# Patient Record
Sex: MALE | Employment: UNEMPLOYED | URBAN - METROPOLITAN AREA
[De-identification: names, ages, dates, MRNs, and addresses within clinical notes are randomized per-mention and may not be internally consistent; named-entity substitution may affect disease eponyms.]

---

## 2020-09-07 ENCOUNTER — TRANSFERRED RECORDS (OUTPATIENT)
Dept: HEALTH INFORMATION MANAGEMENT | Facility: CLINIC | Age: 25
End: 2020-09-07

## 2020-10-26 ENCOUNTER — TRANSFERRED RECORDS (OUTPATIENT)
Dept: HEALTH INFORMATION MANAGEMENT | Facility: CLINIC | Age: 25
End: 2020-10-26

## 2020-12-03 ENCOUNTER — TRANSFERRED RECORDS (OUTPATIENT)
Dept: HEALTH INFORMATION MANAGEMENT | Facility: CLINIC | Age: 25
End: 2020-12-03

## 2020-12-07 ENCOUNTER — TRANSFERRED RECORDS (OUTPATIENT)
Dept: HEALTH INFORMATION MANAGEMENT | Facility: CLINIC | Age: 25
End: 2020-12-07

## 2020-12-08 ENCOUNTER — TRANSFERRED RECORDS (OUTPATIENT)
Dept: HEALTH INFORMATION MANAGEMENT | Facility: CLINIC | Age: 25
End: 2020-12-08

## 2020-12-08 ENCOUNTER — TELEPHONE (OUTPATIENT)
Dept: ONCOLOGY | Facility: CLINIC | Age: 25
End: 2020-12-08

## 2020-12-08 ENCOUNTER — MEDICAL CORRESPONDENCE (OUTPATIENT)
Dept: HEALTH INFORMATION MANAGEMENT | Facility: CLINIC | Age: 25
End: 2020-12-08

## 2020-12-08 NOTE — TELEPHONE ENCOUNTER
[facility] Call Center    Phone Message    May a detailed message be left on voicemail: yes     Reason for Call: Other: Dr. Vj Corey called to request that Dr. Major call him back to discuss a complex surgery for the patient. Please call Dr. Corey to discuss.      Action Taken: Other:  Urology    Travel Screening: Not Applicable

## 2020-12-09 ENCOUNTER — TRANSCRIBE ORDERS (OUTPATIENT)
Dept: OTHER | Age: 25
End: 2020-12-09

## 2020-12-09 DIAGNOSIS — C77.2 MALIGNANT NEOPLASM OF TESTIS METASTATIC TO INTRA-ABDOMINAL LYMPH NODE (H): Primary | ICD-10-CM

## 2020-12-09 DIAGNOSIS — C79.00: ICD-10-CM

## 2020-12-09 DIAGNOSIS — C62.90 MALIGNANT NEOPLASM OF TESTIS METASTATIC TO INTRA-ABDOMINAL LYMPH NODE (H): Primary | ICD-10-CM

## 2020-12-11 ENCOUNTER — TELEPHONE (OUTPATIENT)
Dept: OTHER | Age: 25
End: 2020-12-11

## 2020-12-11 ENCOUNTER — DOCUMENTATION ONLY (OUTPATIENT)
Dept: UROLOGY | Facility: CLINIC | Age: 25
End: 2020-12-11

## 2020-12-11 NOTE — PROGRESS NOTES
MEDICAL RECORDS REQUEST   [facility]  Urology Clinic  [address] [place] [address]  PHONE: [phone]  Fax: [phone]        FUTURE VISIT INFORMATION                                                   Cyrus Huertas : 1995 scheduled for future visit at [facility]    APPOINTMENT INFORMATION:    Date:     Provider:  Frank Major MD    Reason for Visit/Diagnosis:Llarge retroperitoneal mass     REFERRAL INFORMATION:    Referring provider: N/A    Specialty: N/A    Referring providers clinic:  [facility]    Clinic contact number:  N/A    RECORDS REQUESTED FOR VISIT                                                     NOTES  STATUS/DETAILS   OFFICE NOTE from referring provider  yes   OFFICE NOTE from other specialist  no   DISCHARGE SUMMARY from hospital  yes   DISCHARGE REPORT from the ER  yes   OPERATIVE REPORT  yes   MEDICATION LIST  yes     PRE-VISIT CHECKLIST      Record collection complete Yes- [facility] recs scanned in epic  Images in  PACS   PET 20  CT 10/26/20    Appointment appropriately scheduled           (right time/right provider) Yes   MyChart activation If no, please explain: In process   Questionnaire complete If no, please explain: In process      Completed by: Halie Waldron

## 2020-12-11 NOTE — TELEPHONE ENCOUNTER
Oncology/Surgical Oncology Referral Request:     Specialty Requested: Medical Oncology     Referring Provider: Vj Corey MD     Referring Clinic/Organization: [facility] location: Faxed - Media tab/Scanned     Requested Provider (if specified): Frank Jeff MD      Called pt x 2. LVM and sending letter

## 2020-12-11 NOTE — TELEPHONE ENCOUNTER
Called pt x 2 to schedule appt from the referral in system. Per Kianna Lindsay:  He can be scheduled at 4:20 on 12/17/20.  Thank you  Pt did not answered. Sending letter.

## 2020-12-11 NOTE — LETTER
"    December 11, 2020       TO: Cyrus Pop Kiya  [address]  [facility] [address]         Dear Mr. Huertas,    Our records indicate that you have not scheduled an appointment for a consultation, as recommended by Dr. Vj Corey. If you wish to schedule within [facility]alth, we have several options to help you schedule your appointment:      Call [phone]    Visit our website at [email or web address] and click \"I Want To\" (in upper right) and select Request an Appointment    Request an appointment via [employer] at [email or web address]     If you have chosen to schedule elsewhere or if you have already made an appointment, please disregard this letter.    If you have any questions or concerns regarding the information above, please contact the [employer] DEPARTMENT at [phone].      Sincerely,      [facility] DEPARTMENT                  "

## 2020-12-15 ENCOUNTER — TELEPHONE (OUTPATIENT)
Dept: ONCOLOGY | Facility: CLINIC | Age: 25
End: 2020-12-15

## 2020-12-15 NOTE — TELEPHONE ENCOUNTER
Left message for pt to call and schedule a virtual visit consult with Dr. Major on 12/17/20 at 4:15PM to discuss Testis mass, okay to overbook per Kianna.

## 2020-12-15 NOTE — TELEPHONE ENCOUNTER
----- Message from Kianna Lindsay RN sent at 12/15/2020 11:55 AM CST -----  Can you please call this patient and schedule him to have a VV with Dr. Major on Thursday, 12/17, at the end of day to discuss Testis mass? Thank you  ----- Message -----  From: Frank Major MD  Sent: 12/14/2020   5:04 PM CST  To: Kianna Lindsay RN    This is him. I'd like to see him Thursday. Fine to add at the end of the day.    [place]    ----- Message -----  From: Kianna Lindsay RN  Sent: 12/14/2020   2:41 PM CST  To: Frank Major MD    I cannot find that Testis mass anywhere. Was it an email? Mcpike?

## 2020-12-17 ENCOUNTER — TELEPHONE (OUTPATIENT)
Dept: UROLOGY | Facility: CLINIC | Age: 25
End: 2020-12-17

## 2020-12-17 ENCOUNTER — VIRTUAL VISIT (OUTPATIENT)
Dept: UROLOGY | Facility: CLINIC | Age: 25
End: 2020-12-17
Payer: COMMERCIAL

## 2020-12-17 DIAGNOSIS — C62.90 MALIGNANT NEOPLASM OF TESTIS METASTATIC TO INTRA-ABDOMINAL LYMPH NODE (H): Primary | ICD-10-CM

## 2020-12-17 DIAGNOSIS — C77.2 MALIGNANT NEOPLASM OF TESTIS METASTATIC TO INTRA-ABDOMINAL LYMPH NODE (H): Primary | ICD-10-CM

## 2020-12-17 PROBLEM — C79.02: Status: ACTIVE | Noted: 2020-12-17

## 2020-12-17 PROBLEM — C80.1: Status: ACTIVE | Noted: 2020-12-17

## 2020-12-17 PROBLEM — I15.9 SECONDARY HYPERTENSION: Status: ACTIVE | Noted: 2020-12-17

## 2020-12-17 PROCEDURE — 99205 OFFICE O/P NEW HI 60 MIN: CPT | Mod: 95 | Performed by: UROLOGY

## 2020-12-17 ASSESSMENT — PAIN SCALES - GENERAL: PAINLEVEL: NO PAIN (0)

## 2020-12-17 NOTE — LETTER
"12/17/2020      RE: Cyrus Huertas  [address]  [facility] [address]       Video Visit Technology for this patient: [employer] Video Visit- Please send an invite to patient's Text to cell phone: [phone]   Cyrus Huertas is a 25 year old male who is being evaluated via a billable video visit.       The patient has been notified of following:      \"This video visit will be conducted via a call between you and your physician/provider. We have found that certain health care needs can be provided without the need for an in-person physical exam.  This service lets us provide the care you need with a video conversation.  If a prescription is necessary we can send it directly to your pharmacy.  If lab work is needed we can place an order for that and you can then stop by our lab to have the test done at a later time.     Video visits are billed at different rates depending on your insurance coverage.  Please reach out to your insurance provider with any questions.     If during the course of the call the physician/provider feels a video visit is not appropriate, you will not be charged for this service.\"     Patient has given verbal consent for Video visit? Yes  How would you like to obtain your AVS? Mail a copy  If you are dropped from the video visit, the video invite should be resent to: Text to cell phone: [phone]    Will anyone else be joining your video visit? No          Chief Complaint:   Retroperitoneal mass; Testis Cancer         Consult or Referral:     Mr. Cyrus Huertas is a 25 year old male evaluated at the request of Dr. Corey.         History of Present Illness:   Cyrus Huertas is a very pleasant 25 year old male who presents with a history of testis cancer. He initially presented in Sept 2020 with large left testis mass. Of note, also had COVID 19 at that time. He subsequently was found to have large left testis mass with large retroperitoneal nodes involving the left kidney, " consistent with metastasis. Tumor markers were elevated, most notably AFP which was 123,000 at diagnosis.    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 He subsequently underwent chemotherapy at [facility] with total of 5 cycles of Etoposide, Cisplatin, and Ifosfamide. Overall he tolerated chemotherapy very well. His tumor markers have declined, but remain elevated. On post-treatment PET scan, he was noted to have had interval improvement in side of retroperitoneal mass, but this remains rather large and clearly invades the left kidney. He is referred for discussion of RPLND.    AFP  12/3/2020 - 1,031  11/18/2020 - 2,471  10/28/2020 - 13,003  10/22/2020 - 21,635  10/7/2020 - 79,636  9/17/2020 - 123,828    Left Orchiectomy 9/7/2020  Final Diagnosis:  A.  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 Tumor invades the scrotum. Margins free of tumor. Pathologic   staging (AJCC 8th ed.): pT4. See Comment for complete tumor synopsis.    B.  Testis, left testicle medial margin with scrotal involvement, orchiectomy - Yolk sac tumor   involving the subcutaneous scrotal soft tissue. Margins free of tumor.         Past Medical History:   Testis cancer; metastatic  Secondary Hypertension         Past Surgical History:   Left orchiectomy         Medications     Current Outpatient Medications   Medication     NO ACTIVE MEDICATIONS     PERMETHRIN 1 % EX LOTN     No current facility-administered medications for this visit.           Family History:   No known family history of  malignancy.         Social History:     Social History     Socioeconomic History     Marital status: Single     Spouse name: Not on file     Number of children: Not on file     Years of education: Not on file     Highest education level: Not on file   Occupational History     Not on file   Social Needs     Financial resource strain:  Not on file     Food insecurity     Worry: Not on file     Inability: Not on file     Transportation needs     Medical: Not on file     Non-medical: Not on file   Tobacco Use     Smoking status: Never Smoker   Substance and Sexual Activity     Alcohol use: Not on file     Drug use: Not on file     Sexual activity: Not on file   Lifestyle     Physical activity     Days per week: Not on file     Minutes per session: Not on file     Stress: Not on file   Relationships     Social connections     Talks on phone: Not on file     Gets together: Not on file     Attends Worship service: Not on file     Active member of club or organization: Not on file     Attends meetings of clubs or organizations: Not on file     Relationship status: Not on file     Intimate partner violence     Fear of current or ex partner: Not on file     Emotionally abused: Not on file     Physically abused: Not on file     Forced sexual activity: Not on file   Other Topics Concern     Not on file   Social History Narrative     Not on file           Allergies:   Penicillins, Lactose, and Uncaria tomentosa (cats claw)         Review of Systems:  From intake questionnaire     Skin: negative  Eyes: negative  Ears/Nose/Throat: negative  Respiratory: No shortness of breath, dyspnea on exertion, cough, or hemoptysis  Cardiovascular: No chest pain or palpitations  Gastrointestinal: negative; no nausea/vomiting, constipation or diarrhea  Genitourinary: as per HPI  Musculoskeletal: negative  Neurologic: negative  Psychiatric: negative  Hematologic/Lymphatic/Immunologic: negative  Endocrine: negative         Physical Exam:     Patient is a 25 year old  male   Vitals: There were no vitals taken for this visit.  Constitutional: There is no height or weight on file to calculate BMI.  Alert, no acute distress, oriented, conversant  Eyes: no scleral icterus; extraocular muscles intact  Respiratory: no respiratory distress, or pursed lip breathing  Musculoskeletal:  normal range of motion  Skin: no notable lesions visible  Neuro: Alert, oriented, speech and mentation normal  Psych: affect and mood normal, alert and oriented to person, place and time      Labs and Pathology:    I reviewed all applicable laboratory and pathology data and went over findings with patient  Pathology from testis mass reviewed in detail.       Imaging:    I directly visualized and reviewed all applicable imaging a with patient.    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 Significant decrease in size of the dominant left retroperitoneal soft tissue metastatic lesion, with mild residual peripheral nodular uptake likely representing a degree of residual viable tumor. Lobular extension of the mass medially anterior to the mesenteric root with similar imaging characteristics. Previously seen peritoneal nodularity is significantly improved.  2. Postsurgical changes of left orchiectomy.  3. Intermediate FDG uptake within a right inguinal lymph node is nonspecific, could be reactive, metastasis difficult to entirely exclude but considered less likely.  4. Improvement in mass effect on adjacent structures, including decreased mass effect on the left kidney, which continues to demonstrate moderate hydronephrosis and renal cortical thinning compatible with impaired function.  5. Diffuse bladder wall thickening probably indicates a degree of chronic outlet obstruction.  6. Massive rectal stool burden.      Outside and Past Medical records:    I spent 10 minutes reviewing outside and past medical records.         Assessment and Plan:     Assessment: 25 year old male with stage [id number], stage IIIB testis cancer. Tolerated 5 cycles of VIP with response of his tumor on PET scan. He has persistent elevation of is AFP (although down-trending), and a large residual retroperitoneal mass about 13 cm in size (down from 22cm  on initial imaging). There remains some uptake on PET within the mass. The mass also involves the left kidney.    We had a long discussion about these findings and plans going forward. Given the residual mass after chemotherapy, we discussed the role for RPLND and resection of this mass. Given the appearance on imaging, left nephrectomy will also be required. We discussed the potential risks of this procedure, including risk of bleeding, infection, injury to surrounding structures, chylous ascites, retrograde ejaculation, and wound complications. More specifically, we discussed the potential for considerable scar tissue around the mass, which may complicate the dissection of surrounding organs including pancreas, colon, and spleen. In addition, there is a risk of vascular involvement and scar tissue in the eduard-aortic tissues. As such, will plan to have vascular surgery on stand-by for procedure, given there is a risk that aortic repair or grafting may be required to completely resect the mass. This was all discussed in great detail with Mr. Huertas and he elects to proceed. He expresses understanding of expected post-op recovery and risk for complications.    Plan:  - Schedule open retroperitoneal lymphadenectomy with resection of retroperitoneal mass and left radical nephrectomy  - PAC visit  - Plan for repeat tumor markers and CT chest/abd/pelvis prior to surgery    Orders  Orders Placed This Encounter   Procedures     CT Chest/Abdomen/Pelvis w Contrast     AFP tumor marker     HCG tumor marker     Lactate Dehydrogenase (LDH)     Comprehensive metabolic panel     CBC with platelets differential     PAC Visit Referral (For [facility] Only)     Video-Visit Details    Type of service:  Video Visit    Video Start Time: 3:48 PM  Video End Time: 4:05 PM    Originating Location (pt. Location): Home    Distant Location (provider location):  [facility] UROLOGY AND [facility] FOR PROSTATE AND UROLOGIC CANCERS    Platform used for Video  Visit: Torito Major MD  Urology   [facility]

## 2020-12-17 NOTE — PROGRESS NOTES
"Video Visit Technology for this patient: [employer] Video Visit- Please send an invite to patient's Text to cell phone: [phone]   Cyrus Huertas is a 25 year old male who is being evaluated via a billable video visit.       The patient has been notified of following:      \"This video visit will be conducted via a call between you and your physician/provider. We have found that certain health care needs can be provided without the need for an in-person physical exam.  This service lets us provide the care you need with a video conversation.  If a prescription is necessary we can send it directly to your pharmacy.  If lab work is needed we can place an order for that and you can then stop by our lab to have the test done at a later time.     Video visits are billed at different rates depending on your insurance coverage.  Please reach out to your insurance provider with any questions.     If during the course of the call the physician/provider feels a video visit is not appropriate, you will not be charged for this service.\"     Patient has given verbal consent for Video visit? Yes  How would you like to obtain your AVS? Mail a copy  If you are dropped from the video visit, the video invite should be resent to: Text to cell phone: [phone]    Will anyone else be joining your video visit? No          Chief Complaint:   Retroperitoneal mass; Testis Cancer         Consult or Referral:     Mr. Cyrus Huertas is a 25 year old male evaluated at the request of Dr. Corey.         History of Present Illness:   Cyrus Huertas is a very pleasant 25 year old male who presents with a history of testis cancer. He initially presented in Sept 2020 with large left testis mass. Of note, also had COVID 19 at that time. He subsequently was found to have large left testis mass with large retroperitoneal nodes involving the left kidney, consistent with metastasis. Tumor markers were elevated, most notably AFP which was " 123,000 at diagnosis.    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 He subsequently underwent chemotherapy at [facility] with total of 5 cycles of Etoposide, Cisplatin, and Ifosfamide. Overall he tolerated chemotherapy very well. His tumor markers have declined, but remain elevated. On post-treatment PET scan, he was noted to have had interval improvement in side of retroperitoneal mass, but this remains rather large and clearly invades the left kidney. He is referred for discussion of RPLND.    AFP  12/3/2020 - 1,031  11/18/2020 - 2,471  10/28/2020 - 13,003  10/22/2020 - 21,635  10/7/2020 - 79,636  9/17/2020 - 123,828    Left Orchiectomy 9/7/2020  Final Diagnosis:  A.  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 Tumor invades the scrotum. Margins free of tumor. Pathologic   staging (AJCC 8th ed.): pT4. See Comment for complete tumor synopsis.    B.  Testis, left testicle medial margin with scrotal involvement, orchiectomy - Yolk sac tumor   involving the subcutaneous scrotal soft tissue. Margins free of tumor.         Past Medical History:   Testis cancer; metastatic  Secondary Hypertension         Past Surgical History:   Left orchiectomy         Medications     Current Outpatient Medications   Medication     NO ACTIVE MEDICATIONS     PERMETHRIN 1 % EX LOTN     No current facility-administered medications for this visit.           Family History:   No known family history of  malignancy.         Social History:     Social History     Socioeconomic History     Marital status: Single     Spouse name: Not on file     Number of children: Not on file     Years of education: Not on file     Highest education level: Not on file   Occupational History     Not on file   Social Needs     Financial resource strain: Not on file     Food insecurity     Worry: Not on file     Inability: Not on file      Transportation needs     Medical: Not on file     Non-medical: Not on file   Tobacco Use     Smoking status: Never Smoker   Substance and Sexual Activity     Alcohol use: Not on file     Drug use: Not on file     Sexual activity: Not on file   Lifestyle     Physical activity     Days per week: Not on file     Minutes per session: Not on file     Stress: Not on file   Relationships     Social connections     Talks on phone: Not on file     Gets together: Not on file     Attends Protestant service: Not on file     Active member of club or organization: Not on file     Attends meetings of clubs or organizations: Not on file     Relationship status: Not on file     Intimate partner violence     Fear of current or ex partner: Not on file     Emotionally abused: Not on file     Physically abused: Not on file     Forced sexual activity: Not on file   Other Topics Concern     Not on file   Social History Narrative     Not on file           Allergies:   Penicillins, Lactose, and Uncaria tomentosa (cats claw)         Review of Systems:  From intake questionnaire     Skin: negative  Eyes: negative  Ears/Nose/Throat: negative  Respiratory: No shortness of breath, dyspnea on exertion, cough, or hemoptysis  Cardiovascular: No chest pain or palpitations  Gastrointestinal: negative; no nausea/vomiting, constipation or diarrhea  Genitourinary: as per HPI  Musculoskeletal: negative  Neurologic: negative  Psychiatric: negative  Hematologic/Lymphatic/Immunologic: negative  Endocrine: negative         Physical Exam:     Patient is a 25 year old  male   Vitals: There were no vitals taken for this visit.  Constitutional: There is no height or weight on file to calculate BMI.  Alert, no acute distress, oriented, conversant  Eyes: no scleral icterus; extraocular muscles intact  Respiratory: no respiratory distress, or pursed lip breathing  Musculoskeletal: normal range of motion  Skin: no notable lesions visible  Neuro: Alert, oriented,  speech and mentation normal  Psych: affect and mood normal, alert and oriented to person, place and time      Labs and Pathology:    I reviewed all applicable laboratory and pathology data and went over findings with patient  Pathology from testis mass reviewed in detail.       Imaging:    I directly visualized and reviewed all applicable imaging a with patient.    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 Significant decrease in size of the dominant left retroperitoneal soft tissue metastatic lesion, with mild residual peripheral nodular uptake likely representing a degree of residual viable tumor. Lobular extension of the mass medially anterior to the mesenteric root with similar imaging characteristics. Previously seen peritoneal nodularity is significantly improved.  2. Postsurgical changes of left orchiectomy.  3. Intermediate FDG uptake within a right inguinal lymph node is nonspecific, could be reactive, metastasis difficult to entirely exclude but considered less likely.  4. Improvement in mass effect on adjacent structures, including decreased mass effect on the left kidney, which continues to demonstrate moderate hydronephrosis and renal cortical thinning compatible with impaired function.  5. Diffuse bladder wall thickening probably indicates a degree of chronic outlet obstruction.  6. Massive rectal stool burden.      Outside and Past Medical records:    I spent 10 minutes reviewing outside and past medical records.         Assessment and Plan:     Assessment: 25 year old male with stage [id number], stage IIIB testis cancer. Tolerated 5 cycles of VIP with response of his tumor on PET scan. He has persistent elevation of is AFP (although down-trending), and a large residual retroperitoneal mass about 13 cm in size (down from 22cm on initial imaging). There remains some uptake on PET within the mass. The mass  also involves the left kidney.    We had a long discussion about these findings and plans going forward. Given the residual mass after chemotherapy, we discussed the role for RPLND and resection of this mass. Given the appearance on imaging, left nephrectomy will also be required. We discussed the potential risks of this procedure, including risk of bleeding, infection, injury to surrounding structures, chylous ascites, retrograde ejaculation, and wound complications. More specifically, we discussed the potential for considerable scar tissue around the mass, which may complicate the dissection of surrounding organs including pancreas, colon, and spleen. In addition, there is a risk of vascular involvement and scar tissue in the eduard-aortic tissues. As such, will plan to have vascular surgery on stand-by for procedure, given there is a risk that aortic repair or grafting may be required to completely resect the mass. This was all discussed in great detail with Mr. Huertas and he elects to proceed. He expresses understanding of expected post-op recovery and risk for complications.    Plan:  - Schedule open retroperitoneal lymphadenectomy with resection of retroperitoneal mass and left radical nephrectomy  - PAC visit  - Plan for repeat tumor markers and CT chest/abd/pelvis prior to surgery    Orders  Orders Placed This Encounter   Procedures     CT Chest/Abdomen/Pelvis w Contrast     AFP tumor marker     HCG tumor marker     Lactate Dehydrogenase (LDH)     Comprehensive metabolic panel     CBC with platelets differential     PAC Visit Referral (For [facility] Only)     Video-Visit Details    Type of service:  Video Visit    Video Start Time: 3:48 PM  Video End Time: 4:05 PM    Originating Location (pt. Location): Home    Distant Location (provider location):  [facility] UROLOGY AND [facility] FOR PROSTATE AND UROLOGIC CANCERS    Platform used for Video Visit: Torito Major MD  Urology   [facility]  Physicians

## 2020-12-21 RX ORDER — CLINDAMYCIN PHOSPHATE 900 MG/50ML
900 INJECTION, SOLUTION INTRAVENOUS
Status: CANCELLED | OUTPATIENT
Start: 2020-12-21

## 2020-12-21 RX ORDER — HEPARIN SODIUM 5000 [USP'U]/.5ML
5000 INJECTION, SOLUTION INTRAVENOUS; SUBCUTANEOUS
Status: CANCELLED | OUTPATIENT
Start: 2020-12-21

## 2020-12-21 RX ORDER — CLINDAMYCIN PHOSPHATE 900 MG/50ML
900 INJECTION, SOLUTION INTRAVENOUS SEE ADMIN INSTRUCTIONS
Status: CANCELLED | OUTPATIENT
Start: 2020-12-21

## 2020-12-22 ENCOUNTER — TELEPHONE (OUTPATIENT)
Dept: UROLOGY | Facility: CLINIC | Age: 25
End: 2020-12-22

## 2020-12-22 NOTE — TELEPHONE ENCOUNTER
Patient is scheduled for surgery with Dr. Major      Spoke or left message with: spoke with Cyrus    Date of Surgery: 1/18/2021    Location: [place]    Informed patient they will need an adult  yes    Pre-op with surgeon (if applicable): n/a    H&P: Scheduled with PAC on 1/5/2021    Additional imaging/appointments: CT on 1/5/2021    Surgery packet: Mailed on 12/22/2020     Additional comments: COVID test scheduled at [facility] on 1/14/2021 @ 10:45a

## 2020-12-23 NOTE — TELEPHONE ENCOUNTER
FUTURE VISIT INFORMATION      SURGERY INFORMATION:    Date: 1/18/21    Location: [facility] OR    Surgeon:  Frank Major MD    Anesthesia Type:  General    Procedure: LYMPHADENECTOMY, RETROPERITONEUM; RESECTION OF RETROPERITONEAL MASS LEFT RADICAL NEPHRECTOMY    Consult: virtual visit 12/17/20    RECORDS REQUESTED FROM:       Primary Care Provider: Morales Chavira MD    Pertinent Medical History: secondary hypertension    Most recent EKG+ Tracing: 10/31/20- [facility]

## 2021-01-01 DIAGNOSIS — Z11.59 ENCOUNTER FOR SCREENING FOR OTHER VIRAL DISEASES: Primary | ICD-10-CM

## 2021-01-05 ENCOUNTER — PRE VISIT (OUTPATIENT)
Dept: SURGERY | Facility: CLINIC | Age: 26
End: 2021-01-05

## 2021-01-05 NOTE — TELEPHONE ENCOUNTER
FUTURE VISIT INFORMATION        SURGERY INFORMATION:    Date: 1/18/21    Location: [facility] OR    Surgeon:  Frank Major MD    Anesthesia Type:  General    Procedure: LYMPHADENECTOMY, RETROPERITONEUM; RESECTION OF RETROPERITONEAL MASS LEFT RADICAL NEPHRECTOMY    Consult: virtual visit 12/17/20     RECORDS REQUESTED FROM:         Primary Care Provider: Morales Chavira MD     Pertinent Medical History: secondary hypertension     Most recent EKG+ Tracing: 10/31/20- [facility]

## 2021-01-06 ENCOUNTER — OFFICE VISIT (OUTPATIENT)
Dept: SURGERY | Facility: CLINIC | Age: 26
End: 2021-01-06
Payer: COMMERCIAL

## 2021-01-06 ENCOUNTER — PRE VISIT (OUTPATIENT)
Dept: SURGERY | Facility: CLINIC | Age: 26
End: 2021-01-06

## 2021-01-06 ENCOUNTER — ANESTHESIA EVENT (OUTPATIENT)
Dept: SURGERY | Facility: CLINIC | Age: 26
End: 2021-01-06
Payer: COMMERCIAL

## 2021-01-06 VITALS
DIASTOLIC BLOOD PRESSURE: 87 MMHG | BODY MASS INDEX: 23.23 KG/M2 | RESPIRATION RATE: 16 BRPM | HEART RATE: 115 BPM | SYSTOLIC BLOOD PRESSURE: 129 MMHG | WEIGHT: 148 LBS | OXYGEN SATURATION: 100 % | HEIGHT: 67 IN

## 2021-01-06 DIAGNOSIS — Z01.818 PREOP EXAMINATION: Primary | ICD-10-CM

## 2021-01-06 PROCEDURE — 99203 OFFICE O/P NEW LOW 30 MIN: CPT | Performed by: PHYSICIAN ASSISTANT

## 2021-01-06 SDOH — HEALTH STABILITY: MENTAL HEALTH: HOW MANY STANDARD DRINKS CONTAINING ALCOHOL DO YOU HAVE ON A TYPICAL DAY?: NOT ASKED

## 2021-01-06 SDOH — HEALTH STABILITY: MENTAL HEALTH: HOW OFTEN DO YOU HAVE A DRINK CONTAINING ALCOHOL?: NOT ASKED

## 2021-01-06 SDOH — HEALTH STABILITY: MENTAL HEALTH: HOW OFTEN DO YOU HAVE 6 OR MORE DRINKS ON ONE OCCASION?: NOT ASKED

## 2021-01-06 ASSESSMENT — MIFFLIN-ST. JEOR: SCORE: 1604.95

## 2021-01-06 ASSESSMENT — LIFESTYLE VARIABLES: TOBACCO_USE: 0

## 2021-01-06 ASSESSMENT — ENCOUNTER SYMPTOMS: SEIZURES: 0

## 2021-01-06 ASSESSMENT — PAIN SCALES - GENERAL: PAINLEVEL: NO PAIN (0)

## 2021-01-06 NOTE — H&P
Pre-Operative H & P     CC:  Preoperative exam to assess for increased cardiopulmonary risk while undergoing surgery and anesthesia.    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     Mr. Huertas has a history of testis cancer. He initially presented in Sept 2020 with large left testis mass. Of note, also had COVID 19 at that time. He subsequently was found to have large left testis mass with large retroperitoneal nodes involving the left kidney, consistent with metastasis. Tumor markers were elevated, most notably AFP which was 123,000 at diagnosis. 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 He subsequently underwent chemotherapy at [facility] with total of 5 cycles of Etoposide, Cisplatin, and Ifosfamide. Overall he tolerated chemotherapy very well. His tumor markers have declined, but remain elevated. On post-treatment PET scan, he was noted to have had interval improvement in side of retroperitoneal mass, but this remains rather large and clearly invades the left kidney. He now presents for the above procedure.     PMH is also significant for anemia and malnutrition during chemotherapy, improving. He has received several blood transfusions, most recently on 12/24/20.     History was obtained from patient & chart review.    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 Denies reactions.     Hx of abnormal bleeding or anti-platelet use: denies    Menstrual history: No LMP for male patient.:      Steroid use in the last year: Received dexamethasone w/ chemo    Personal or FH with difficulty with Anesthesia:  denies    Prior to Admission Medications  Current Outpatient Medications   Medication Sig Dispense Refill     NO ACTIVE MEDICATIONS .         Allergies  Allergies   Allergen Reactions     Penicillins Anaphylaxis and Rash     rash     Lactose Unknown     Uncaria Tomentosa (Cats Claw) Rash       Social History  Social History     Socioeconomic History     Marital status: Single     Spouse name: Not on file     Number of children: Not on file     Years of education: Not on file     Highest education level: Not on file   Occupational History     Not on file   Social Needs     Financial resource strain: Not on file     Food insecurity     Worry: Not on file     Inability: Not on file     Transportation needs     Medical: Not on file     Non-medical: Not on file   Tobacco Use     Smoking status: Never Smoker     Smokeless tobacco: Never Used   Substance and Sexual Activity     Alcohol use: Not Currently     Drug use: Not on file     Sexual activity: Not on file   Lifestyle     Physical activity     Days per week: Not on file     Minutes per session: Not on file     Stress: Not on file   Relationships     Social connections     Talks on phone: Not on file     Gets together: Not on file     Attends Tenriism service: Not on file     Active member of club or organization: Not on file     Attends meetings of clubs or organizations: Not on file     Relationship status: Not on file     Intimate partner violence     Fear  "of current or ex partner: Not on file     Emotionally abused: Not on file     Physically abused: Not on file     Forced sexual activity: Not on file   Other Topics Concern     Not on file   Social History Narrative     Not on file       Family History  Family History   Problem Relation Age of Onset     Anesthesia Reaction No family hx of      Cardiovascular No family hx of      Deep Vein Thrombosis (DVT) No family hx of        Preop Vitals    BP Readings from Last 3 Encounters:   01/06/21 129/87    Pulse Readings from Last 3 Encounters:   01/06/21 115   03/19/09 100   01/03/08 80      Resp Readings from Last 3 Encounters:   01/06/21 16   10/30/05 20    SpO2 Readings from Last 3 Encounters:   01/06/21 100%      Temp Readings from Last 1 Encounters:   03/19/09 97  F (36.1  C)    Ht Readings from Last 1 Encounters:   01/06/21 1.702 m (5' 7\")      Wt Readings from Last 1 Encounters:   01/06/21 67.1 kg (148 lb)    Estimated body mass index is 23.18 kg/m  as calculated from the following:    Height as of this encounter: 1.702 m (5' 7\").    Weight as of this encounter: 67.1 kg (148 lb).       ROS/MED HX  The complete review of systems is negative other than noted in the HPI or here.  Patient denies recent illness, fever and respiratory infection during past month.    ENT/Pulmonary: Comment: Tested positive for Covid 9/2020, asymptomatic     (-) tobacco use and asthma   Neurologic:  - neg neurologic ROS    (-) seizures, CVA and Neuropathy   Cardiovascular:  - neg cardiovascular ROS   (+) ----. : . . . :. . Previous cardiac testing date:results:date: results:ECG reviewed date:12/31/20 results: date: results:          METS/Exercise Tolerance:  4 - Raking leaves, gardening   Hematologic: Comments: Transfused several times, most recently on 12/24/20    (+) Anemia, History of Transfusion no previous transfusion reaction -     (-) history of blood clots   Musculoskeletal:  - neg musculoskeletal ROS       GI/Hepatic:  - neg " "GI/hepatic ROS      (-) GERD and liver disease   Renal/Genitourinary:  - ROS Renal section negative    (-) renal disease   Endo: Comment: Had steroids w/ chemo     (-) Type II DM   Psychiatric:  - neg psychiatric ROS       Infectious Disease:  - neg infectious disease ROS       Malignancy:   (+) Malignancy History of Other  Other CA Testis w/ mets to abdomen Active status post Surgery and Chemo         Other:    (+) no H/O Chronic Pain,               PHYSICAL EXAM:   Mental Status/Neuro: A/A/O; Age Appropriate   Airway: Facies: Feasible  Mallampati: I  Mouth/Opening: Full  TM distance: > 6 cm  Neck ROM: Full   Respiratory: Auscultation: CTAB     Resp. Rate: Normal     Resp. Effort: Normal      CV: Rhythm: Regular  Rate: Age appropriate  Heart: Normal Sounds  Edema: None   Comments:      Dental: Normal Dentition                  BP: 129/87 Pulse: 115   Resp: 16 SpO2: 100 %         148 lbs 0 oz  5' 7\"   Body mass index is 23.18 kg/m .       Physical Exam  Constitutional: Awake, alert, cooperative, no apparent distress, and appears stated age.  Eyes: Pupils equal, round and reactive to light, extra ocular muscles intact, sclera clear, conjunctiva normal.  HENT: Normocephalic, oral pharynx with moist mucus membranes, good dentition. No goiter appreciated. No removable dental hardware. Enlarged tonsils w/o erythema/edema/exudate.  Respiratory: Clear to auscultation bilaterally, no crackles or wheezing. No SOB when supine.  Cardiovascular: Regular rate and rhythm, normal S1 and S2, and no murmur noted.  Carotids +2, no bruits. No edema. Palpable pulses to radial, DP and PT arteries.   GI: Normal bowel sounds, soft, non-distended, non-tender, no masses palpated.    Lymph/Hematologic: No cervical lymphadenopathy and no supraclavicular lymphadenopathy.  Genitourinary:  deferred  Skin: Warm and dry.  No rashes.   Musculoskeletal: full ROM of neck. There is no redness, warmth, or swelling of the joints. Gross motor strength is " normal.    Neurologic: Awake, alert, oriented to name, place and time. Cranial nerves II-XII are grossly intact. Gait is normal. Ambulates from chair to exam table, seats self, lies supine and sits back up w/o assistance.  Neuropsychiatric: Calm, cooperative. Normal affect. Pleasant. Answers questions appropriately, follows commands w/o difficulty.        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 Significant decrease in size of the dominant left retroperitoneal soft tissue metastatic lesion, with mild residual peripheral nodular uptake likely representing a degree of residual viable tumor. Lobular extension of the mass medially anterior to the mesenteric root with similar imaging characteristics. Previously seen peritoneal nodularity is significantly improved.  2. Postsurgical changes of left orchiectomy.  3. Intermediate FDG uptake within a right inguinal lymph node is nonspecific, could be reactive, metastasis difficult to entirely exclude but considered less likely.  4. Improvement in mass effect on adjacent structures, including decreased mass effect on the left kidney, which continues to demonstrate moderate hydronephrosis and renal cortical thinning compatible with impaired function.  5. Diffuse bladder wall thickening probably indicates a degree of chronic outlet obstruction.  6. Massive rectal stool burden.      CT CHEST/ABD/PELVIS 10/26/20  Impression: In this patient with a history of metastatic testicular cancer s/p left orchiectomy and adjuvant chemotherapy there is overall mixed response:     1. Slightly decreased size of large left retroperitoneal mass as described above.  2. More pronounced exophytic component/satellite lesion that is difficult to ascertain whether this is due to decreased mass effect from larger component or  "interval growth.  3. Continued obstructive mass effect on the left kidney with encasement of the left renal vein and artery.  4. Mixed attenuating subcentimeter nodule in the left lower lobe is of indeterminate etiology. Recommend close attention on follow-up.  5. Severe constipation.    EKG 12/31/20  SINUS RHYTHM   SIGNIFICANT BASELINE ARTIFACT  BORDERLINE REPOLARIZATION CHANGES  BORDERLINE ECG      Labs today: None (T&S, CBC, CMP, lactate dehydr, RBC have available orders signed/held by Dr. 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      Pt has had prior anesthetic.     No history of anesthetic complications    He has the following specific operative considerations:   # JANIA 1/8 = low risk  # VTE risk: 1.8%  # Risk of PONV score = 2.  If > 2, anti-emetic intervention recommended.  # Anesthesia considerations:  Refer to PAC assessment in anesthesia records      CARDIAC: METS 4, Pt reports he is able to walk \"as far as I want.\" Ascends stairs w/o difficulty.       # RCRI : High risk surgery.  0.9% risk of major adverse cardiac event.      PULMONARY:     # Never smoked    # No asthma or inhaler use    # Tested + for covid 9/2020 during chemo treatment, asymptomatic    GI: denies GERD      /RENAL:     # Creatinine 1.35, GFR 83 today    # Testicular cancer w/ mets to to intra-abdominal lymph node    # Was malnourished during chemo, significantly improved    ENDO: BMI 22    # No DM    HEME:     # Anemia, has had 5 transfusions since Sept through [facility] [place], most recently 12/24/20.  Monitored " weekly. Hgb today 8.9 (up from 7.3 in December).    # RBCs ordered for DOS    ORTHO: full ROM of neck, no TMJ      Patient is optimized and is acceptable candidate for the proposed procedure. No further diagnostic evaluation is needed.    Final plan per anesthesiologist on day of surgery.     Arrival time, NPO, shower and medication instructions provided by nursing staff today.  Preparing For Your Surgery handout given.      Debbi Guajardo PA-C  Preoperative Assessment Center  [facility]  Clinic and Surgery Center  Phone: [phone]  Fax: [phone]

## 2021-01-06 NOTE — ANESTHESIA PREPROCEDURE EVALUATION
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) [C62.90, C77.2]   Procedure(s):  LYMPHADENECTOMY, RETROPERITONEUM; RESECTION OF RETROPERITONEAL MASS  LEFT RADICAL NEPHRECTOMY     LABS:  CBC: No results found for: WBC, HGB, HCT, PLT  BMP:   Lab Results   Component Value Date     2021    POTASSIUM 3.9 2021    CHLORIDE 109 2021    CO2 27 2021    BUN 17 2021    CR 1.42 (H) 2021     (H) 2021     COAGS: No results found for: PTT, INR, FIBR  POC: No results found for: BGM, HCG, HCGS  OTHER:   Lab Results   Component Value Date    GAYE 9.3 2021    ALBUMIN 3.9 2021    PROTTOTAL 7.7 2021    ALT 35 2021    AST 29 2021    ALKPHOS 105 2021    BILITOTAL 0.2 2021        Preop Vitals    BP Readings from Last 3 Encounters:   21 129/87    Pulse Readings from Last 3 Encounters:   21 115   09 100   08 80      Resp Readings from Last 3 Encounters:   21 16   10/30/05 20    SpO2 Readings from Last 3 Encounters:   21 100%      Temp Readings from Last 1 Encounters:   09 36.1  C (97  F)    Ht Readings from Last 1 Encounters:   21 1.702 m (5' 7\")      Wt Readings from Last 1 Encounters:   21 67.1 kg (148 lb)    Estimated body mass index is 23.18 kg/m  as calculated from the following:    Height as of 21: 1.702 m (5' 7\").    Weight as of 21: 67.1 kg (148 lb).     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 Pt has had prior anesthetic. Type: General.    No history of anesthetic complications          ROS/MED HX    ENT/Pulmonary: Comment: Tested positive for Covid 9/2020, asymptomatic   (-) tobacco use, asthma and COPD   Neurologic:  - neg neurologic ROS  (-) no seizures and no CVA   Cardiovascular:  - neg cardiovascular ROS   (+) -----Previous cardiac testing   Echo: Date: Results:    Stress Test: Date: Results:    ECG Reviewed: Date: 12/31/20 Results:    Cath: Date: Results:     (-) hypertension and dyslipidemia   METS/Exercise Tolerance:  4 - Raking leaves, gardening   Hematologic: Comments: Transfused several times, most recently on 12/24/20    (+) no previous transfusion reaction,    (-) history of blood clots   Musculoskeletal:  - neg musculoskeletal ROS       GI/Hepatic:  - neg GI/hepatic ROS    (-) GERD and liver disease   Renal/Genitourinary:  - neg Renal ROS    (-) renal disease   Endo: Comment: Had steroids w/ chemo     (-) Type II DM   Psychiatric:  - neg psychiatric ROS    (-) chronic opioid use history   Infectious Disease: Comment: COVID NEGATIVE 1/14/21 - neg infectious disease ROS       Malignancy: (+) Malignancy, History of Other.Other CA Testis w/ mets to abdomen Active status post Surgery and Chemo.      Other:    (+) , no H/O Chronic Pain,                       PHYSICAL EXAM:   Mental Status/Neuro: A/A/O; Age Appropriate   Airway: Facies: Feasible  Mallampati: I  Mouth/Opening: Full  TM distance: > 6 cm  Neck ROM: Full   Respiratory: Auscultation: CTAB     Resp. Rate: Normal     Resp. Effort: Normal      CV: Rhythm: Regular  Rate: Age appropriate  Heart: Normal Sounds  Edema: None   Comments:      Dental: Normal Dentition                Assessment:   ASA SCORE: 3    H&P: History and physical reviewed and following examination; no interval change.   Smoking Status:   Non-Smoker/Unknown   NPO Status: NPO Appropriate     Plan:   Anes. Type:  General   Pre-Medication: None   Induction:  IV (Standard)   Airway: ETT; Oral   Access/Monitoring: PIV; 2nd PIV; A-Line; FloTrac   Maintenance: Balanced     Advanced Monitoring: BIS     Postop Plan:   Postop Pain: Opioids; Regional  Postop Sedation/Airway: Not planned  Disposition: Inpatient/Admit     PONV Management:   Adult Risk Factors:, Non-Smoker, Postop Opioids   Prevention: Ondansetron, Dexamethasone     CONSENT: Direct conversation   Plan and risks discussed with: Patient   Blood Products: Consented (ALL Blood Products)       Comments for Plan/Consent:  25yoM w/ PMH of testicular cancer s/p orchiectomy presenting with retroperitoneal metastasis for radical nephrectomy, lymphadenectomy and resection of retroperitoneal mass. Plan for GETA with arterial line.              PAC Discussion and Assessment    ASA Classification: 3  Case is suitable for: [place]  Anesthetic techniques and relevant risks discussed: GA  Invasive monitoring and risk discussed: No  Types:   Possibility and Risk of blood transfusion discussed: No  NPO instructions given:   Additional anesthetic preparation and risks discussed:   Needs early admission to pre-op area:   Other:     PAC Resident/NP Anesthesia Assessment:  Cyrus Huertas is a 25 year old male scheduled to undergo LYMPHADENECTOMY, RETROPERITONEUM; RESECTION OF RETROPERITONEAL MASS; LEFT RADICAL NEPHRECTOMY with Frank Major MD on 1/18/21 at [facility] for treatment of 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      Pt has had prior anesthetic.     No history of anesthetic complications    He has the following specific operative considerations:   # JANIA 1/8 = low risk  # VTE risk: 1.8%  # Risk of PONV score = 2.  If > 2, anti-emetic intervention recommended.  # Anesthesia considerations:  Refer to PAC assessment in anesthesia  "records      CARDIAC: METS 4, Pt reports he is able to walk \"as far as I want.\" Ascends stairs w/o difficulty.       # RCRI : High risk surgery.  0.9% risk of major adverse cardiac event.      PULMONARY:     # Never smoked    # No asthma or inhaler use    # Tested + for covid 9/2020 during chemo treatment, asymptomatic    GI: denies GERD      /RENAL:     # Creatinine 1.35, GFR 83 today    # Testicular cancer w/ mets to to intra-abdominal lymph node    # Was malnourished during chemo, significantly improved    ENDO: BMI 22    # No DM    HEME:     # Anemia, has had 5 transfusions since Sept [place] [facility], most recently 12/24/20.  Monitored weekly. Hgb today 8.9 (up from 7.3 in December).    # RBCs ordered for DOS    ORTHO: full ROM of neck, no TMJ      Patient is optimized and is acceptable candidate for the proposed procedure. No further diagnostic evaluation is needed.    Final plan per anesthesiologist on day of surgery.       Reviewed and Signed by PAC Mid-Level Provider/Resident  Mid-Level Provider/Resident: Debbi Guajardo PA-C  Date: 1/6/21  Time: 1523    Attending Anesthesiologist Anesthesia Assessment:        Anesthesiologist:   Date:   Time:   Pass/Fail:   Disposition:     PAC Pharmacist Assessment:        Pharmacist:   Date:   Time:    Edilberto Niño MD  "

## 2021-01-06 NOTE — PATIENT INSTRUCTIONS
Preparing for Your Surgery      Name:  Cyrus Huertas   MRN:  [id number]   :  1995   Today's Date:  2021       Arriving for surgery:  Surgery date:  21  Arrival time:  5:30 am    Restrictions due to COVID 19:  No visitors are allowed at this time.    "[employer] parking is available for anyone with mobility limitations or disabilities.  ([facility]  24 hours/ 7 days a week; [facility]  7 am- 3:30 pm, Mon- Fri)    Please come to:       [facility] [place] [address]  [address] [id number]     -    Please proceed to the Surgery Lounge on the 3rd floor. [phone]?     - ?If you are in need of directions, wheelchair or escort please stop at the Information Desk in the lobby.  Inform the information person that you are here for surgery; a wheelchair and escort will be provided to the Surgery Lounge .?     What can I eat or drink?  -  You may eat and drink normally for up to 8 hours before your surgery. (Until 11:30 pm)  -  You may have clear liquids until 2 hours before surgery. (Until 5:30 am)    Examples of clear liquids:  Water  Clear broth  Juices (apple, white grape, white cranberry  and cider) without pulp  Noncarbonated, powder based beverages  (lemonade and Pro-Aid)  Sodas (Sprite, 7-Up, ginger ale and seltzer)  Coffee or tea (without milk or cream)  Gatorade    -  No Alcohol for at least 24 hours before surgery     Which medicines can I take?    Hold Aspirin for 7 days before surgery.   Hold Multivitamins for 7 days before surgery.  Hold Supplements for 7 days before surgery.  Hold Ibuprofen (Advil, Motrin) for 1 day before surgery--unless otherwise directed by surgeon.  Hold Naproxen (Aleve) for 4 days before surgery.    How do I prepare myself?  - Please take 2 showers before surgery using Scrubcare or Hibiclens soap.    Use this soap only from the neck to your toes.     Leave the soap on your skin for one minute--then rinse thoroughly.      You may  use your own shampoo and conditioner; no other hair products.   - Please remove all jewelry and body piercings.  - No lotions, deodorants or fragrance.  - Bring your ID and insurance card.    - All patients are required to have a Covid-19 test within 4 days of surgery/procedure.      -Patients will be contacted by the [facility] scheduling team within 1 week of surgery to make an appointment.      - Patients may call the Scheduling team at [phone] if they have not been scheduled within 4 days of  surgery.      ALL PATIENTS GOING HOME THE SAME DAY OF SURGERY ARE REQUIRED TO HAVE A RESPONSIBLE ADULT TO DRIVE AND BE IN ATTENDANCE WITH THEM FOR 24 HOURS FOLLOWING SURGERY     Questions or Concerns:    - For any questions regarding the day of surgery or your hospital stay, please contact the Pre Admission Nursing Office at [phone].       - If you have health changes between today and your surgery please call your surgeon.       For questions after surgery please call your surgeons office.

## 2021-01-12 ENCOUNTER — PATIENT OUTREACH (OUTPATIENT)
Dept: UROLOGY | Facility: CLINIC | Age: 26
End: 2021-01-12

## 2021-01-12 NOTE — TELEPHONE ENCOUNTER
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   Surgical procedure:  LYMPHADENECTOMY, RETROPERITONEUM; RESECTION OF RETROPERITONEAL MASS; LEFT RADICAL NEPHRECTOMY   Teaching Topic:  Pre and post op teaching  Person Involved in teaching: Cyrus Huertas    Motivation Level:  Asks Questions: Yes  Eager to Learn:  Yes  Cooperative: Yes  Receptive (willing/able to accept information):  Yes    Patient demonstrates understanding of the following:  Date of surgery:  1/18/21  Location of surgery:  [address] [place] [address]  History and Physical and any other testing necessary prior to surgery: Yes  Required time line for completion of History and Physical and any pre-op testing: Yes    Patient demonstrates understanding of the following:  Pre-op bowel prep:  None  Pre-op showering/scrub information with PCMX Soap: Yes  Blood thinner medications discussed and when to stop (if applicable):  Yes      Infection Prevention:   Patient demonstrates understanding of the following:  Surgical procedure site care taught: at time of discharge  Signs and symptoms of infection taught:  Yes      Post-op follow-up:  Discussed how to contact the hospital, nurse, and clinic scheduling staff if necessary.    Instructional materials used/given/mailed:  [facility] Booklet, post op teaching sheet, Map, Soap, and arrival/location information.    Surgical instructions packet mailed to patient.    Patient has a  and someone to stay with him for the first 24 hours.    Patient is aware of the need for COVID and is scheduled to be tested. He is also aware of the need for imaging and Tumor Markers and is scheduled to do so.

## 2021-01-14 ENCOUNTER — ANCILLARY PROCEDURE (OUTPATIENT)
Dept: CT IMAGING | Facility: CLINIC | Age: 26
End: 2021-01-14
Attending: UROLOGY
Payer: COMMERCIAL

## 2021-01-14 DIAGNOSIS — Z11.59 ENCOUNTER FOR SCREENING FOR OTHER VIRAL DISEASES: ICD-10-CM

## 2021-01-14 DIAGNOSIS — C77.2 MALIGNANT NEOPLASM OF TESTIS METASTATIC TO INTRA-ABDOMINAL LYMPH NODE (H): ICD-10-CM

## 2021-01-14 DIAGNOSIS — C62.90 MALIGNANT NEOPLASM OF TESTIS METASTATIC TO INTRA-ABDOMINAL LYMPH NODE (H): ICD-10-CM

## 2021-01-14 LAB
AFP SERPL-MCNC: 435.1 UG/L (ref 0–8)
ALBUMIN SERPL-MCNC: 3.9 G/DL (ref 3.4–5)
ALP SERPL-CCNC: 105 U/L (ref 40–150)
ALT SERPL W P-5'-P-CCNC: 35 U/L (ref 0–70)
ANION GAP SERPL CALCULATED.3IONS-SCNC: 3 MMOL/L (ref 3–14)
AST SERPL W P-5'-P-CCNC: 29 U/L (ref 0–45)
BILIRUB SERPL-MCNC: 0.2 MG/DL (ref 0.2–1.3)
BUN SERPL-MCNC: 17 MG/DL (ref 7–30)
CALCIUM SERPL-MCNC: 9.3 MG/DL (ref 8.5–10.1)
CHLORIDE SERPL-SCNC: 109 MMOL/L (ref 94–109)
CO2 SERPL-SCNC: 27 MMOL/L (ref 20–32)
CREAT SERPL-MCNC: 1.42 MG/DL (ref 0.66–1.25)
GFR SERPL CREATININE-BSD FRML MDRD: 67 ML/MIN/{1.73_M2}
GLUCOSE SERPL-MCNC: 110 MG/DL (ref 70–99)
HCG-TM SERPL-ACNC: 22 IU/L
LABORATORY COMMENT REPORT: NORMAL
LDH SERPL L TO P-CCNC: 252 U/L (ref 85–227)
POTASSIUM SERPL-SCNC: 3.9 MMOL/L (ref 3.4–5.3)
PROT SERPL-MCNC: 7.7 G/DL (ref 6.8–8.8)
SARS-COV-2 RNA RESP QL NAA+PROBE: NEGATIVE
SARS-COV-2 RNA RESP QL NAA+PROBE: NORMAL
SODIUM SERPL-SCNC: 139 MMOL/L (ref 133–144)
SPECIMEN SOURCE: NORMAL
SPECIMEN SOURCE: NORMAL

## 2021-01-14 PROCEDURE — 71260 CT THORAX DX C+: CPT | Mod: GC | Performed by: RADIOLOGY

## 2021-01-14 PROCEDURE — U0003 INFECTIOUS AGENT DETECTION BY NUCLEIC ACID (DNA OR RNA); SEVERE ACUTE RESPIRATORY SYNDROME CORONAVIRUS 2 (SARS-COV-2) (CORONAVIRUS DISEASE [COVID-19]), AMPLIFIED PROBE TECHNIQUE, MAKING USE OF HIGH THROUGHPUT TECHNOLOGIES AS DESCRIBED BY CMS-2020-01-R: HCPCS | Mod: 90 | Performed by: PATHOLOGY

## 2021-01-14 PROCEDURE — 36415 COLL VENOUS BLD VENIPUNCTURE: CPT | Performed by: PATHOLOGY

## 2021-01-14 PROCEDURE — 84702 CHORIONIC GONADOTROPIN TEST: CPT | Mod: 90 | Performed by: PATHOLOGY

## 2021-01-14 PROCEDURE — U0005 INFEC AGEN DETEC AMPLI PROBE: HCPCS | Mod: 90 | Performed by: PATHOLOGY

## 2021-01-14 PROCEDURE — 74177 CT ABD & PELVIS W/CONTRAST: CPT | Mod: GC | Performed by: RADIOLOGY

## 2021-01-14 PROCEDURE — 82105 ALPHA-FETOPROTEIN SERUM: CPT | Mod: 90 | Performed by: PATHOLOGY

## 2021-01-14 PROCEDURE — 80053 COMPREHEN METABOLIC PANEL: CPT | Performed by: PATHOLOGY

## 2021-01-14 PROCEDURE — 83615 LACTATE (LD) (LDH) ENZYME: CPT | Performed by: PATHOLOGY

## 2021-01-14 RX ORDER — IOPAMIDOL 755 MG/ML
91 INJECTION, SOLUTION INTRAVASCULAR ONCE
Status: COMPLETED | OUTPATIENT
Start: 2021-01-14 | End: 2021-01-14

## 2021-01-14 RX ADMIN — IOPAMIDOL 91 ML: 755 INJECTION, SOLUTION INTRAVASCULAR at 08:43

## 2021-01-18 ENCOUNTER — APPOINTMENT (OUTPATIENT)
Dept: GENERAL RADIOLOGY | Facility: CLINIC | Age: 26
End: 2021-01-18
Attending: STUDENT IN AN ORGANIZED HEALTH CARE EDUCATION/TRAINING PROGRAM
Payer: COMMERCIAL

## 2021-01-18 ENCOUNTER — NURSE TRIAGE (OUTPATIENT)
Dept: NURSING | Facility: CLINIC | Age: 26
End: 2021-01-18

## 2021-01-18 ENCOUNTER — ANCILLARY PROCEDURE (OUTPATIENT)
Dept: ULTRASOUND IMAGING | Facility: CLINIC | Age: 26
End: 2021-01-18
Payer: COMMERCIAL

## 2021-01-18 ENCOUNTER — ANESTHESIA (OUTPATIENT)
Dept: SURGERY | Facility: CLINIC | Age: 26
End: 2021-01-18
Payer: COMMERCIAL

## 2021-01-18 ENCOUNTER — HOSPITAL ENCOUNTER (INPATIENT)
Facility: CLINIC | Age: 26
LOS: 10 days | Discharge: HOME | End: 2021-01-28
Attending: UROLOGY | Admitting: UROLOGY
Payer: COMMERCIAL

## 2021-01-18 DIAGNOSIS — C79.02 MALIGNANT NEOPLASM METASTATIC TO LEFT KIDNEY (H): Primary | ICD-10-CM

## 2021-01-18 DIAGNOSIS — C77.2 MALIGNANT NEOPLASM OF TESTIS METASTATIC TO INTRA-ABDOMINAL LYMPH NODE (H): ICD-10-CM

## 2021-01-18 DIAGNOSIS — C62.90 MALIGNANT NEOPLASM OF TESTIS METASTATIC TO INTRA-ABDOMINAL LYMPH NODE (H): ICD-10-CM

## 2021-01-18 LAB
ABO + RH BLD: NORMAL
ABO + RH BLD: NORMAL
ANION GAP SERPL CALCULATED.3IONS-SCNC: 3 MMOL/L (ref 3–14)
ANION GAP SERPL CALCULATED.3IONS-SCNC: 6 MMOL/L (ref 3–14)
BASE DEFICIT BLDA-SCNC: 3.7 MMOL/L
BASE DEFICIT BLDA-SCNC: 3.8 MMOL/L
BASE DEFICIT BLDA-SCNC: 4.7 MMOL/L
BASE DEFICIT BLDA-SCNC: 6.1 MMOL/L
BASE DEFICIT BLDA-SCNC: 7.3 MMOL/L
BASE DEFICIT BLDV-SCNC: 3.9 MMOL/L
BLD GP AB SCN SERPL QL: NORMAL
BLD PROD TYP BPU: NORMAL
BLD UNIT ID BPU: 0
BLOOD BANK CMNT PATIENT-IMP: NORMAL
BLOOD PRODUCT CODE: NORMAL
BPU ID: NORMAL
BUN SERPL-MCNC: 17 MG/DL (ref 7–30)
BUN SERPL-MCNC: 18 MG/DL (ref 7–30)
CA-I BLD-MCNC: 4.4 MG/DL (ref 4.4–5.2)
CA-I BLD-MCNC: 4.5 MG/DL (ref 4.4–5.2)
CA-I BLD-MCNC: 4.7 MG/DL (ref 4.4–5.2)
CA-I BLD-MCNC: 4.7 MG/DL (ref 4.4–5.2)
CA-I BLD-MCNC: 5.1 MG/DL (ref 4.4–5.2)
CA-I BLD-MCNC: 5.5 MG/DL (ref 4.4–5.2)
CALCIUM SERPL-MCNC: 7.9 MG/DL (ref 8.5–10.1)
CALCIUM SERPL-MCNC: 8.9 MG/DL (ref 8.5–10.1)
CHLORIDE BLD-SCNC: 111 MMOL/L (ref 94–109)
CHLORIDE BLD-SCNC: 111 MMOL/L (ref 94–109)
CHLORIDE BLD-SCNC: 112 MMOL/L (ref 94–109)
CHLORIDE SERPL-SCNC: 115 MMOL/L (ref 94–109)
CHLORIDE SERPL-SCNC: 115 MMOL/L (ref 94–109)
CO2 SERPL-SCNC: 22 MMOL/L (ref 20–32)
CO2 SERPL-SCNC: 23 MMOL/L (ref 20–32)
CREAT SERPL-MCNC: 1.32 MG/DL (ref 0.66–1.25)
CREAT SERPL-MCNC: 1.38 MG/DL (ref 0.66–1.25)
ERYTHROCYTE [DISTWIDTH] IN BLOOD BY AUTOMATED COUNT: 18.4 % (ref 10–15)
ERYTHROCYTE [DISTWIDTH] IN BLOOD BY AUTOMATED COUNT: 18.9 % (ref 10–15)
GFR SERPL CREATININE-BSD FRML MDRD: 69 ML/MIN/{1.73_M2}
GFR SERPL CREATININE-BSD FRML MDRD: 73 ML/MIN/{1.73_M2}
GLUCOSE BLD-MCNC: 136 MG/DL (ref 70–99)
GLUCOSE BLD-MCNC: 154 MG/DL (ref 70–99)
GLUCOSE BLD-MCNC: 160 MG/DL (ref 70–99)
GLUCOSE BLD-MCNC: 168 MG/DL (ref 70–99)
GLUCOSE BLD-MCNC: 177 MG/DL (ref 70–99)
GLUCOSE BLD-MCNC: 191 MG/DL (ref 70–99)
GLUCOSE BLDC GLUCOMTR-MCNC: 132 MG/DL (ref 70–99)
GLUCOSE BLDC GLUCOMTR-MCNC: 97 MG/DL (ref 70–99)
GLUCOSE SERPL-MCNC: 133 MG/DL (ref 70–99)
GLUCOSE SERPL-MCNC: 153 MG/DL (ref 70–99)
HCO3 BLD-SCNC: 18 MMOL/L (ref 21–28)
HCO3 BLD-SCNC: 20 MMOL/L (ref 21–28)
HCO3 BLD-SCNC: 21 MMOL/L (ref 21–28)
HCO3 BLD-SCNC: 21 MMOL/L (ref 21–28)
HCO3 BLD-SCNC: 22 MMOL/L (ref 21–28)
HCO3 BLDV-SCNC: 22 MMOL/L (ref 21–28)
HCT VFR BLD AUTO: 32.8 % (ref 40–53)
HCT VFR BLD AUTO: 33.4 % (ref 40–53)
HCT VFR BLD AUTO: 33.5 % (ref 40–53)
HGB BLD-MCNC: 10.2 G/DL (ref 13.3–17.7)
HGB BLD-MCNC: 10.3 G/DL (ref 13.3–17.7)
HGB BLD-MCNC: 10.5 G/DL (ref 13.3–17.7)
HGB BLD-MCNC: 10.7 G/DL (ref 13.3–17.7)
HGB BLD-MCNC: 10.8 G/DL (ref 13.3–17.7)
HGB BLD-MCNC: 11 G/DL (ref 13.3–17.7)
HGB BLD-MCNC: 11.1 G/DL (ref 13.3–17.7)
HGB BLD-MCNC: 11.2 G/DL (ref 13.3–17.7)
HGB BLD-MCNC: 12.2 G/DL (ref 13.3–17.7)
LACTATE BLD-SCNC: 0.9 MMOL/L (ref 0.7–2)
LACTATE BLD-SCNC: 2.2 MMOL/L (ref 0.7–2)
LACTATE BLD-SCNC: 2.3 MMOL/L (ref 0.7–2)
LACTATE BLD-SCNC: 2.4 MMOL/L (ref 0.7–2)
LACTATE BLD-SCNC: 2.7 MMOL/L (ref 0.7–2)
LACTATE BLD-SCNC: 2.8 MMOL/L (ref 0.7–2)
LACTATE BLD-SCNC: 3.3 MMOL/L (ref 0.7–2)
MAGNESIUM SERPL-MCNC: 1.5 MG/DL (ref 1.6–2.3)
MCH RBC QN AUTO: 30.6 PG (ref 26.5–33)
MCH RBC QN AUTO: 31.1 PG (ref 26.5–33)
MCHC RBC AUTO-ENTMCNC: 32.3 G/DL (ref 31.5–36.5)
MCHC RBC AUTO-ENTMCNC: 32.8 G/DL (ref 31.5–36.5)
MCV RBC AUTO: 95 FL (ref 78–100)
MCV RBC AUTO: 95 FL (ref 78–100)
NUM BPU REQUESTED: 4
O2/TOTAL GAS SETTING VFR VENT: 32 %
O2/TOTAL GAS SETTING VFR VENT: 35 %
O2/TOTAL GAS SETTING VFR VENT: 37 %
OXYHGB MFR BLD: 97 % (ref 92–100)
OXYHGB MFR BLDV: 67 %
PCO2 BLD: 36 MM HG (ref 35–45)
PCO2 BLD: 37 MM HG (ref 35–45)
PCO2 BLD: 40 MM HG (ref 35–45)
PCO2 BLD: 41 MM HG (ref 35–45)
PCO2 BLD: 43 MM HG (ref 35–45)
PCO2 BLDV: 45 MM HG (ref 40–50)
PH BLD: 7.31 PH (ref 7.35–7.45)
PH BLD: 7.31 PH (ref 7.35–7.45)
PH BLD: 7.32 PH (ref 7.35–7.45)
PH BLD: 7.32 PH (ref 7.35–7.45)
PH BLD: 7.37 PH (ref 7.35–7.45)
PH BLDV: 7.31 PH (ref 7.32–7.43)
PHOSPHATE SERPL-MCNC: 4.3 MG/DL (ref 2.5–4.5)
PLATELET # BLD AUTO: 198 10E9/L (ref 150–450)
PLATELET # BLD AUTO: 243 10E9/L (ref 150–450)
PO2 BLD: 120 MM HG (ref 80–105)
PO2 BLD: 152 MM HG (ref 80–105)
PO2 BLD: 164 MM HG (ref 80–105)
PO2 BLD: 168 MM HG (ref 80–105)
PO2 BLD: 168 MM HG (ref 80–105)
PO2 BLDV: 37 MM HG (ref 25–47)
POTASSIUM BLD-SCNC: 4.5 MMOL/L (ref 3.4–5.3)
POTASSIUM BLD-SCNC: 4.5 MMOL/L (ref 3.4–5.3)
POTASSIUM BLD-SCNC: 4.6 MMOL/L (ref 3.4–5.3)
POTASSIUM BLD-SCNC: 5 MMOL/L (ref 3.4–5.3)
POTASSIUM BLD-SCNC: 5.4 MMOL/L (ref 3.4–5.3)
POTASSIUM BLD-SCNC: 5.5 MMOL/L (ref 3.4–5.3)
POTASSIUM SERPL-SCNC: 5 MMOL/L (ref 3.4–5.3)
POTASSIUM SERPL-SCNC: 5.7 MMOL/L (ref 3.4–5.3)
RBC # BLD AUTO: 3.53 10E12/L (ref 4.4–5.9)
RBC # BLD AUTO: 3.54 10E12/L (ref 4.4–5.9)
SODIUM BLD-SCNC: 137 MMOL/L (ref 133–144)
SODIUM BLD-SCNC: 138 MMOL/L (ref 133–144)
SODIUM BLD-SCNC: 139 MMOL/L (ref 133–144)
SODIUM BLD-SCNC: 139 MMOL/L (ref 133–144)
SODIUM BLD-SCNC: 140 MMOL/L (ref 133–144)
SODIUM BLD-SCNC: 140 MMOL/L (ref 133–144)
SODIUM SERPL-SCNC: 141 MMOL/L (ref 133–144)
SODIUM SERPL-SCNC: 142 MMOL/L (ref 133–144)
SPECIMEN EXP DATE BLD: NORMAL
TRANSFUSION STATUS PATIENT QL: NORMAL
WBC # BLD AUTO: 12 10E9/L (ref 4–11)
WBC # BLD AUTO: 12.5 10E9/L (ref 4–11)

## 2021-01-18 PROCEDURE — 88331 PATH CONSLTJ SURG 1 BLK 1SPC: CPT | Mod: TC | Performed by: UROLOGY

## 2021-01-18 PROCEDURE — 999N000015 HC STATISTIC ARTERIAL MONITORING DAILY

## 2021-01-18 PROCEDURE — 44120 REMOVAL OF SMALL INTESTINE: CPT | Mod: 52 | Performed by: COLON & RECTAL SURGERY

## 2021-01-18 PROCEDURE — 250N000011 HC RX IP 250 OP 636: Performed by: ANESTHESIOLOGY

## 2021-01-18 PROCEDURE — 88305 TISSUE EXAM BY PATHOLOGIST: CPT | Mod: TC | Performed by: UROLOGY

## 2021-01-18 PROCEDURE — 85018 HEMOGLOBIN: CPT | Performed by: STUDENT IN AN ORGANIZED HEALTH CARE EDUCATION/TRAINING PROGRAM

## 2021-01-18 PROCEDURE — 85027 COMPLETE CBC AUTOMATED: CPT | Performed by: STUDENT IN AN ORGANIZED HEALTH CARE EDUCATION/TRAINING PROGRAM

## 2021-01-18 PROCEDURE — 999N000141 HC STATISTIC PRE-PROCEDURE NURSING ASSESSMENT: Performed by: UROLOGY

## 2021-01-18 PROCEDURE — 82330 ASSAY OF CALCIUM: CPT | Performed by: UROLOGY

## 2021-01-18 PROCEDURE — 360N000077 HC SURGERY LEVEL 4, PER MIN: Performed by: UROLOGY

## 2021-01-18 PROCEDURE — 83605 ASSAY OF LACTIC ACID: CPT | Performed by: OBSTETRICS & GYNECOLOGY

## 2021-01-18 PROCEDURE — 250N000011 HC RX IP 250 OP 636: Performed by: STUDENT IN AN ORGANIZED HEALTH CARE EDUCATION/TRAINING PROGRAM

## 2021-01-18 PROCEDURE — 71045 X-RAY EXAM CHEST 1 VIEW: CPT | Mod: 26 | Performed by: RADIOLOGY

## 2021-01-18 PROCEDURE — 82947 ASSAY GLUCOSE BLOOD QUANT: CPT

## 2021-01-18 PROCEDURE — 200N000002 HC R&B ICU UMMC

## 2021-01-18 PROCEDURE — 80048 BASIC METABOLIC PNL TOTAL CA: CPT | Performed by: OBSTETRICS & GYNECOLOGY

## 2021-01-18 PROCEDURE — 80048 BASIC METABOLIC PNL TOTAL CA: CPT | Performed by: STUDENT IN AN ORGANIZED HEALTH CARE EDUCATION/TRAINING PROGRAM

## 2021-01-18 PROCEDURE — 272N000001 HC OR GENERAL SUPPLY STERILE: Performed by: UROLOGY

## 2021-01-18 PROCEDURE — 250N000013 HC RX MED GY IP 250 OP 250 PS 637: Performed by: STUDENT IN AN ORGANIZED HEALTH CARE EDUCATION/TRAINING PROGRAM

## 2021-01-18 PROCEDURE — 86923 COMPATIBILITY TEST ELECTRIC: CPT | Performed by: UROLOGY

## 2021-01-18 PROCEDURE — 0DTL0ZZ RESECTION OF TRANSVERSE COLON, OPEN APPROACH: ICD-10-PCS | Performed by: COLON & RECTAL SURGERY

## 2021-01-18 PROCEDURE — 84100 ASSAY OF PHOSPHORUS: CPT | Performed by: OBSTETRICS & GYNECOLOGY

## 2021-01-18 PROCEDURE — 250N000009 HC RX 250: Performed by: STUDENT IN AN ORGANIZED HEALTH CARE EDUCATION/TRAINING PROGRAM

## 2021-01-18 PROCEDURE — 0TT10ZZ RESECTION OF LEFT KIDNEY, OPEN APPROACH: ICD-10-PCS | Performed by: COLON & RECTAL SURGERY

## 2021-01-18 PROCEDURE — 0DCP7ZZ EXTIRPATION OF MATTER FROM RECTUM, VIA NATURAL OR ARTIFICIAL OPENING: ICD-10-PCS | Performed by: COLON & RECTAL SURGERY

## 2021-01-18 PROCEDURE — P9041 ALBUMIN (HUMAN),5%, 50ML: HCPCS | Performed by: ANESTHESIOLOGY

## 2021-01-18 PROCEDURE — 88305 TISSUE EXAM BY PATHOLOGIST: CPT | Mod: 26 | Performed by: PATHOLOGY

## 2021-01-18 PROCEDURE — 0DJD8ZZ INSPECTION OF LOWER INTESTINAL TRACT, VIA NATURAL OR ARTIFICIAL OPENING ENDOSCOPIC: ICD-10-PCS | Performed by: COLON & RECTAL SURGERY

## 2021-01-18 PROCEDURE — 82810 BLOOD GASES O2 SAT ONLY: CPT

## 2021-01-18 PROCEDURE — P9016 RBC LEUKOCYTES REDUCED: HCPCS | Performed by: UROLOGY

## 2021-01-18 PROCEDURE — 370N000017 HC ANESTHESIA TECHNICAL FEE, PER MIN: Performed by: UROLOGY

## 2021-01-18 PROCEDURE — 86901 BLOOD TYPING SEROLOGIC RH(D): CPT | Performed by: UROLOGY

## 2021-01-18 PROCEDURE — 82803 BLOOD GASES ANY COMBINATION: CPT

## 2021-01-18 PROCEDURE — 88307 TISSUE EXAM BY PATHOLOGIST: CPT | Mod: TC | Performed by: UROLOGY

## 2021-01-18 PROCEDURE — 84132 ASSAY OF SERUM POTASSIUM: CPT

## 2021-01-18 PROCEDURE — 82330 ASSAY OF CALCIUM: CPT

## 2021-01-18 PROCEDURE — 82435 ASSAY OF BLOOD CHLORIDE: CPT

## 2021-01-18 PROCEDURE — 250N000011 HC RX IP 250 OP 636: Performed by: UROLOGY

## 2021-01-18 PROCEDURE — 85027 COMPLETE CBC AUTOMATED: CPT | Performed by: OBSTETRICS & GYNECOLOGY

## 2021-01-18 PROCEDURE — 258N000003 HC RX IP 258 OP 636: Performed by: STUDENT IN AN ORGANIZED HEALTH CARE EDUCATION/TRAINING PROGRAM

## 2021-01-18 PROCEDURE — 710N000010 HC RECOVERY PHASE 1, LEVEL 2, PER MIN: Performed by: UROLOGY

## 2021-01-18 PROCEDURE — 250N000009 HC RX 250: Performed by: UROLOGY

## 2021-01-18 PROCEDURE — 0DTA0ZZ RESECTION OF JEJUNUM, OPEN APPROACH: ICD-10-PCS | Performed by: COLON & RECTAL SURGERY

## 2021-01-18 PROCEDURE — 93010 ELECTROCARDIOGRAM REPORT: CPT | Performed by: INTERNAL MEDICINE

## 2021-01-18 PROCEDURE — 0DB80ZZ EXCISION OF SMALL INTESTINE, OPEN APPROACH: ICD-10-PCS | Performed by: COLON & RECTAL SURGERY

## 2021-01-18 PROCEDURE — 0DTM0ZZ RESECTION OF DESCENDING COLON, OPEN APPROACH: ICD-10-PCS | Performed by: COLON & RECTAL SURGERY

## 2021-01-18 PROCEDURE — 999N000065 XR CHEST PORT 1 VW

## 2021-01-18 PROCEDURE — 07TD0ZZ RESECTION OF AORTIC LYMPHATIC, OPEN APPROACH: ICD-10-PCS | Performed by: COLON & RECTAL SURGERY

## 2021-01-18 PROCEDURE — 86900 BLOOD TYPING SEROLOGIC ABO: CPT | Performed by: UROLOGY

## 2021-01-18 PROCEDURE — 999N001017 HC STATISTIC GLUCOSE BY METER IP

## 2021-01-18 PROCEDURE — 271N000002 HC RX 271: Performed by: STUDENT IN AN ORGANIZED HEALTH CARE EDUCATION/TRAINING PROGRAM

## 2021-01-18 PROCEDURE — 88307 TISSUE EXAM BY PATHOLOGIST: CPT | Mod: 26 | Performed by: PATHOLOGY

## 2021-01-18 PROCEDURE — 83735 ASSAY OF MAGNESIUM: CPT | Performed by: OBSTETRICS & GYNECOLOGY

## 2021-01-18 PROCEDURE — 88331 PATH CONSLTJ SURG 1 BLK 1SPC: CPT | Mod: 26 | Performed by: PATHOLOGY

## 2021-01-18 PROCEDURE — 83605 ASSAY OF LACTIC ACID: CPT

## 2021-01-18 PROCEDURE — 0DTG0ZZ RESECTION OF LEFT LARGE INTESTINE, OPEN APPROACH: ICD-10-PCS | Performed by: COLON & RECTAL SURGERY

## 2021-01-18 PROCEDURE — 84295 ASSAY OF SERUM SODIUM: CPT | Performed by: UROLOGY

## 2021-01-18 PROCEDURE — 85014 HEMATOCRIT: CPT | Performed by: STUDENT IN AN ORGANIZED HEALTH CARE EDUCATION/TRAINING PROGRAM

## 2021-01-18 PROCEDURE — 44140 PARTIAL REMOVAL OF COLON: CPT | Mod: 52 | Performed by: COLON & RECTAL SURGERY

## 2021-01-18 PROCEDURE — 84132 ASSAY OF SERUM POTASSIUM: CPT | Performed by: UROLOGY

## 2021-01-18 PROCEDURE — 250N000025 HC SEVOFLURANE, PER MIN: Performed by: UROLOGY

## 2021-01-18 PROCEDURE — 86850 RBC ANTIBODY SCREEN: CPT | Performed by: UROLOGY

## 2021-01-18 PROCEDURE — 36415 COLL VENOUS BLD VENIPUNCTURE: CPT | Performed by: UROLOGY

## 2021-01-18 PROCEDURE — 999N000157 HC STATISTIC RCP TIME EA 10 MIN

## 2021-01-18 PROCEDURE — 83605 ASSAY OF LACTIC ACID: CPT | Performed by: UROLOGY

## 2021-01-18 PROCEDURE — 84295 ASSAY OF SERUM SODIUM: CPT

## 2021-01-18 PROCEDURE — 82947 ASSAY GLUCOSE BLOOD QUANT: CPT | Performed by: UROLOGY

## 2021-01-18 PROCEDURE — 82803 BLOOD GASES ANY COMBINATION: CPT | Performed by: UROLOGY

## 2021-01-18 RX ORDER — LIDOCAINE 40 MG/G
CREAM TOPICAL
Status: DISCONTINUED | OUTPATIENT
Start: 2021-01-18 | End: 2021-01-18 | Stop reason: HOSPADM

## 2021-01-18 RX ORDER — SODIUM CHLORIDE 9 MG/ML
INJECTION, SOLUTION INTRAVENOUS CONTINUOUS
Status: DISCONTINUED | OUTPATIENT
Start: 2021-01-18 | End: 2021-01-19

## 2021-01-18 RX ORDER — PHENYLEPHRINE HCL IN 0.9% NACL 50MG/250ML
0.5-6 PLASTIC BAG, INJECTION (ML) INTRAVENOUS CONTINUOUS
Status: DISCONTINUED | OUTPATIENT
Start: 2021-01-18 | End: 2021-01-18 | Stop reason: HOSPADM

## 2021-01-18 RX ORDER — SODIUM CHLORIDE, SODIUM GLUCONATE, SODIUM ACETATE, POTASSIUM CHLORIDE AND MAGNESIUM CHLORIDE 526; 502; 368; 37; 30 MG/100ML; MG/100ML; MG/100ML; MG/100ML; MG/100ML
INJECTION, SOLUTION INTRAVENOUS CONTINUOUS PRN
Status: DISCONTINUED | OUTPATIENT
Start: 2021-01-18 | End: 2021-01-18

## 2021-01-18 RX ORDER — ONDANSETRON 8 MG/1
TABLET, FILM COATED ORAL
Status: ON HOLD | COMMUNITY
Start: 2020-12-16 | End: 2021-01-27

## 2021-01-18 RX ORDER — CALCIUM CHLORIDE 100 MG/ML
INJECTION INTRAVENOUS; INTRAVENTRICULAR PRN
Status: DISCONTINUED | OUTPATIENT
Start: 2021-01-18 | End: 2021-01-18

## 2021-01-18 RX ORDER — SODIUM CHLORIDE, SODIUM LACTATE, POTASSIUM CHLORIDE, CALCIUM CHLORIDE 600; 310; 30; 20 MG/100ML; MG/100ML; MG/100ML; MG/100ML
INJECTION, SOLUTION INTRAVENOUS CONTINUOUS
Status: DISCONTINUED | OUTPATIENT
Start: 2021-01-18 | End: 2021-01-19

## 2021-01-18 RX ORDER — ALBUMIN HUMAN 5 %
INTRAVENOUS SOLUTION INTRAVENOUS PRN
Status: DISCONTINUED | OUTPATIENT
Start: 2021-01-18 | End: 2021-01-18

## 2021-01-18 RX ORDER — ONDANSETRON 2 MG/ML
4 INJECTION INTRAMUSCULAR; INTRAVENOUS EVERY 6 HOURS PRN
Status: DISCONTINUED | OUTPATIENT
Start: 2021-01-18 | End: 2021-01-18

## 2021-01-18 RX ORDER — AMOXICILLIN 250 MG
2 CAPSULE ORAL 2 TIMES DAILY
Status: DISCONTINUED | OUTPATIENT
Start: 2021-01-18 | End: 2021-01-19

## 2021-01-18 RX ORDER — ALBUMIN, HUMAN INJ 5% 5 %
12.5 SOLUTION INTRAVENOUS ONCE
Status: COMPLETED | OUTPATIENT
Start: 2021-01-18 | End: 2021-01-18

## 2021-01-18 RX ORDER — POLYETHYLENE GLYCOL 3350 17 G/17G
POWDER, FOR SOLUTION ORAL
Status: ON HOLD | COMMUNITY
Start: 2020-09-10 | End: 2021-01-27

## 2021-01-18 RX ORDER — ONDANSETRON 2 MG/ML
INJECTION INTRAMUSCULAR; INTRAVENOUS PRN
Status: DISCONTINUED | OUTPATIENT
Start: 2021-01-18 | End: 2021-01-18

## 2021-01-18 RX ORDER — CLINDAMYCIN PHOSPHATE 900 MG/50ML
900 INJECTION, SOLUTION INTRAVENOUS
Status: COMPLETED | OUTPATIENT
Start: 2021-01-18 | End: 2021-01-18

## 2021-01-18 RX ORDER — POLYETHYLENE GLYCOL 3350 17 G/17G
17 POWDER, FOR SOLUTION ORAL DAILY
Status: DISCONTINUED | OUTPATIENT
Start: 2021-01-19 | End: 2021-01-19

## 2021-01-18 RX ORDER — FENTANYL CITRATE 50 UG/ML
25-50 INJECTION, SOLUTION INTRAMUSCULAR; INTRAVENOUS
Status: DISCONTINUED | OUTPATIENT
Start: 2021-01-18 | End: 2021-01-18 | Stop reason: HOSPADM

## 2021-01-18 RX ORDER — PROPOFOL 10 MG/ML
INJECTION, EMULSION INTRAVENOUS PRN
Status: DISCONTINUED | OUTPATIENT
Start: 2021-01-18 | End: 2021-01-18

## 2021-01-18 RX ORDER — ONDANSETRON 2 MG/ML
4 INJECTION INTRAMUSCULAR; INTRAVENOUS EVERY 30 MIN PRN
Status: DISCONTINUED | OUTPATIENT
Start: 2021-01-18 | End: 2021-01-18 | Stop reason: HOSPADM

## 2021-01-18 RX ORDER — ONDANSETRON 4 MG/1
TABLET, FILM COATED ORAL
Status: ON HOLD | COMMUNITY
Start: 2020-11-04 | End: 2021-01-27

## 2021-01-18 RX ORDER — DEXTROSE MONOHYDRATE 25 G/50ML
25-50 INJECTION, SOLUTION INTRAVENOUS
Status: DISCONTINUED | OUTPATIENT
Start: 2021-01-18 | End: 2021-01-28 | Stop reason: HOSPADM

## 2021-01-18 RX ORDER — OXYCODONE HYDROCHLORIDE 5 MG/1
TABLET ORAL
Status: ON HOLD | COMMUNITY
Start: 2020-09-10 | End: 2021-01-27

## 2021-01-18 RX ORDER — LIDOCAINE HYDROCHLORIDE 20 MG/ML
INJECTION, SOLUTION INFILTRATION; PERINEURAL PRN
Status: DISCONTINUED | OUTPATIENT
Start: 2021-01-18 | End: 2021-01-18

## 2021-01-18 RX ORDER — KETAMINE HYDROCHLORIDE 10 MG/ML
INJECTION INTRAMUSCULAR; INTRAVENOUS PRN
Status: DISCONTINUED | OUTPATIENT
Start: 2021-01-18 | End: 2021-01-18

## 2021-01-18 RX ORDER — PROCHLORPERAZINE 25 MG
25 SUPPOSITORY, RECTAL RECTAL EVERY 12 HOURS PRN
Status: DISCONTINUED | OUTPATIENT
Start: 2021-01-18 | End: 2021-01-28 | Stop reason: HOSPADM

## 2021-01-18 RX ORDER — ONDANSETRON 4 MG/1
4 TABLET, ORALLY DISINTEGRATING ORAL EVERY 30 MIN PRN
Status: DISCONTINUED | OUTPATIENT
Start: 2021-01-18 | End: 2021-01-18 | Stop reason: HOSPADM

## 2021-01-18 RX ORDER — NALOXONE HYDROCHLORIDE 0.4 MG/ML
0.2 INJECTION, SOLUTION INTRAMUSCULAR; INTRAVENOUS; SUBCUTANEOUS
Status: DISCONTINUED | OUTPATIENT
Start: 2021-01-18 | End: 2021-01-18 | Stop reason: HOSPADM

## 2021-01-18 RX ORDER — FENTANYL CITRATE 50 UG/ML
INJECTION, SOLUTION INTRAMUSCULAR; INTRAVENOUS PRN
Status: DISCONTINUED | OUTPATIENT
Start: 2021-01-18 | End: 2021-01-18

## 2021-01-18 RX ORDER — SODIUM CHLORIDE, SODIUM LACTATE, POTASSIUM CHLORIDE, CALCIUM CHLORIDE 600; 310; 30; 20 MG/100ML; MG/100ML; MG/100ML; MG/100ML
INJECTION, SOLUTION INTRAVENOUS CONTINUOUS PRN
Status: DISCONTINUED | OUTPATIENT
Start: 2021-01-18 | End: 2021-01-18

## 2021-01-18 RX ORDER — NALOXONE HYDROCHLORIDE 0.4 MG/ML
0.4 INJECTION, SOLUTION INTRAMUSCULAR; INTRAVENOUS; SUBCUTANEOUS
Status: DISCONTINUED | OUTPATIENT
Start: 2021-01-18 | End: 2021-01-18 | Stop reason: HOSPADM

## 2021-01-18 RX ORDER — PROCHLORPERAZINE MALEATE 5 MG
10 TABLET ORAL EVERY 6 HOURS PRN
Status: DISCONTINUED | OUTPATIENT
Start: 2021-01-18 | End: 2021-01-28 | Stop reason: HOSPADM

## 2021-01-18 RX ORDER — CLINDAMYCIN PHOSPHATE 900 MG/50ML
900 INJECTION, SOLUTION INTRAVENOUS SEE ADMIN INSTRUCTIONS
Status: DISCONTINUED | OUTPATIENT
Start: 2021-01-18 | End: 2021-01-18 | Stop reason: HOSPADM

## 2021-01-18 RX ORDER — FLUMAZENIL 0.1 MG/ML
0.2 INJECTION, SOLUTION INTRAVENOUS
Status: DISCONTINUED | OUTPATIENT
Start: 2021-01-18 | End: 2021-01-18 | Stop reason: HOSPADM

## 2021-01-18 RX ORDER — HYDROMORPHONE HYDROCHLORIDE 1 MG/ML
.3-.5 INJECTION, SOLUTION INTRAMUSCULAR; INTRAVENOUS; SUBCUTANEOUS EVERY 5 MIN PRN
Status: DISCONTINUED | OUTPATIENT
Start: 2021-01-18 | End: 2021-01-18 | Stop reason: HOSPADM

## 2021-01-18 RX ORDER — AMLODIPINE BESYLATE 5 MG/1
TABLET ORAL
Status: ON HOLD | COMMUNITY
Start: 2020-09-22 | End: 2021-01-27

## 2021-01-18 RX ORDER — DEXAMETHASONE 4 MG/1
TABLET ORAL
Status: ON HOLD | COMMUNITY
Start: 2020-12-16 | End: 2021-01-27

## 2021-01-18 RX ORDER — AMOXICILLIN 250 MG
1 CAPSULE ORAL 2 TIMES DAILY
Status: DISCONTINUED | OUTPATIENT
Start: 2021-01-18 | End: 2021-01-19

## 2021-01-18 RX ORDER — ONDANSETRON 4 MG/1
4 TABLET, ORALLY DISINTEGRATING ORAL EVERY 6 HOURS PRN
Status: DISCONTINUED | OUTPATIENT
Start: 2021-01-18 | End: 2021-01-18

## 2021-01-18 RX ORDER — PROCHLORPERAZINE MALEATE 10 MG
TABLET ORAL
COMMUNITY
Start: 2020-10-14 | End: 2021-08-27

## 2021-01-18 RX ORDER — SODIUM CHLORIDE, SODIUM LACTATE, POTASSIUM CHLORIDE, CALCIUM CHLORIDE 600; 310; 30; 20 MG/100ML; MG/100ML; MG/100ML; MG/100ML
INJECTION, SOLUTION INTRAVENOUS CONTINUOUS
Status: DISCONTINUED | OUTPATIENT
Start: 2021-01-18 | End: 2021-01-18 | Stop reason: HOSPADM

## 2021-01-18 RX ORDER — NICOTINE POLACRILEX 4 MG
15-30 LOZENGE BUCCAL
Status: DISCONTINUED | OUTPATIENT
Start: 2021-01-18 | End: 2021-01-28 | Stop reason: HOSPADM

## 2021-01-18 RX ORDER — DEXTROSE MONOHYDRATE 25 G/50ML
INJECTION, SOLUTION INTRAVENOUS PRN
Status: DISCONTINUED | OUTPATIENT
Start: 2021-01-18 | End: 2021-01-18

## 2021-01-18 RX ORDER — ONDANSETRON 4 MG/1
4 TABLET, ORALLY DISINTEGRATING ORAL EVERY 6 HOURS PRN
Status: DISCONTINUED | OUTPATIENT
Start: 2021-01-18 | End: 2021-01-28 | Stop reason: HOSPADM

## 2021-01-18 RX ORDER — ACETAMINOPHEN 325 MG/1
650 TABLET ORAL EVERY 4 HOURS
Status: DISCONTINUED | OUTPATIENT
Start: 2021-01-18 | End: 2021-01-21

## 2021-01-18 RX ORDER — ONDANSETRON 2 MG/ML
4 INJECTION INTRAMUSCULAR; INTRAVENOUS EVERY 6 HOURS PRN
Status: DISCONTINUED | OUTPATIENT
Start: 2021-01-18 | End: 2021-01-28 | Stop reason: HOSPADM

## 2021-01-18 RX ORDER — HEPARIN SODIUM 5000 [USP'U]/.5ML
5000 INJECTION, SOLUTION INTRAVENOUS; SUBCUTANEOUS
Status: COMPLETED | OUTPATIENT
Start: 2021-01-18 | End: 2021-01-18

## 2021-01-18 RX ADMIN — CALCIUM CHLORIDE 500 MG: 100 INJECTION INTRAVENOUS; INTRAVENTRICULAR at 15:03

## 2021-01-18 RX ADMIN — HEPARIN SODIUM 5000 UNITS: 5000 INJECTION, SOLUTION INTRAVENOUS; SUBCUTANEOUS at 11:30

## 2021-01-18 RX ADMIN — CLINDAMYCIN PHOSPHATE 900 MG: 900 INJECTION, SOLUTION INTRAVENOUS at 09:53

## 2021-01-18 RX ADMIN — Medication 100 MCG: at 12:54

## 2021-01-18 RX ADMIN — Medication 200 MCG: at 12:57

## 2021-01-18 RX ADMIN — FENTANYL CITRATE 100 MCG: 50 INJECTION, SOLUTION INTRAMUSCULAR; INTRAVENOUS at 09:53

## 2021-01-18 RX ADMIN — FENTANYL CITRATE 50 MCG: 50 INJECTION, SOLUTION INTRAMUSCULAR; INTRAVENOUS at 08:44

## 2021-01-18 RX ADMIN — ACETAMINOPHEN 650 MG: 325 TABLET, FILM COATED ORAL at 23:50

## 2021-01-18 RX ADMIN — CLINDAMYCIN PHOSPHATE 900 MG: 900 INJECTION, SOLUTION INTRAVENOUS at 15:30

## 2021-01-18 RX ADMIN — Medication 100 MCG: at 13:24

## 2021-01-18 RX ADMIN — Medication 100 MCG: at 13:21

## 2021-01-18 RX ADMIN — Medication 100 MCG: at 10:24

## 2021-01-18 RX ADMIN — Medication 30 MG: at 14:15

## 2021-01-18 RX ADMIN — PROPOFOL 30 MG: 10 INJECTION, EMULSION INTRAVENOUS at 11:54

## 2021-01-18 RX ADMIN — KETAMINE HYDROCHLORIDE 30 MG: 10 INJECTION INTRAMUSCULAR; INTRAVENOUS at 08:44

## 2021-01-18 RX ADMIN — DEXTROSE MONOHYDRATE 25 ML: 25 INJECTION, SOLUTION INTRAVENOUS at 16:32

## 2021-01-18 RX ADMIN — Medication 250 ML: at 14:09

## 2021-01-18 RX ADMIN — HYDROMORPHONE HYDROCHLORIDE 0.5 MG: 1 INJECTION, SOLUTION INTRAMUSCULAR; INTRAVENOUS; SUBCUTANEOUS at 21:04

## 2021-01-18 RX ADMIN — Medication 200 MCG: at 10:46

## 2021-01-18 RX ADMIN — Medication 30 MG: at 09:53

## 2021-01-18 RX ADMIN — FENTANYL CITRATE 50 MCG: 50 INJECTION, SOLUTION INTRAMUSCULAR; INTRAVENOUS at 14:43

## 2021-01-18 RX ADMIN — Medication 50 MG: at 08:44

## 2021-01-18 RX ADMIN — Medication 20 MG: at 10:28

## 2021-01-18 RX ADMIN — Medication 100 MCG: at 10:17

## 2021-01-18 RX ADMIN — PROPOFOL 120 MG: 10 INJECTION, EMULSION INTRAVENOUS at 08:44

## 2021-01-18 RX ADMIN — ONDANSETRON 4 MG: 2 INJECTION INTRAMUSCULAR; INTRAVENOUS at 19:14

## 2021-01-18 RX ADMIN — SODIUM CHLORIDE: 9 INJECTION, SOLUTION INTRAVENOUS at 19:50

## 2021-01-18 RX ADMIN — Medication 10 MG: at 17:18

## 2021-01-18 RX ADMIN — CALCIUM CHLORIDE 500 MG: 100 INJECTION INTRAVENOUS; INTRAVENTRICULAR at 15:56

## 2021-01-18 RX ADMIN — FENTANYL CITRATE 50 MCG: 50 INJECTION, SOLUTION INTRAMUSCULAR; INTRAVENOUS at 18:55

## 2021-01-18 RX ADMIN — FENTANYL CITRATE 50 MCG: 50 INJECTION, SOLUTION INTRAMUSCULAR; INTRAVENOUS at 20:42

## 2021-01-18 RX ADMIN — Medication 100 MCG: at 13:52

## 2021-01-18 RX ADMIN — Medication 20 MG: at 10:56

## 2021-01-18 RX ADMIN — SODIUM CHLORIDE, SODIUM LACTATE, POTASSIUM CHLORIDE, CALCIUM CHLORIDE: 600; 310; 30; 20 INJECTION, SOLUTION INTRAVENOUS at 08:47

## 2021-01-18 RX ADMIN — Medication 250 ML: at 17:48

## 2021-01-18 RX ADMIN — ONDANSETRON 4 MG: 2 INJECTION INTRAMUSCULAR; INTRAVENOUS at 18:15

## 2021-01-18 RX ADMIN — SODIUM CHLORIDE, POTASSIUM CHLORIDE, SODIUM LACTATE AND CALCIUM CHLORIDE 1000 ML: 600; 310; 30; 20 INJECTION, SOLUTION INTRAVENOUS at 23:32

## 2021-01-18 RX ADMIN — Medication 100 MCG: at 13:13

## 2021-01-18 RX ADMIN — HYDROMORPHONE HYDROCHLORIDE 0.5 MG: 1 INJECTION, SOLUTION INTRAMUSCULAR; INTRAVENOUS; SUBCUTANEOUS at 19:14

## 2021-01-18 RX ADMIN — MIDAZOLAM 1 MG: 1 INJECTION INTRAMUSCULAR; INTRAVENOUS at 07:47

## 2021-01-18 RX ADMIN — FENTANYL CITRATE 50 MCG: 50 INJECTION, SOLUTION INTRAMUSCULAR; INTRAVENOUS at 14:52

## 2021-01-18 RX ADMIN — SODIUM CHLORIDE, SODIUM LACTATE, POTASSIUM CHLORIDE, CALCIUM CHLORIDE: 600; 310; 30; 20 INJECTION, SOLUTION INTRAVENOUS at 17:18

## 2021-01-18 RX ADMIN — Medication 30 MG: at 11:20

## 2021-01-18 RX ADMIN — FENTANYL CITRATE 50 MCG: 50 INJECTION, SOLUTION INTRAMUSCULAR; INTRAVENOUS at 20:16

## 2021-01-18 RX ADMIN — FENTANYL CITRATE 50 MCG: 50 INJECTION, SOLUTION INTRAMUSCULAR; INTRAVENOUS at 18:36

## 2021-01-18 RX ADMIN — KETAMINE HYDROCHLORIDE 10 MG: 10 INJECTION INTRAMUSCULAR; INTRAVENOUS at 15:45

## 2021-01-18 RX ADMIN — Medication 14 ML/HR: at 16:27

## 2021-01-18 RX ADMIN — Medication 0.5 MCG/KG/MIN: at 11:00

## 2021-01-18 RX ADMIN — FENTANYL CITRATE 50 MCG: 50 INJECTION, SOLUTION INTRAMUSCULAR; INTRAVENOUS at 19:46

## 2021-01-18 RX ADMIN — Medication 100 MCG: at 10:43

## 2021-01-18 RX ADMIN — Medication 10 MG: at 17:44

## 2021-01-18 RX ADMIN — Medication: at 19:55

## 2021-01-18 RX ADMIN — FENTANYL CITRATE 50 MCG: 50 INJECTION, SOLUTION INTRAMUSCULAR; INTRAVENOUS at 10:27

## 2021-01-18 RX ADMIN — Medication 20 MG: at 12:51

## 2021-01-18 RX ADMIN — FENTANYL CITRATE 50 MCG: 50 INJECTION, SOLUTION INTRAMUSCULAR; INTRAVENOUS at 19:28

## 2021-01-18 RX ADMIN — SODIUM CHLORIDE, SODIUM GLUCONATE, SODIUM ACETATE, POTASSIUM CHLORIDE AND MAGNESIUM CHLORIDE: 526; 502; 368; 37; 30 INJECTION, SOLUTION INTRAVENOUS at 14:32

## 2021-01-18 RX ADMIN — ALBUMIN HUMAN 12.5 G: 0.05 INJECTION, SOLUTION INTRAVENOUS at 19:43

## 2021-01-18 RX ADMIN — KETAMINE HYDROCHLORIDE 10 MG: 10 INJECTION INTRAMUSCULAR; INTRAVENOUS at 17:29

## 2021-01-18 RX ADMIN — Medication 50 MEQ: at 16:32

## 2021-01-18 RX ADMIN — Medication 20 MG: at 15:00

## 2021-01-18 RX ADMIN — LIDOCAINE HYDROCHLORIDE 100 MG: 20 INJECTION, SOLUTION INFILTRATION; PERINEURAL at 08:44

## 2021-01-18 RX ADMIN — FENTANYL CITRATE 50 MCG: 50 INJECTION, SOLUTION INTRAMUSCULAR; INTRAVENOUS at 07:48

## 2021-01-18 RX ADMIN — Medication 100 MCG: at 10:13

## 2021-01-18 RX ADMIN — Medication 100 MCG: at 13:43

## 2021-01-18 RX ADMIN — SODIUM CHLORIDE, POTASSIUM CHLORIDE, SODIUM LACTATE AND CALCIUM CHLORIDE: 600; 310; 30; 20 INJECTION, SOLUTION INTRAVENOUS at 23:26

## 2021-01-18 RX ADMIN — CALCIUM CHLORIDE 500 MG: 100 INJECTION INTRAVENOUS; INTRAVENTRICULAR at 17:26

## 2021-01-18 RX ADMIN — FENTANYL CITRATE 50 MCG: 50 INJECTION, SOLUTION INTRAMUSCULAR; INTRAVENOUS at 11:52

## 2021-01-18 RX ADMIN — MIDAZOLAM 1 MG: 1 INJECTION INTRAMUSCULAR; INTRAVENOUS at 08:05

## 2021-01-18 ASSESSMENT — COPD QUESTIONNAIRES: COPD: 0

## 2021-01-18 ASSESSMENT — MIFFLIN-ST. JEOR
SCORE: 1627.63
SCORE: 1619.75

## 2021-01-18 NOTE — OP NOTE
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 Same; tumor involves the mesentery of the left colon, possibly invading into the colon itself  2. Very redundant rectum and sigmoid  3. Injury to and possibly involvement of proximal jejunum     PROCEDURES:      1. Exploratory laparotomy  2. Resection of L retroperitoneal mass with en block L nephrectomy and L colectomy  3. Manual disimpaction, complex  4. Flexible sigmoidoscopy, washout of rectal stump  5. Stapled side-to-side, functional end-to-end transverse to sigmoid colon anastomosis  6. Resection of segment of proximal jejunum, with hand-sewn side-to-side, functional end-to-end anastomosis     INDICATION: Mr. Huertas is a pleasant 25-year-old man who presented in September 2020 with a left testicular mass. He underwent a left transinguinal orchiectomy which was notable for a mixed germ cell tumor, predominantly yolk sac type. AFP was 123,828. He was staged as IIIB ([id number]), and underwent 5 cycles of VIP. He had a persistent L retroperitoneal mass, which shrank from 22 to 13 cm. Dr. Major has consented the patient for RPLND and resection of mass, along with left nephrectomy. We were called intra-op for assistance with resection of left colon, and proximal jejunal resection.       SURGEON:  Ruma Angela MD      ASSISTANT:  Juan Perez MD - Colorectal Fellow    DESCRIPTION OF PROCEDURE:      The patient was prepped and draped in lithotomy position. Dr. Major's team had already performed the laparotomy, and complete mobilization of the left retroperitoneal mass + kidney off of the inferior vena cava and aorta, psoas, pancreas, stomach, and left retroperitoneum.     The inferior mesenteric artery was involved with the mass, and ligated with two burns of the impact Ligasure. Afterwards, the descending  and sigmoid colon remained well-perfused, indicating good collateral flow through marginals. The patient was not bowel-prepped and had palpable stool in his colon. We manually milked the stool anterograde and retrograde into the specimen portion, prior to any stapling. On the transverse colon, we divided the distal transverse colon with a 100 mm blue ESTEVAN stapler to the distal to the mid colic artery. The mesentery was taken with a Ligasure to the window created by Dr. Major's resection. We divided the sigmoid colon in the middle portion, leaving a well-perfused conduit that easily reached the transverse colonic stump without any tension. Thus, the left colon was isolated, and his team completed the en-bloc resection of the mass. We performed a flexible sigmoidoscopy, and manually irrigated and disimpacted a generous amount of impacted stool from his rectum and sigmoid stump. This ensured that the bowel distal to the anastomosis would be obstruction-free. After the mass was removed en bloc as documented in Dr. Major's operative note, we performed a side-to-side functional end-to-end anastomosis of the transverse colon to the descending colon. We made corner colotomies and inserted a ESTEVAN 100 mm blue load to create the common channel. This channel was closed with a TA-90 stapler. The staple line was rinsed with betadine. A corner stitch was placed with a 3-0 vicryl. Using the sigmoidoscope, we submerged the anastomosis and inspected the anastomosis from the luminal side and it appeared air tight and hemostatic with a leak test. A segment of the proximal jejunum was next resected dividing the intestine with ESTEVAN blue load. We oversewed the ends and then created a hand-sewn side-to-side functional end-to-end anastomosis. We placed a back row of interrupted 3-0 silk, and created two parallel longitudinal enterotomies. We closed this common channel with two 3-0 vicryl sutures in continuous running manner for the back wall  and in a Kilo manner for the front wall. We reinforced this anastomosis with a front row of interrupted 3-0 silk in a Lembert fashion. Dr. Major's team then performed the abdominal closure. The patient tolerated the procedure well without evident complications.  Sponge, needle and instrument counts were reported as correct at the conclusion of case x 2. Dr. Angela was present for and scrubbed for the entirety of the colorectal portion of the case.        Juan Perez MD  Colorectal Fellow  1/18/2021      I was present for the entire case, as documented. I edited the operative note for clarity, completeness, and accuracy.    Ruma Angela MD  Colon and Rectal Surgery Attending  Department of Surgery  [facility]

## 2021-01-18 NOTE — ANESTHESIA PROCEDURE NOTES
Airway   Date/Time: 1/18/2021 8:46 AM   Patient location during procedure: OR  Staff -   Anesthesiologist:  Janna Butler MD  Resident/Fellow: Fabricio Tran MD  Performed By: resident    Consent for Airway   Urgency: elective    Indications and Patient Condition  Indications for airway management: eduard-procedural  Induction type:intravenousMask difficulty assessment: 1 - vent by mask    Final Airway Details  Final airway type: endotracheal airway  Successful airway:ETT - single  Endotracheal Airway Details   ETT size (mm): 7.5  Cuffed: yes  Successful intubation technique: direct laryngoscopy  Grade View of Cords: 2  Adjucts: stylet  Measured from: lips  Secured at (cm): 23  Secured with: pink tape and plastic tape  Bite block used: None    Post intubation assessment   Placement verified by: capnometry and chest rise   Number of attempts at approach: 1  Secured with:pink tape and plastic tape  Ease of procedure: easy  Dentition: Intact and Unchanged

## 2021-01-18 NOTE — ANESTHESIA PROCEDURE NOTES
Central Line Procedure Note    Staff -   Anesthesiologist:  Edilberto Niño MD  Resident/Fellow: Fabricio Tran MD  Performed By: resident and anesthesiologist  Procedure performed by resident/CRNA in presence of a teaching physician.    Location: In OR after induction  Procedure Start/Stop Times:     patient identified, IV checked, site marked, risks and benefits discussed, informed consent, monitors and equipment checked, pre-op evaluation and at physician/surgeon's request      Correct Patient: Yes      Correct Position: Yes      Correct Site: Yes      Correct Procedure: Yes      Correct Laterality:  N/A    Site Marked:  N/A  Line Placement:     Procedure:  Central Line    Insertion laterality:  Right    Insertion site:  Internal Jugular    Position:  Trendelenburg    Sterility preparation included the following: hand hygiene performed prior to central venous catheter insertion, maximum sterile barriers were used: cap, mask, sterile gown, sterile gloves, and large sterile sheet, antiseptic used during central venous catheter insertion and skin prep agent completely dried prior to procedure  Medical reason for not performing maximal sterile barrier technique:  No         Injection Technique:  Ultrasound guided    Sterile Ultrasound Technique:  Sterile probe cover and Sterile gel    Vein evaluated via U/S for patency/adequacy of catheter insertion and is adequate.  Using realtime U/S imaging the vein was punctured, and needle was observed entering vein on U/S      Permanent Image entered into patient's record      Local skin infiltration:  None    Catheter size:  9 Fr, 2 lumen 11.5 cm (MAC)    Catheter length at skin (cm):  12    Cath secured with: suture      Dressing:  Tegaderm and Biopatch    Complications:  None obvious    Blood aspirated all lumens: Yes      All Lumens Flushed: Yes      Verification method:  Placement to be verified post-op and Ultrasound    Person verifying:  Renay{

## 2021-01-18 NOTE — PROGRESS NOTES
Temp:  [98  F (36.7  C)-98.3  F (36.8  C)] 98  F (36.7  C)  Pulse:  [] 115  Resp:  [14-18] 14  BP: (130-144)/(78-94) 136/89  SpO2:  [98 %-100 %] 100 %    Bilateral erector spinae block performed without complications, 2 mg versed, 50 mcg fentanyl given.  NSR, VSS, 2L O2 via NC.  Pt tolerated well.  Pre-op heparin NOT given. Needs to be given 1 hour after block- notified OR nurse and CRNA. Block completed at 0816.

## 2021-01-18 NOTE — TELEPHONE ENCOUNTER
Patient is scheduled for surgery this morning and has already checked in     Mother is concerned as they were told he would receive a call after CT and prior to surgery and he never did.     Explained that we do not have her listed as someone we can speak to and that she should have the patient make it so she is.     Also the surgeon will talk with patient prior to surgery so questions may be asked at that time.     Mother is agreeable.       Additional Information    [1] Caller is not with the adult (patient) AND [2] probable NON-URGENT symptoms    Protocols used: INFORMATION ONLY CALL-A-    Kajal Wade RN on 1/18/2021 at 6:02 AM

## 2021-01-18 NOTE — PROGRESS NOTES
Discussed care with Dr. Niño. Patient pre-op -120. /94.     Heparin ordered per surgical team. Patient will be getting block- hold heparin until after block.

## 2021-01-18 NOTE — ANESTHESIA PROCEDURE NOTES
Arterial Line Procedure Note    Staff -   Anesthesiologist:  Edilberto Niño MD  Resident/Fellow: Fabricio Tran MD  Performed By: resident  Procedure performed by resident/CRNA in presence of a teaching physician.    Location: In OR After Induction  Procedure Start/Stop Times:     patient identified, IV checked, site marked, risks and benefits discussed, informed consent, monitors and equipment checked, pre-op evaluation and at physician/surgeon's request      Correct Patient: Yes      Correct Position: Yes      Correct Site: Yes      Correct Procedure: Yes      Correct Laterality:  N/A    Site Marked:  N/A  Line Placement:     Procedure:  Arterial Line    Insertion Site:  Radial    Insertion laterality:  Left    Skin Prep: Chloraprep      Patient Prep: patient draped, mask, sterile gloves, hat and hand hygiene      Local skin infiltration:  None    Ultrasound Guided?: Yes      Artery evaluated via ultrasound confirming patency.   Using realtime imaging, the artery was punctured and the needle was observed entering the artery.      A permanent image is entered into patient's chart.      Catheter size:  20 gauge, 12 cm    Cath secured with: anchor securement device      Dressing:  Tegaderm    Complications:  None obvious    Arterial waveform: Yes      IBP within 10% of NIBP: Yes

## 2021-01-18 NOTE — ANESTHESIA PROCEDURE NOTES
Pre-Procedure   Staff -   Anesthesiologist:  Eden Mosqueda MD  Resident/Fellow: Tiff Parker MD  Performed By: resident  Location: pre-op  Pre-Anesthestic Checklist: patient identified, IV checked, site marked, risks and benefits discussed, informed consent, monitors and equipment checked, pre-op evaluation, at physician/surgeon's request and post-op pain management  Timeout:  Correct Patient: Yes   Correct Procedure: Yes   Correct Site: Yes   Correct Position: Yes   Correct Laterality: Yes   Site Marked: Yes    Procedure Documentation  Procedure: Erector spinae  Laterality: bilateral  Patient Position:prone  Patient Prep/Sterile Barriers: sterile gloves, mask, Chloraprep, patient draped  Local skin infiltrated with 5 mL of 1% lidocaine.   Insertion Site: T5-6.  Needle type: Touhy needle  Needle Gauge: 17.   Catheter: 19 G.    Catheter threaded easily.    Ultrasound guided, Ultrasound used to identify targeted nerve, plexus, vascular marker, or fascial plane and place a needle adjacent to it in real-time, Ultrasound was used to visualize the spread of anesthetic in close proximity to the above referenced structure  A permanent image is entered into the patient's record.  The nerve(s) appeared anatomically normal, There were no apparent abnormal pathologic findings    Assessment/Narrative      The placement was negative for: blood aspirated, painful injection and site bleeding  Paresthesias: No.  Bolus given via. No blood aspirated via catheter.   Secured via Tegaderm and Dermabond.   Insertion/Infusion Method: Continuous Infusion  Complications: none

## 2021-01-19 ENCOUNTER — APPOINTMENT (OUTPATIENT)
Dept: OCCUPATIONAL THERAPY | Facility: CLINIC | Age: 26
End: 2021-01-19
Attending: UROLOGY
Payer: COMMERCIAL

## 2021-01-19 LAB
ALBUMIN SERPL-MCNC: 2.1 G/DL (ref 3.4–5)
ANION GAP SERPL CALCULATED.3IONS-SCNC: 5 MMOL/L (ref 3–14)
ANION GAP SERPL CALCULATED.3IONS-SCNC: 6 MMOL/L (ref 3–14)
BUN SERPL-MCNC: 20 MG/DL (ref 7–30)
BUN SERPL-MCNC: 23 MG/DL (ref 7–30)
CA-I BLD-MCNC: 4.7 MG/DL (ref 4.4–5.2)
CA-I SERPL ISE-MCNC: 4.7 MG/DL (ref 4.4–5.2)
CALCIUM SERPL-MCNC: 7.7 MG/DL (ref 8.5–10.1)
CALCIUM SERPL-MCNC: 8.1 MG/DL (ref 8.5–10.1)
CHLORIDE SERPL-SCNC: 112 MMOL/L (ref 94–109)
CHLORIDE SERPL-SCNC: 113 MMOL/L (ref 94–109)
CO2 SERPL-SCNC: 24 MMOL/L (ref 20–32)
CO2 SERPL-SCNC: 24 MMOL/L (ref 20–32)
CREAT SERPL-MCNC: 1.68 MG/DL (ref 0.66–1.25)
CREAT SERPL-MCNC: 1.88 MG/DL (ref 0.66–1.25)
ERYTHROCYTE [DISTWIDTH] IN BLOOD BY AUTOMATED COUNT: 18.6 % (ref 10–15)
GFR SERPL CREATININE-BSD FRML MDRD: 48 ML/MIN/{1.73_M2}
GFR SERPL CREATININE-BSD FRML MDRD: 55 ML/MIN/{1.73_M2}
GLUCOSE BLDC GLUCOMTR-MCNC: 114 MG/DL (ref 70–99)
GLUCOSE BLDC GLUCOMTR-MCNC: 136 MG/DL (ref 70–99)
GLUCOSE SERPL-MCNC: 121 MG/DL (ref 70–99)
GLUCOSE SERPL-MCNC: 136 MG/DL (ref 70–99)
HCT VFR BLD AUTO: 31.2 % (ref 40–53)
HGB BLD-MCNC: 10.1 G/DL (ref 13.3–17.7)
INTERPRETATION ECG - MUSE: NORMAL
LACTATE BLD-SCNC: 2.2 MMOL/L (ref 0.7–2)
MAGNESIUM SERPL-MCNC: 2.8 MG/DL (ref 1.6–2.3)
MCH RBC QN AUTO: 31.1 PG (ref 26.5–33)
MCHC RBC AUTO-ENTMCNC: 32.4 G/DL (ref 31.5–36.5)
MCV RBC AUTO: 96 FL (ref 78–100)
PHOSPHATE SERPL-MCNC: 5.1 MG/DL (ref 2.5–4.5)
PLATELET # BLD AUTO: 186 10E9/L (ref 150–450)
POTASSIUM SERPL-SCNC: 5.1 MMOL/L (ref 3.4–5.3)
POTASSIUM SERPL-SCNC: 5.6 MMOL/L (ref 3.4–5.3)
RBC # BLD AUTO: 3.25 10E12/L (ref 4.4–5.9)
SODIUM SERPL-SCNC: 141 MMOL/L (ref 133–144)
SODIUM SERPL-SCNC: 142 MMOL/L (ref 133–144)
WBC # BLD AUTO: 11.7 10E9/L (ref 4–11)

## 2021-01-19 PROCEDURE — 80048 BASIC METABOLIC PNL TOTAL CA: CPT | Performed by: PHYSICIAN ASSISTANT

## 2021-01-19 PROCEDURE — 97530 THERAPEUTIC ACTIVITIES: CPT | Mod: GO | Performed by: OCCUPATIONAL THERAPIST

## 2021-01-19 PROCEDURE — 99223 1ST HOSP IP/OBS HIGH 75: CPT | Mod: AI | Performed by: SURGERY

## 2021-01-19 PROCEDURE — 80048 BASIC METABOLIC PNL TOTAL CA: CPT | Performed by: STUDENT IN AN ORGANIZED HEALTH CARE EDUCATION/TRAINING PROGRAM

## 2021-01-19 PROCEDURE — 99291 CRITICAL CARE FIRST HOUR: CPT | Performed by: SURGERY

## 2021-01-19 PROCEDURE — 250N000011 HC RX IP 250 OP 636: Performed by: STUDENT IN AN ORGANIZED HEALTH CARE EDUCATION/TRAINING PROGRAM

## 2021-01-19 PROCEDURE — 97535 SELF CARE MNGMENT TRAINING: CPT | Mod: GO | Performed by: OCCUPATIONAL THERAPIST

## 2021-01-19 PROCEDURE — 83735 ASSAY OF MAGNESIUM: CPT | Performed by: STUDENT IN AN ORGANIZED HEALTH CARE EDUCATION/TRAINING PROGRAM

## 2021-01-19 PROCEDURE — 82040 ASSAY OF SERUM ALBUMIN: CPT | Performed by: PHYSICIAN ASSISTANT

## 2021-01-19 PROCEDURE — 250N000013 HC RX MED GY IP 250 OP 250 PS 637: Performed by: STUDENT IN AN ORGANIZED HEALTH CARE EDUCATION/TRAINING PROGRAM

## 2021-01-19 PROCEDURE — 93005 ELECTROCARDIOGRAM TRACING: CPT

## 2021-01-19 PROCEDURE — 82040 ASSAY OF SERUM ALBUMIN: CPT | Performed by: STUDENT IN AN ORGANIZED HEALTH CARE EDUCATION/TRAINING PROGRAM

## 2021-01-19 PROCEDURE — 258N000001 HC RX 258: Performed by: PHYSICIAN ASSISTANT

## 2021-01-19 PROCEDURE — 82330 ASSAY OF CALCIUM: CPT | Performed by: STUDENT IN AN ORGANIZED HEALTH CARE EDUCATION/TRAINING PROGRAM

## 2021-01-19 PROCEDURE — 250N000011 HC RX IP 250 OP 636: Performed by: PHYSICIAN ASSISTANT

## 2021-01-19 PROCEDURE — 82330 ASSAY OF CALCIUM: CPT | Performed by: UROLOGY

## 2021-01-19 PROCEDURE — 83605 ASSAY OF LACTIC ACID: CPT | Performed by: STUDENT IN AN ORGANIZED HEALTH CARE EDUCATION/TRAINING PROGRAM

## 2021-01-19 PROCEDURE — 85027 COMPLETE CBC AUTOMATED: CPT | Performed by: STUDENT IN AN ORGANIZED HEALTH CARE EDUCATION/TRAINING PROGRAM

## 2021-01-19 PROCEDURE — 97165 OT EVAL LOW COMPLEX 30 MIN: CPT | Mod: GO | Performed by: OCCUPATIONAL THERAPIST

## 2021-01-19 PROCEDURE — 84100 ASSAY OF PHOSPHORUS: CPT | Performed by: STUDENT IN AN ORGANIZED HEALTH CARE EDUCATION/TRAINING PROGRAM

## 2021-01-19 PROCEDURE — 258N000003 HC RX IP 258 OP 636: Performed by: STUDENT IN AN ORGANIZED HEALTH CARE EDUCATION/TRAINING PROGRAM

## 2021-01-19 PROCEDURE — 200N000002 HC R&B ICU UMMC

## 2021-01-19 PROCEDURE — 999N001017 HC STATISTIC GLUCOSE BY METER IP

## 2021-01-19 RX ORDER — METHOCARBAMOL 750 MG/1
750 TABLET, FILM COATED ORAL 4 TIMES DAILY PRN
Status: DISCONTINUED | OUTPATIENT
Start: 2021-01-19 | End: 2021-01-28 | Stop reason: HOSPADM

## 2021-01-19 RX ORDER — NALOXONE HYDROCHLORIDE 0.4 MG/ML
0.2 INJECTION, SOLUTION INTRAMUSCULAR; INTRAVENOUS; SUBCUTANEOUS
Status: DISCONTINUED | OUTPATIENT
Start: 2021-01-19 | End: 2021-01-19

## 2021-01-19 RX ORDER — OXYCODONE HYDROCHLORIDE 5 MG/1
5-10 TABLET ORAL EVERY 4 HOURS PRN
Status: DISCONTINUED | OUTPATIENT
Start: 2021-01-19 | End: 2021-01-19

## 2021-01-19 RX ORDER — HEPARIN SODIUM 5000 [USP'U]/.5ML
5000 INJECTION, SOLUTION INTRAVENOUS; SUBCUTANEOUS EVERY 8 HOURS
Status: DISCONTINUED | OUTPATIENT
Start: 2021-01-19 | End: 2021-01-21 | Stop reason: CLARIF

## 2021-01-19 RX ORDER — HYDROMORPHONE HCL IN WATER/PF 6 MG/30 ML
.2-.4 PATIENT CONTROLLED ANALGESIA SYRINGE INTRAVENOUS
Status: DISCONTINUED | OUTPATIENT
Start: 2021-01-19 | End: 2021-01-25

## 2021-01-19 RX ORDER — NALOXONE HYDROCHLORIDE 0.4 MG/ML
0.2 INJECTION, SOLUTION INTRAMUSCULAR; INTRAVENOUS; SUBCUTANEOUS
Status: ACTIVE | OUTPATIENT
Start: 2021-01-19 | End: 2021-01-20

## 2021-01-19 RX ORDER — OXYCODONE HYDROCHLORIDE 10 MG/1
10 TABLET ORAL EVERY 6 HOURS SCHEDULED
Status: DISCONTINUED | OUTPATIENT
Start: 2021-01-19 | End: 2021-01-19

## 2021-01-19 RX ORDER — OXYCODONE HYDROCHLORIDE 5 MG/1
5-10 TABLET ORAL EVERY 4 HOURS PRN
Status: DISCONTINUED | OUTPATIENT
Start: 2021-01-19 | End: 2021-01-25 | Stop reason: ALTCHOICE

## 2021-01-19 RX ORDER — NALOXONE HYDROCHLORIDE 0.4 MG/ML
0.4 INJECTION, SOLUTION INTRAMUSCULAR; INTRAVENOUS; SUBCUTANEOUS
Status: DISCONTINUED | OUTPATIENT
Start: 2021-01-19 | End: 2021-01-19

## 2021-01-19 RX ORDER — NALOXONE HYDROCHLORIDE 0.4 MG/ML
0.4 INJECTION, SOLUTION INTRAMUSCULAR; INTRAVENOUS; SUBCUTANEOUS
Status: ACTIVE | OUTPATIENT
Start: 2021-01-19 | End: 2021-01-20

## 2021-01-19 RX ORDER — MAGNESIUM SULFATE HEPTAHYDRATE 40 MG/ML
4 INJECTION, SOLUTION INTRAVENOUS ONCE
Status: COMPLETED | OUTPATIENT
Start: 2021-01-19 | End: 2021-01-19

## 2021-01-19 RX ADMIN — OXYCODONE HYDROCHLORIDE 10 MG: 10 TABLET ORAL at 13:23

## 2021-01-19 RX ADMIN — PROCHLORPERAZINE EDISYLATE 10 MG: 5 INJECTION INTRAMUSCULAR; INTRAVENOUS at 08:56

## 2021-01-19 RX ADMIN — ACETAMINOPHEN 650 MG: 325 TABLET, FILM COATED ORAL at 19:39

## 2021-01-19 RX ADMIN — HEPARIN SODIUM 5000 UNITS: 5000 INJECTION, SOLUTION INTRAVENOUS; SUBCUTANEOUS at 16:10

## 2021-01-19 RX ADMIN — OXYCODONE HYDROCHLORIDE 10 MG: 5 TABLET ORAL at 19:39

## 2021-01-19 RX ADMIN — SODIUM CHLORIDE 500 ML: 9 INJECTION, SOLUTION INTRAVENOUS at 10:35

## 2021-01-19 RX ADMIN — HEPARIN SODIUM 5000 UNITS: 5000 INJECTION, SOLUTION INTRAVENOUS; SUBCUTANEOUS at 09:41

## 2021-01-19 RX ADMIN — ACETAMINOPHEN 650 MG: 325 TABLET, FILM COATED ORAL at 04:14

## 2021-01-19 RX ADMIN — DEXTROSE MONOHYDRATE AND SODIUM CHLORIDE: 5; .45 INJECTION, SOLUTION INTRAVENOUS at 18:02

## 2021-01-19 RX ADMIN — SODIUM CHLORIDE 500 ML: 9 INJECTION, SOLUTION INTRAVENOUS at 12:29

## 2021-01-19 RX ADMIN — HYDROMORPHONE HYDROCHLORIDE 0.2 MG: 0.2 INJECTION, SOLUTION INTRAMUSCULAR; INTRAVENOUS; SUBCUTANEOUS at 22:24

## 2021-01-19 RX ADMIN — ACETAMINOPHEN 650 MG: 325 TABLET, FILM COATED ORAL at 16:09

## 2021-01-19 RX ADMIN — ACETAMINOPHEN 650 MG: 325 TABLET, FILM COATED ORAL at 07:57

## 2021-01-19 RX ADMIN — HYDROMORPHONE HYDROCHLORIDE 0.2 MG: 0.2 INJECTION, SOLUTION INTRAMUSCULAR; INTRAVENOUS; SUBCUTANEOUS at 16:32

## 2021-01-19 RX ADMIN — ACETAMINOPHEN 650 MG: 325 TABLET, FILM COATED ORAL at 11:32

## 2021-01-19 RX ADMIN — CALCIUM GLUCONATE 1 G: 98 INJECTION, SOLUTION INTRAVENOUS at 03:08

## 2021-01-19 RX ADMIN — DEXTROSE MONOHYDRATE AND SODIUM CHLORIDE: 5; .45 INJECTION, SOLUTION INTRAVENOUS at 09:37

## 2021-01-19 RX ADMIN — SODIUM CHLORIDE, POTASSIUM CHLORIDE, SODIUM LACTATE AND CALCIUM CHLORIDE 1000 ML: 600; 310; 30; 20 INJECTION, SOLUTION INTRAVENOUS at 00:55

## 2021-01-19 RX ADMIN — MAGNESIUM SULFATE IN WATER 4 G: 40 INJECTION, SOLUTION INTRAVENOUS at 00:55

## 2021-01-19 ASSESSMENT — ACTIVITIES OF DAILY LIVING (ADL)
ADLS_ACUITY_SCORE: 16
ADLS_ACUITY_SCORE: 14
ADLS_ACUITY_SCORE: 16
PREVIOUS_RESPONSIBILITIES: MEAL PREP;HOUSEKEEPING;LAUNDRY;SHOPPING;YARDWORK;MEDICATION MANAGEMENT;FINANCES;DRIVING
ADLS_ACUITY_SCORE: 16
TOILETING_ISSUES: NO
ADLS_ACUITY_SCORE: 16
ADLS_ACUITY_SCORE: 16

## 2021-01-19 NOTE — PROGRESS NOTES
SURGICAL ICU PROGRESS NOTE  January 19, 2021    Critical Care Services Progress Note:     Cyrus Huertas Jr. remains critically ill with mild tachycardia, hyperkalemia and oliguria after small bowel, large bowel and renal resections secondary to testicular cancer.  He has been extubated but remains tachycardic and is on supplemental oxygen.   I personally examined and evaluated the patient today.   The patient s prognosis today is improved with completion of his complex intra-abdominal operative procedure.  I have evaluated all laboratory values and imaging studies from the past 24 hours.  Key findings and decisions made today included identification of mild hyperkalemia which we will follow-up and assessment for oliguria.  I personally managed the fluids, analgesia and sedation, and respiratory support.  Consults ongoing and ordered are Nutrition and Pharmacology.  Therapies.  Procedures that will happen today are reassessment for hyperkalemia, weaning of supplemental oxygen, continued fluid administration and follow-up of tachycardia.  Follow-up for oliguria.  All treatments were placed at my direction.  I formulated today s plan with Dr. Andre and the house staff team or resident(s) and agree with the findings and plan in the associated note.       The above plans and care have been discussed with family members as available and all questions and concerns were addressed.     I spent a total of 30 minutes (excluding procedure time) personally providing and directing critical care services at the bedside and on the critical care unit for Cyrus Huertas Jr..        Ishmael Ivan MD      PRIMARY TEAM: Urology  PRIMARY PHYSICIAN: Dr. Major  REASON FOR CRITICAL CARE ADMISSION: Hemodynamic Monitoring   ADMITTING PHYSICIAN: Dr. Srinivasan  Date of Service (when I saw the patient): 01/18/2021     ASSESSMENT:  Cyrus Huertas Jr. 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 Post-operatively with tachycardia 130s-140s admitted to ICU for hemodynamic monitoring.     PLAN:     Neurological:  # Acute pain   - Monitor neurological status. Delirium preventions and precautions.   - Pain: Tylenol 650 q4h, Dilaudid PCA 0.2, 1.2 mg hour limit                            Pulmonary:   - Supplemental oxygen to keep saturation above 92 %.  - Incentive spirometer every 15- 30 minutes when awake.     Cardiovascular:    #Tachycardia  - likely multi-factorial from hypovolemia 2/2 insensible losses in prolonged open abdominal case, sympathetic hyper-activity, etc  - will start with volume resuscitation - 1L LR bolus now and re-evaluate  - EKG now     # Shock-distributive now resolved  - Monitor hemodynamic status. .   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 Will continue to monitor intake  and output.  - Townsend in place  - Switch mIVF to D5+1/5NS @125cc/hr     Endocrine:  # Stress Hyperglycemia  - No intervention indicated.     ID:  # Post-op leukocytosis expected   - no indications for antibiotics.   - continue to monitor for signs/sxs of infection      Positive cultures:  - N/A     Heme:     # Acute blood loss anemia   # Anemia of critical illness   -  s/p 2u prbc  - Hb 10.1  - Transfuse if hgb <7.0 or signs/symptoms of hypoperfusion. Monitor and trend.      Musculoskeletal:   # Weakness and deconditioning of critical illness   - Physical and occupational therapy consult      Skin:  # Midline incision  - management per surgical teams     General Cares/Prophylaxis:    DVT Prophylaxis: Pneumatic Compression Devices, start subcutaneous heparin 5000U  GI Prophylaxis: Not indicated  Restraints: Restraints for medical healing needed: NO     Lines/ tubes/ drains:  - R MAC line  - Arterial line  - PIV x2  - Townsend     Disposition:  -  Surgical ICU.      Patient seen, findings and plan discussed with surgical ICU staff, Dr. Ivan.    Leandro Andre  PGY1 Gen Surg/Neurosurg  [phone]  ====================================    TODAY'S SUBJECTIVE/INTERVAL HISTORY:   No major acute events overnight    OBJECTIVE:     Temp:  [95.5  F (35.3  C)-98.5  F (36.9  C)] 97.8  F (36.6  C)  Pulse:  [] 123  Resp:  [8-19] 9  BP: ()/(46-91) 90/51  MAP:  [60 mmHg-83 mmHg] 60 mmHg  Arterial Line BP: ()/(50-70) 67/54  SpO2:  [97 %-100 %] 100 %  Resp: 9      I/O last 3 completed shifts:  In: 6925 [P.O.:25; I.V.:5300]  Out: 2000 [Urine:1250; Blood:750]    General: A&Ox3, NAD, drowsy  HEENT:PERRL  Neck:R MAC line in place, hemostatic   Neuro: moves all extremities, follows commands  Pulm/Resp: Clear breath sounds bilaterally without rhonchi, crackles or wheeze,  breathing non-labored  CV: tachycardic, regular rhythm  Abdomen: Soft, non-distended, appropriately tender to palpation, no rebound tenderness or  guarding, no masses  :  ibarra catheter in place, urine yellow and clear  Incisions/Skin: midline incision with dressing with old blood, previous left groin incision well healed  MSK/Extremities: no peripheral edema, moving all extremities, peripheral pulses intact, calves nontender, extremities well perfused    LABS:   Arterial Blood Gases   Recent Labs   Lab 01/18/21  1803 01/18/21  1626 01/18/21  1551 01/18/21  1422   PH 7.37 7.31* 7.31* 7.32*   PCO2 37 40 36 41   PO2 152* 164* 168* 120*   HCO3 21 20* 18* 21     Complete Blood Count   Recent Labs   Lab 01/19/21 0443 01/18/21  2320 01/18/21  1920 01/18/21  1803   WBC 11.7* 12.5* 12.0*  --    HGB 10.1* 10.8* 11.0* 10.7*    198 243  --      Basic Metabolic Panel  Recent Labs   Lab 01/19/21 0443 01/18/21  2320 01/18/21  1920 01/18/21  1803 01/14/21  0829 01/14/21  0829    141 142 139   < > 139   POTASSIUM 5.6* 5.7* 5.0 4.5   < > 3.9   CHLORIDE 113* 115* 115* 112*   < > 109   CO2 24 23 22  --   --  27   BUN 20 18 17  --   --  17   CR 1.68* 1.38* 1.32*  --   --  1.42*   * 133* 153* 136*   < > 110*    < > = values in this interval not displayed.     Liver Function Tests  Recent Labs   Lab 01/14/21  0829   AST 29   ALT 35   ALKPHOS 105   BILITOTAL 0.2   ALBUMIN 3.9     Pancreatic Enzymes  No lab results found in last 7 days.  Coagulation Profile  No lab results found in last 7 days.      IMAGING:   Recent Results (from the past 24 hour(s))   XR Chest Port 1 View    Narrative    EXAM: XR CHEST PORT 1 VW  1/18/2021 7:20 PM     HISTORY:  please confirm CVC placement       COMPARISON:  1/14/2021    TECHNIQUE: Single frontal radiograph of the chest    FINDINGS:   Right IJ central venous catheter tip projects over the high SVC. Right  chest Port-A-Cath tip projects over the right atrium.    Midline trachea. Well-defined cardiomediastinal silhouette. Distinct  pulmonary vasculature. No consolidation, pleural effusion or  appreciable pneumothorax.       Impression    IMPRESSION:   1. Right IJ central venous catheter tip projects over the high SVC.   2. Right chest Port-A-Cath tip projects over the right atrium.   3. Lungs are clear.     I have personally reviewed the examination and initial interpretation  and I agree with the findings.    STACY MCKEE MD

## 2021-01-19 NOTE — OP NOTE
OPERATIVE REPORT  1/18/2021     PREOPERATIVE DIAGNOSIS:    1. Testicular cancer - metastatic  2. Retroperitoneal lymphadenopathy    POSTOPERATIVE DIAGNOSIS:   1. Testicular cancer - metastatic  2. Metastatic retroperitoneal lymphadenopathy  3. Retroperitoneal mass involving left kidney, descending colonic mesentery, proximal jejunum    PROCEDURES PERFORMED:   1.Retroperitoneal lymph node dissection with resection of retroperitoneal mass (12 cm in size)  2.Left radical nephrectomy  3.Proximal jejunectomy  4.Descending and transverse colectomy with primary anastomosis    STAFF SURGEON:   MD Ruma Raines MD (Jejunectomy and colectomy)    RESIDENT/FELLOW(S):  Melissa Gu MD; Frank Araujo MD; Juan Perez MD    ANESTHESIA: Combined General with Block    ESTIMATED BLOOD LOSS: 750 mL.     DRAINS: 16Fr ibarra catheter     SIGNIFICANT FINDINGS:  Large para-aortic mass with direct invasion of the left kidney, left ureter, colonic mesentery and jejunum. Mass removed en bloc with colon and kidney. Given area of suspected invasion of loop of jejunum, this was resected. Dr. Angela of Colorectal Surgery performed the jejunectomy and colectomy with primary anastomoses. Notable for severe colonic and rectal distension with large stool burden. Suspicious nodules noted within the jejunal mesentery and along peritoneum, resected with jejunectomy. Bowel resection was necessitated by invasive nature of mass and inherent to the nature of this procedure.     SPECIMEN(S):  ID Type Source Tests Collected by Time Destination   A : Peritoneal Implant Tissue Peritoneum SURGICAL PATHOLOGY EXAM Frank Major MD 1/18/2021 10:30 AM    B : Left Kidney, eduard-aortic mass, descending and transverse colon  Tissue Kidney, Left SURGICAL PATHOLOGY EXAM Frank Major MD 1/18/2021  2:58 PM    C : Intra aortic caval lymph node  Tissue Lymph Node SURGICAL PATHOLOGY EXAM Frank Major  MD Ahmet 1/18/2021  3:24 PM    D : Left spermatic cord  Tissue Other SURGICAL PATHOLOGY EXAM Frank Major MD 1/18/2021  3:30 PM    E : Jejunum  Tissue Colon SURGICAL PATHOLOGY EXAM Frank Major MD 1/18/2021  4:06 PM    F : Para Caval Lymph Node Tissue Lymph Node SURGICAL PATHOLOGY EXAM Frank Major MD 1/18/2021  5:45 PM    G : intra aortic caval Lymph Node #2 Tissue Lymph Node SURGICAL PATHOLOGY EXAM Frank Major MD 1/18/2021  5:47 PM        OPERATIVE INDICATIONS:   Cyrus Huertas is a(n) 25 year old male with a 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 He had markedly elevated tumor markers on diagnosis and large retroperitoneal mass.The patient was counseled on the alternatives, risks, and benefits and elected to proceed with the above stated procedure.    DESCRIPTION OF PROCEDURE:   After verification of informed consent was obtained, the patient was brought to the operating room, and moved to the operating table. After adequate anesthesia was induced, the patient was repositioned in the supine position and prepped and draped in the usual sterile fashion. A formal timeout was performed to confirm the correct patient, procedure and operative site.     A midline incision was then made and the rectus fascia incised. Preperitoneal fat and the falciform ligament were then divided from the umbilicus and a section of it was resected. We then extruded the omentum and the bowel to access the retroperitoneum. A vertical retroperitoneal incision was made parallel to the IVC and was taken around the cecum all the way up the right paracolic gutter. The cecum and the right colon were then completely mobilized all the way up to the inferior pole of the right kidney. Once the small bowel was completely mobilized, dissection was carried out medially with carefully dissecting off the retroperitoneal attachments of the duodenum and  identifying the left renal vein crossing over the aorta. The mass was noted in this para-aortic location. The lacteal lymphatics were individually clipped and divided to expose the left renal vein. At this point, the Grant retractor was placed.    We then turned our attention to the known masses. Within several loops of jejunum and transverse colon, the smaller mass was identified and dissected free, posteriorly, it was found that this actually attached to the larger mass, rather than existing as an individual mass. With this mobilized, dissection was carried toward the mass and the jejunum was mobilized. During this dissection, it was noted that the serosa of the jejunum and areas of the mesentery were densely adherent to the mass, suspicious for invasion. There were also several serosal nodules and a peritoneal nodule on this section of jejunum. This was ultimately mobilized free, but planned for later resection given the suspicious characteristics. We then proceeded in attempt to mobilize the descending colon. It should be noted that the sigmoid colon was markedly redundant and dilated, and extensive stool burden was noted in the rectum. It became apparent during dissection that the mesentery was densely adherent to the mass and appeared grossly invaded. Dr. Angela of Colorectal Surgery was consulted, and she agreed that this segment of descending and transverse colon would not be salvageable given the mesenteric involvement.  As such, a portion of the transverse colon and descending colon were resected, en bloc with the mass. Please see separate dictation regarding jejunal and colonic resection. With the bowel then fully mobilized, dissection was carried lateral to and posterior to the mass. The mass was grossly invading the lower pole of the kidney, and these structures were removed en bloc. The posterior dissection was notable for dense scar tissue and possible invasion of the psoas muscle. With  electrocautery and the Ligasure, this was ultimately freed. Attention was then turned medially to the aorta. Again, there was dense scar tissue in this area. The left common iliac artery was identified and dissection was carried along the anterior aspect of the vessel, superior to the bifurcation, and along the anterior wall of the aorta to the level of the left renal vein. The left renal vein was isolated with a vessel loop. A plane along the lateral edge of the aorta was carefully identified, and meticulous dissection was used to mobilize the mass and all associated para-aortic lymphatic tissue. Lumbar arteries were ligated and divided. Clips and cautery were used for hemostasis and to ligate lymphatic channels. Dissection was carried superiorly to the left renal artery. This was ligated with 0-silk suture and clips. The renal vein was then ligated with 0-silk suture and clips and divided. The artery and all remaining attachments were then divided. The left renal artery stump was oversewn with a 4-0 prolene stick tie. Excellent hemostasis was noted.    With the para-aortic mass removed. Dissection was then carried into the intra-aortocaval space. Lymphatic tissue was mobilized and removed using clips for hemostasis. We then incised longitudinally along the anterior aspect of the IVC. The right ureter was identified and isolated with a vessel loop.  The right paracaval tissue was dissected all the way from the right renal hilum down to the common iliac artery with the lateral border of the right ureter.  In addition lumbar veins were individually ligated, clipped and divided to allow all tissue posterior to the IVC to be removed.  All visible lymphatic channels were clipped and divided. The cord vessels on the left were then identified and traced all the way down to the internal inguinal ring and they were disconnected at the internal inguinal ring where they had previously been ligated and divided.       Julio César then proceeded to complete the small bowel resection and perform primary colon and jejunal anastomoses, Please see her dictation for complete details.     Once we were satisfied that adequate hemostasis had been obtained and all lumbar veins had been adequately controlled, we placed Surgicel snow in the retroperitoneum. The bowel was then run and no injuries were noted and it was replaced in the abdomen. The components of the bowel were placed in their anatomic position. The fascia was then reapproximated using #0 looped PDS. Subcutaneous tissue was reapproximated using 3-0 vicryl subcutaneous suture, and skin was closed using staples. The patient tolerated the procedure well. There were no complications.  All sponge, needle and instrument counts were correct and doubly verified prior to closure.      Frank Major MD  Urology  [facility]

## 2021-01-19 NOTE — PROGRESS NOTES
Anisa MARTINES provided handoff @ pt bedside in PACU @ 2125  Informed pt on hold for ICU  PACU discharge criteria met @ 2100   Pt arrive to PACU @ 1853 from OR  Pt off robi since 2000    @ Dr. Ishmael Villegas @ pt bedside @ 2125   Dr. Behrens called @ 2125 asked for sign out. Stated he would place it and would also like to speak to Dr. Quiñones the ICU resident who is @ the bedside.   @ 2130 Dr. Behrens @ pt bedside   Pt tolerating PO intake without difficulty including ice chips  Pt alert lying in bed appears to be resting comfortably. Wakes spontaneously and to voice. Follows commands and answers questions appropriately.   On Q pump in place dual catheter   R internal jugular CVC  1)brown port SL @ this time   2)white port NS @ 100 ml/hr and PCA in line with NS  PIV x2 SL   PCA verified   @ 2130 PCA button handed to pt. Education provided verbally to pt with demonstration and teach back method. Pt appears to understand education provided and verbally confirms understanding of PCA use. Reinforcement maybe needed.

## 2021-01-19 NOTE — PROGRESS NOTES
"[facility] Nerve Block Daily Progress Note  01/19/21      Cyrus Huertas Jr. 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  - Patient resting in bed, reports good pain control with nerve block infusion and Dilaudid PCA 0.2 mg dose Q 10 min lockout used 5 doses over past 3 hrs.  Currently rating abdominal pain 4/10 at rest, 5/10 with activity.  Tolerating nerve block infusion and declined local anesthetic bolus today. Denies LE weakness, paresthesias, circumoral numbness, metallic taste, tinnitus  - Drains: Townsend catheter   - Diet: NPO      Antithrombotic or Thrombolytic Therapy ordered:   heparin ANTICOAGULANT injection 5,000 Units, SC, Q8H       Analgesic Medications ordered:  Medications related to Pain Management (From now, onward)    Start     Dose/Rate Route Frequency Ordered Stop    01/19/21 0316  methocarbamol (ROBAXIN) tablet 750 mg      750 mg Oral 4 TIMES DAILY PRN 01/19/21 0316      01/18/21 2330  acetaminophen (TYLENOL) tablet 650 mg      650 mg Oral EVERY 4 HOURS 01/18/21 2309      01/18/21 2130  HYDROmorphone (DILAUDID) PCA 0.2 mg/mL OPIOID NAIVE (age less than 65 years)       Intravenous CONTINUOUS 01/18/21 2116      01/18/21 0930  ropivacaine 0.2% (NAROPIN) 750 mL in ON-Q C-Bloc select flow ([id number] holds 600-750 mL) dual cath disposable pump      14 mL/hr  Irrigation CONTINUOUS 01/18/21 0907              Objective  Exam  Vitals:   /57   Pulse 116   Temp 97.4  F (36.3  C) (Oral)   Resp 10   Ht 1.702 m (5' 7.01\")   Wt 68.6 kg (151 lb " 3.8 oz)   SpO2 98%   BMI 23.68 kg/m        General: Alert, oriented, NAD  MSK/Neuro: Normal Strength BLE, 5/5 and overall symmetric.    Skin: bilateral erector spinae (ES) catheter sites with dressings c/d/i, no tenderness, erythema, heme, edema       Labs/Diagnostics  Heme/INR:  Recent Labs   Lab Test 01/19/21  0443 01/18/21  2320   WBC 11.7* 12.5*   RBC 3.25* 3.53*   HGB 10.1* 10.8*   HCT 31.2* 33.4*   MCV 96 95   MCH 31.1 30.6   MCHC 32.4 32.3   RDW 18.6* 18.9*    198       No results found for: INR       Assessment/Plan  ASSESSMENT  POD #1 s/p exploratory laparotomy, resection of proximal jejunum and left colectomy with resection of left retroperitoneal mass and left nephrectomy; bilateral T5-6 erector spinae (ES) catheters placed on 1/18/21 for acute postoperative analgesia      Pain well controlled, tolerating local anesthetic nerve block infusion and understands PRN local anesthetic bolus is available if needed.  Motor function intact. No evidence of adverse side effects related to local anesthetic continuous infusion. Patient is meeting activity goals.     PLAN  - Catheter Day #1 bilateral T5-6 erector spinae (ES) catheters/infusion:     continue ROpivacaine 0.2% infusion at 14 mL/hr, 7 mL/hr each catheter, plan to maintain catheters max of 7 days    expected change of On-Q device is tomorrow    patient can be evaluated to receive local anesthetic bolus Q 12 hr PRN pain control.  Bedside RN must page RAPS to request bolus    - Anticoagulation: okay to continue Heparin SQ Q 8 hrs as ordered. Please contact RAPS jobcode pager [phone] before ordering or making any medication changes    -  Follow up: RAPS will continue to follow and adjust as needed    Discussed with attending anesthesiologist     --  DEXTER [facility]  [facility]  1/19/2021 11:21 AM    FRANCIS Contact Info (24 hour job code pager is the last 4 digits) For in-house use only:   Job code ID: [facility] [id number]    [address] [address]  [employer] phone: dial * * [phone], enter jobcode ID, then enter call-back number.    Text: Use [employer] on the Intranet <Paging/Directory> tab and enter Jobcode ID.   If no call back at any time, contact the hospital  and ask for RAPS attending or backup

## 2021-01-19 NOTE — OR NURSING
Dr Behrens called for tachycardia and to see if we should titrate off the phenylephrine. Orders to turn phenylephrine drip to 0.2 and to give 250 of albumin.  1955: Dr Behrens notified of tachycardia of 130s-140s and MAP greater than 65. Ok to turn off phenylephrine.  2020: Dr Behrens notified pt still tachycardic, he would like to defer to urology on how to treat due to pt's hypotension.  2110: Dr Behrens to bedside, ok to transfer to ICU, no more interventions.

## 2021-01-19 NOTE — ANESTHESIA CARE TRANSFER NOTE
Patient: Cyrus Huertas Jr.    Procedure(s):  LYMPHADENECTOMY, RETROPERITONEUM; RESECTION OF RETROPERITONEAL MASS  LEFT RADICAL NEPHRECTOMY  Sigmoidoscopy flexible  extended left hemicolectomy. Jejunectomy    Diagnosis: 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) [C62.90, C77.2]  Diagnosis Additional Information: No value filed.    Anesthesia Type:   General     Note:    Oropharynx: oropharynx clear of all foreign objects and spontaneously breathing  Level of Consciousness: awake and drowsy  Oxygen Supplementation: nasal cannula  Level of Supplemental Oxygen: 2  Independent Airway: airway patency satisfactory and stable  Dentition: dentition unchanged  Vital Signs Stable: post-procedure vital signs reviewed and stable  Report to RN Given: handoff report given  Patient transferred to: PACU  Comments: Pt remains on phenylephrine gtt in PACU.  Surgeons aware.  Pt denies pain and is resting comfortably with eyes closed.  Report to RN with no additional questions.  Handoff Report: Identifed the Patient, Identified the Reponsible Provider, Reviewed the pertinent medical history, Discussed the surgical course, Reviewed Intra-OP anesthesia mangement and issues during anesthesia, Set expectations for post-procedure period and Allowed opportunity for questions and acknowledgement of understanding      Vitals: (Last set prior to Anesthesia Care Transfer)  CRNA VITALS  1/18/2021 1820 - 1/18/2021 1859      1/18/2021             ART BP:  105/58    Ht Rate:  126        Electronically Signed By: DEXTER Yanes CRNA  January 18, 2021  6:59 PM

## 2021-01-19 NOTE — OR NURSING
Paged urology and colorectal services with the following message: PAUL Huertas in PACU. labs and CXR sent, please review, thanks. Anisa, RN [phone] ext [phone]  Urology returned call, notified off of phenylephrine and pt is tachycardic in 140s.   2020: Spoke to urology resident, Soraya, on the phone regarding pt's tachycardia. She is going to talk to Dr Behrens and get back to me.  2035: Followed up with urology, plan is for patient to go to ICU for closer monitoring. No interventions at this time.

## 2021-01-19 NOTE — PROGRESS NOTES
01/19/21 1300   Quick Adds   Type of Visit Initial Occupational Therapy Evaluation   Living Environment   People in home other relative(s)  (uncle)   Current Living Arrangements house   Home Accessibility stairs to enter home;stairs within home   Number of Stairs, Main Entrance 2   Number of Stairs, Within Home, Primary 10   Transportation Anticipated car, drives self;family or friend will provide   Self-Care   Usual Activity Tolerance good   Current Activity Tolerance poor   Regular Exercise No   Equipment Currently Used at Home none   Disability/Function   Hearing Difficulty or Deaf no   Wear Glasses or Blind no   Concentrating, Remembering or Making Decisions Difficulty no   Difficulty Communicating no   Difficulty Eating/Swallowing no   Walking or Climbing Stairs Difficulty no   Dressing/Bathing Difficulty no   Toileting issues no   Doing Errands Independently Difficulty (such as shopping) no   Fall history within last six months no   Change in Functional Status Since Onset of Current Illness/Injury yes   General Information   Referring Physician Ishmael Villegas   Patient/Family Therapy Goal Statement (OT) return to PLOF   Additional Occupational Profile Info/Pertinent History of Current Problem Cyrus Huertas . 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 who is now POD #1 s/p exploratory laparotomy, resection of proximal jejunum and left colectomy with resection of left retroperitoneal mass and left nephrectomy; bilateral T5-6 erector spinae (ES) catheters placed on 1/18/21 for acute postoperative analgesia   Existing Precautions/Restrictions abdominal;fall   General Observations and Info pt motivated in therapy,    Cognitive Status Examination   Orientation Status orientation to person, place and time   Affect/Mental Status (Cognitive) WFL   Follows Commands WFL   Memory Deficit minimal  deficit;short-term memory   Cognitive Status Comments further testing required.    Visual Perception   Impact of Vision Impairment on Function (Vision) no concerns.    Range of Motion Comprehensive   General Range of Motion no range of motion deficits identified   Strength Comprehensive (MMT)   General Manual Muscle Testing (MMT) Assessment no strength deficits identified   Coordination   Coordination Comments no concerns.    Bed Mobility   Bed Mobility supine-sit;sit-supine   Supine-Sit [place] (Bed Mobility) modified independence;minimum assist (75% patient effort)  (education required. )   Sit-Supine [place] (Bed Mobility) modified independence;minimum assist (75% patient effort)  (education required. )   Balance   Balance Assessment standing balance: dynamic   Standing Balance: Dynamic WFL   Activities of Daily Living   BADL Assessment/Intervention lower body dressing   Lower Body Dressing Assessment/Training   [place] Level (Lower Body Dressing)   (education required. )   Instrumental Activities of Daily Living (IADL)   Previous Responsibilities meal prep;housekeeping;laundry;shopping;yardwork;medication management;finances;driving   Clinical Impression   Criteria for Skilled Therapeutic Interventions Met (OT) yes   OT Diagnosis decreased ADL I   Assessment of Occupational Performance 5 or more Performance Deficits   Identified Performance Deficits dressing, bathing, toileting, G/H, home making, work.    Planned Therapy Interventions (OT) ADL retraining;IADL retraining;bed mobility training;cognition;ROM;strengthening;transfer training;home program guidelines;progressive activity/exercise;risk factor education   Clinical Decision Making Complexity (OT) low complexity   Therapy Frequency (OT) 5x/week   Predicted Duration of Therapy 2 weeks   Risks and Benefits of Treatment have been explained. Yes   Patient, Family & other staff in agreement with plan of care Yes   Comment-Clinical Impression Pt  presents to OT with post surgical precautions, general deconditioning leading to decreased ADL I. Pt to benefit form skilled OT intervention to address the above problem list.    OT Discharge Planning    OT Discharge Recommendation (DC Rec) Home with assist   OT Rationale for DC Rec pt expected to progress well this hospital stay   OT Brief overview of current status  Pt min assist bed mobility, limited mainly by nausea and orthostatic BP drop.    Total Evaluation Time (Minutes)   Total Evaluation Time (Minutes) 5

## 2021-01-19 NOTE — PROGRESS NOTES
Admitted/transferred from: PACU  Reason for admission/transfer: Tachycardia  2 RN skin assessment: completed by Ramila GASPAR and Erin MELTON.  Result of skin assessment and interventions/actions: Medial abdominal incision, covered with island dressing, old drainage/shadowing marked, no further drainage noted. No other skin concerns  Height, weight, drug calc weight: Done  Patient belongings (see Flowsheet)  ?

## 2021-01-19 NOTE — PLAN OF CARE
PT 4A: Cancel/Hold- PT orders received. Per discussion with OT who worked with patient today, patient is moving well but limited by hypotension. Anticipate patient will only require one therapy discipline while inpatient, PT will hold evaluation at this time, OT will continue to assess mobility and notify PT to initiate if indicated.

## 2021-01-19 NOTE — PLAN OF CARE
Dx: L) retroperitoneal mass resection, L) nephrectomy, & L) colectomy resection of proximal jejunum  Shift Summary: Pt arrived to ICU ~2300 from PACU, HR Tachy 120-140's-EKG done, BP soft but not requiring vasopressor support. 2L LR bolus given for elevated lactic acid. Magnesium and Calcium replaced per protocol. Old drainage present to abdominal dressing, no new shadowing noted. R) art line positional and unable to draw back, removed per MD order.    Assessment:  Neuro: A/Ox4, following commands with equal strength in all extremities. PERRL. Pt denies numbness or tingling.  Resp: 2L NC. Clear lung sounds, pt denies SOB.  Cardiac: Sinus tach 120-140. MAP goal >65.  GI: NPO except meds and ice chips. Hypoactive bowel sounds.  : Townsend in place with adequate UO.  Skin: Abdominal incision.  Line: L) IJ MAC, PIV x2  Gtt: , Hydromorphone PCA, TKO    For full assessments and vitals, please see flowsheets.    Ramila Fernandez RN

## 2021-01-19 NOTE — H&P
SURGICAL ICU ADMISSION NOTE  01/18/2021      PRIMARY TEAM: Urology  PRIMARY PHYSICIAN: Dr. Major  REASON FOR CRITICAL CARE ADMISSION: Hemodynamic Monitoring   ADMITTING PHYSICIAN: Dr. Srinivasan  Date of Service (when I saw the patient): 01/18/2021    ASSESSMENT:  Cyrus Huertas Jr. 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 Post-operatively with tachycardia 130s-140s admitted to ICU for hemodynamic monitoring.    PLAN:    Neurological:  # Acute pain   - Monitor neurological status. Delirium preventions and precautions.   - Pain: Tylenol 650 q4h, Dilaudid PCA 0.2, 1.2 mg hour limit      Pulmonary:   - Supplemental oxygen to keep saturation above 92 %.  - Incentive spirometer every 15- 30 minutes when awake.    Cardiovascular:    #Tachycardia  - likely multi-factorial from hypovolemia 2/2 insensible losses in prolonged open abdominal case, sympathetic hyper-activity, etc  - will start with volume resuscitation - 1L LR bolus now and re-evaluate  - EKG now    # Shock-distributive now resolved  - Monitor hemodynamic status. .   - MAP goal 65  - On phenylephrine post-op, has been off for multiple hours now    Gastroenterology/Nutrition:  # s/p L colectomy, proximal jejunum resection  # Protein calorie deficit malnutrition   - NPO, okay for meds and ice chips  - Nothing per rectum  - Awaiting return of bowel function  - No indication for parenteral nutrition.    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 Will continue to monitor intake and output.  - Townsend in place    Endocrine:  # Stress Hyperglycemia  - No intervention indicated.    ID:  # Post-op leukocytosis expected   - no indications for antibiotics.   - continue to monitor for signs/sxs of infection     Positive cultures:  - N/A    Heme:     # Acute blood loss anemia   # Anemia of critical illness   -  s/p 2u prbc  - Hb 11.0 post-op => will recheck CBC  - Transfuse if hgb <7.0 or signs/symptoms of hypoperfusion. Monitor and trend.     Musculoskeletal:   # Weakness and deconditioning of critical illness   - Physical and occupational therapy consult     Skin:  # Midline incision  - management per surgical teams    General Cares/Prophylaxis:    DVT Prophylaxis: Pneumatic Compression Devices  GI Prophylaxis: Not indicated  Restraints: Restraints for medical healing needed: NO    Lines/ tubes/ drains:  - R MAC line  - Arterial line  - PIV x2  - Townsend    Disposition:  -  Surgical ICU.     Patient seen, findings and plan discussed with surgical ICU staff, Dr. Srinivasan.      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 However was still tachycardic with rates 130s-140s. Of note, he was tachycardic 115 pre-op. Intraop he had , received 2 unit(s) pRBC, IVF 6.3L.    REVIEW OF SYSTEMS: 10 point ROS neg other than the symptoms noted above in the HPI.    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)      Malnutrition (H)     during chemotherapy       SURGICAL HISTORY:   Past Surgical History:   Procedure Laterality Date     [place] [facility] [place]       SOCIAL HISTORY:   Social History     Socioeconomic History     Marital status: Single     Spouse name: None     Number of children: None     Years of education: None     Highest education level: None   Occupational History     None   Social Needs     Financial resource strain: None     Food insecurity     Worry: None     Inability: None     Transportation needs     Medical: None     Non-medical: None   Tobacco Use     Smoking status: Never Smoker     Smokeless tobacco: Never Used   Substance and Sexual Activity     Alcohol use: Not Currently     Drug use: None     Sexual activity: None   Lifestyle     Physical activity     Days per week: None     Minutes per session: None     Stress: None   Relationships     Social connections     Talks on phone: None     Gets together: None     Attends Mandaen service: None     Active member of club or organization: None     Attends meetings of clubs or organizations: None     Relationship status: None     Intimate partner violence     Fear of current or ex partner: None     Emotionally abused: None     Physically abused: None     Forced sexual activity: None   Other Topics Concern     None   Social History Narrative     None     FAMILY HISTORY: No bleeding/clotting disorders nor problems with  anesthesia.     ALLERGIES:   Allergies   Allergen Reactions     Penicillins Anaphylaxis and Rash     rash     Lactose Unknown     Uncaria [place] ([place]) Rash       MEDICATIONS:  No current facility-administered medications on file prior to encounter.        amLODIPine (NORVASC) 5 MG tablet,        dexamethasone (DECADRON) 4 MG tablet,        NO ACTIVE MEDICATIONS, .       ondansetron (ZOFRAN) 4 MG tablet,        ondansetron (ZOFRAN) 8 MG tablet,        oxyCODONE (ROXICODONE) 5 MG tablet,        polyethylene glycol (MIRALAX) 17 GM/Dose powder,        prochlorperazine (COMPAZINE) 10 MG tablet,         PHYSICAL EXAMINATION:  Temp:  [95.5  F (35.3  C)-98.3  F (36.8  C)] 97.5  F (36.4  C)  Pulse:  [] 134  Resp:  [12-19] 12  BP: ()/(64-94) 95/66  MAP:  [65 mmHg-83 mmHg] 80 mmHg  Arterial Line BP: ()/(51-70) 111/68  SpO2:  [98 %-100 %] 100 %  General: A&Ox3, NAD, drowsy  HEENT:PERRL  Neck:R MAC line in place, hemostatic   Neuro: moves all extremities, follows commands  Pulm/Resp: Clear breath sounds bilaterally without rhonchi, crackles or wheeze,  breathing non-labored  CV: tachycardic, regular rhythm  Abdomen: Soft, non-distended, appropriately tender to palpation, no rebound tenderness or guarding, no masses  :  ibarra catheter in place, urine yellow and clear  Incisions/Skin: midline incision with dressing with old blood, previous left groin incision well healed  MSK/Extremities: no peripheral edema, moving all extremities, peripheral pulses intact, calves nontender, extremities well perfused    LABS: Reviewed.   Arterial Blood Gases   Recent Labs   Lab 01/18/21  1803 01/18/21  1626 01/18/21  1551 01/18/21  1422   PH 7.37 7.31* 7.31* 7.32*   PCO2 37 40 36 41   PO2 152* 164* 168* 120*   HCO3 21 20* 18* 21     Complete Blood Count   Recent Labs   Lab 01/18/21  1920 01/18/21  1803 01/18/21  1706 01/18/21  1626   WBC 12.0*  --   --   --    HGB 11.0* 10.7* 11.2* 12.2*     --   --   --       Basic Metabolic Panel  Recent Labs   Lab 01/18/21  1920 01/18/21  1803 01/18/21  1706 01/18/21  1626 01/14/21  0829 01/14/21  0829    139 140 138   < > 139   POTASSIUM 5.0 4.5 4.6 5.5*   < > 3.9   CHLORIDE 115* 112* 112* 112*   < > 109   CO2 22  --   --   --   --  27   BUN 17  --   --   --   --  17   CR 1.32*  --   --   --   --  1.42*   * 136* 154* 160*   < > 110*    < > = values in this interval not displayed.     Liver Function Tests  Recent Labs   Lab 01/14/21  0829   AST 29   ALT 35   ALKPHOS 105   BILITOTAL 0.2   ALBUMIN 3.9     Pancreatic Enzymes  No lab results found in last 7 days.  Coagulation Profile  No lab results found in last 7 days.    IMAGING:  Recent Results (from the past 24 hour(s))   POC US Guidance Needle Placement    Impression    Bilateral ES blocks   XR Chest Port 1 View    Narrative    EXAM: XR CHEST PORT 1 VW  1/18/2021 7:20 PM     HISTORY:  please confirm CVC placement       COMPARISON:  1/14/2021    TECHNIQUE: Single frontal radiograph of the chest    FINDINGS:   Right IJ central venous catheter tip projects over the high SVC. Right  chest Port-A-Cath tip projects over the right atrium.    Midline trachea. Well-defined cardiomediastinal silhouette. Distinct  pulmonary vasculature. No consolidation, pleural effusion or  appreciable pneumothorax.      Impression    IMPRESSION:   1. Right IJ central venous catheter tip projects over the high SVC.   2. Right chest Port-A-Cath tip projects over the right atrium.   3. Lungs are clear.     I have personally reviewed the examination and initial interpretation  and I agree with the findings.    STACY MCKEE MD

## 2021-01-19 NOTE — PROGRESS NOTES
"Urology Daily Progress Note    24 hour events/Subjective:     - Hypotensive, and tachycardic, to SICU for closer monitoring post-operatively   - Central line placed    - Hyperkalemic   - No fevers, chills, chest pain, shortness of breath, nausea, vomiting   - Pain moderately controlled    O:  Vitals: BP 90/51   Pulse 123   Temp 97.8  F (36.6  C) (Oral)   Resp 9   Ht 1.702 m (5' 7.01\")   Wt 68.6 kg (151 lb 3.8 oz)   SpO2 100%   BMI 23.68 kg/m      General: Alert, interactive, in NAD  Resp: Non-labored breathing on RA  Abdomen: Soft, appropriately tender, midline incision with mildly saturated dressing, slightly distended  Ext: Warm and well perfused   Townsend: Clear yellow     I/O  UOP  1400/250    Labs    Heme:  Recent Labs   Lab 01/19/21  0443 01/18/21  2320 01/18/21  1920 01/18/21  1803   WBC 11.7* 12.5* 12.0*  --    HGB 10.1* 10.8* 11.0* 10.7*    198 243  --      Chem:  Recent Labs   Lab 01/19/21  0443 01/18/21  2320 01/18/21  1920 01/18/21  1803 01/14/21  0829 01/14/21  0829    141 142 139   < > 139   POTASSIUM 5.6* 5.7* 5.0 4.5   < > 3.9   CR 1.68* 1.38* 1.32*  --   --  1.42*    < > = values in this interval not displayed.       Assessment/Plan  25 year old male with mixed germ cell tumor and history of left radical orchiectomy, subsequent VIP now s/p left radical nephrectomy, resection of retroperitoneal mass, lymphadenectomy, left colectomy, resection of proximal jejunectomy on 1/18/21 given involvement of TIA and left colonic mesenteric involvement noted intraoperatively, transferred to SICU post-op for closer monitoring given tachycardia and hypotension    Note - plan changes for the day are in BOLD  NEURO Pain controlled on APAP, Epidural, PCA; PRN Robaxin   CV Normotensive, tachycardic, central line placed   PULM Aggressive pulmonary toilet and I/S.   FEN/GI IVF: LR @125, NS@100  Diet: NPO  Bowel regimen: None    Townsend catheter in place   HEME Hgb as above.    ID Afebrile, no " leukocytosis.    Antibiotics: Completed periop antibiotics    ENDO No issues   ACTIVITY Up as tolerated    PPx SCDs, ambulation   HOME RX ON HOLD Amlodipine, dexamethason, oxycodone, miralax, compazine, zofran   DISPO SICU, possible transfer to floor today    PT/OT recommendations: Pending     Seen and examined with chief resident, to be discussed with Dr. Major    --    Bryn Johnson MD MS  Urology Resident, PGY-2

## 2021-01-19 NOTE — BRIEF OP NOTE
[facility]     Brief Operative Note    Pre-operative diagnosis: 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) [C62.90, C77.2]  Post-operative diagnosis Same as pre-operative diagnosis    Procedure: Procedure(s):  LYMPHADENECTOMY, RETROPERITONEUM; RESECTION OF RETROPERITONEAL MASS  LEFT RADICAL NEPHRECTOMY  Sigmoidoscopy flexible  extended left hemicolectomy. Jejunectomy  Surgeon: Surgeon(s) and Role:  Panel 1:     * Frank Major MD - Primary     * Melissa Gu MD - Resident - Assisting     * Frank Araujo MD - Resident - Assisting  Panel 2:     * Ruma Angela MD - Primary  Anesthesia: Combined General with Block   Estimated blood loss: 750 cc  Drains: 16F ibarra  Specimens:   ID Type Source Tests Collected by Time Destination   A : Peritoneal Implant Tissue Peritoneum SURGICAL PATHOLOGY EXAM Frank Major MD 1/18/2021 10:30 AM    B : Left Kidney, eduard-aortic mass, descending and transverse colon  Tissue Kidney, Left SURGICAL PATHOLOGY EXAM Frank Major MD 1/18/2021  2:58 PM    C : Intra aortic caval lymph node  Tissue Lymph Node SURGICAL PATHOLOGY EXAM Frank Major MD 1/18/2021  3:24 PM    D : Left spermatic cord  Tissue Other SURGICAL PATHOLOGY EXAM Frank Major MD 1/18/2021  3:30 PM    E : Jejunum  Tissue Colon SURGICAL PATHOLOGY EXAM Frank Major MD 1/18/2021  4:06 PM    F : Para Caval Lymph Node Tissue Lymph Node SURGICAL PATHOLOGY EXAM Frank Major MD 1/18/2021  5:45 PM    G : intra aortic caval Lymph Node #2 Tissue Lymph Node SURGICAL PATHOLOGY EXAM Frank Major MD 1/18/2021  5:47 PM      Findings:   See op note.  Complications: None.  Implants: * No implants in log *

## 2021-01-19 NOTE — PROGRESS NOTES
"Colorectal Surgery Progress Note     POD 1 s/p resection of proximal jejunum and L colectomy with resection of L retroperitoneal mass and L nephrectomy     Subjective:   He slept well last night. He thinks he has had flatus overnight. No BM. Pain controlled at 5/10 with good relief from PCA. Sleepy after therapy     Physical Exam:  /54   Pulse 126   Temp 97.4  F (36.3  C) (Oral)   Resp 12   Ht 1.702 m (5' 7.01\")   Wt 68.6 kg (151 lb 3.8 oz)   SpO2 100%   BMI 23.68 kg/m      Afebrile, VSS and WNL  Fluent and articulate speech  Grossly alert and oriented  Breathing comfortably on room air  Abdomen is firm, non-distended, appropriate tenderness to palpation. Mid abdominal incision clean and dry with dressing intact.   No lower extremity edema, warm and well perfused    ASSESSMENT/PLAN:      Cyrus Huertas Jr. 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 He is progressing well.     -Continue NPO today. Will consider CLD tomorrow.  -Pain control as needed  -OOB and continue working with therapies  -Primary cares per SICU and urology       Farida Mena  General Surgery Resident, PGY-1  "

## 2021-01-20 ENCOUNTER — APPOINTMENT (OUTPATIENT)
Dept: OCCUPATIONAL THERAPY | Facility: CLINIC | Age: 26
End: 2021-01-20
Attending: UROLOGY
Payer: COMMERCIAL

## 2021-01-20 LAB
ANION GAP SERPL CALCULATED.3IONS-SCNC: 5 MMOL/L (ref 3–14)
BUN SERPL-MCNC: 22 MG/DL (ref 7–30)
CALCIUM SERPL-MCNC: 7.9 MG/DL (ref 8.5–10.1)
CHLORIDE SERPL-SCNC: 109 MMOL/L (ref 94–109)
CO2 SERPL-SCNC: 24 MMOL/L (ref 20–32)
CREAT SERPL-MCNC: 1.72 MG/DL (ref 0.66–1.25)
ERYTHROCYTE [DISTWIDTH] IN BLOOD BY AUTOMATED COUNT: 17.4 % (ref 10–15)
ERYTHROCYTE [DISTWIDTH] IN BLOOD BY AUTOMATED COUNT: 17.5 % (ref 10–15)
GFR SERPL CREATININE-BSD FRML MDRD: 53 ML/MIN/{1.73_M2}
GLUCOSE SERPL-MCNC: 116 MG/DL (ref 70–99)
HCT VFR BLD AUTO: 24.2 % (ref 40–53)
HCT VFR BLD AUTO: 24.2 % (ref 40–53)
HGB BLD-MCNC: 7.6 G/DL (ref 13.3–17.7)
HGB BLD-MCNC: 7.8 G/DL (ref 13.3–17.7)
MCH RBC QN AUTO: 30 PG (ref 26.5–33)
MCH RBC QN AUTO: 31 PG (ref 26.5–33)
MCHC RBC AUTO-ENTMCNC: 31.4 G/DL (ref 31.5–36.5)
MCHC RBC AUTO-ENTMCNC: 32.2 G/DL (ref 31.5–36.5)
MCV RBC AUTO: 96 FL (ref 78–100)
MCV RBC AUTO: 96 FL (ref 78–100)
PLATELET # BLD AUTO: 136 10E9/L (ref 150–450)
PLATELET # BLD AUTO: 143 10E9/L (ref 150–450)
POTASSIUM SERPL-SCNC: 4.4 MMOL/L (ref 3.4–5.3)
RBC # BLD AUTO: 2.52 10E12/L (ref 4.4–5.9)
RBC # BLD AUTO: 2.53 10E12/L (ref 4.4–5.9)
SODIUM SERPL-SCNC: 137 MMOL/L (ref 133–144)
WBC # BLD AUTO: 11.1 10E9/L (ref 4–11)
WBC # BLD AUTO: 11.8 10E9/L (ref 4–11)

## 2021-01-20 PROCEDURE — 97535 SELF CARE MNGMENT TRAINING: CPT | Mod: GO

## 2021-01-20 PROCEDURE — 97530 THERAPEUTIC ACTIVITIES: CPT | Mod: GO

## 2021-01-20 PROCEDURE — 120N000002 HC R&B MED SURG/OB UMMC

## 2021-01-20 PROCEDURE — 85027 COMPLETE CBC AUTOMATED: CPT | Performed by: UROLOGY

## 2021-01-20 PROCEDURE — 258N000001 HC RX 258: Performed by: PHYSICIAN ASSISTANT

## 2021-01-20 PROCEDURE — 85027 COMPLETE CBC AUTOMATED: CPT | Performed by: STUDENT IN AN ORGANIZED HEALTH CARE EDUCATION/TRAINING PROGRAM

## 2021-01-20 PROCEDURE — 250N000009 HC RX 250: Performed by: NURSE PRACTITIONER

## 2021-01-20 PROCEDURE — 36415 COLL VENOUS BLD VENIPUNCTURE: CPT | Performed by: STUDENT IN AN ORGANIZED HEALTH CARE EDUCATION/TRAINING PROGRAM

## 2021-01-20 PROCEDURE — 250N000011 HC RX IP 250 OP 636: Performed by: STUDENT IN AN ORGANIZED HEALTH CARE EDUCATION/TRAINING PROGRAM

## 2021-01-20 PROCEDURE — 250N000013 HC RX MED GY IP 250 OP 250 PS 637: Performed by: STUDENT IN AN ORGANIZED HEALTH CARE EDUCATION/TRAINING PROGRAM

## 2021-01-20 PROCEDURE — 250N000011 HC RX IP 250 OP 636: Performed by: PHYSICIAN ASSISTANT

## 2021-01-20 PROCEDURE — 36415 COLL VENOUS BLD VENIPUNCTURE: CPT | Performed by: UROLOGY

## 2021-01-20 PROCEDURE — 80048 BASIC METABOLIC PNL TOTAL CA: CPT | Performed by: STUDENT IN AN ORGANIZED HEALTH CARE EDUCATION/TRAINING PROGRAM

## 2021-01-20 RX ORDER — BUPIVACAINE HYDROCHLORIDE 5 MG/ML
5 INJECTION, SOLUTION EPIDURAL; INTRACAUDAL ONCE
Status: COMPLETED | OUTPATIENT
Start: 2021-01-20 | End: 2021-01-20

## 2021-01-20 RX ORDER — NALOXONE HYDROCHLORIDE 0.4 MG/ML
0.2 INJECTION, SOLUTION INTRAMUSCULAR; INTRAVENOUS; SUBCUTANEOUS
Status: DISCONTINUED | OUTPATIENT
Start: 2021-01-20 | End: 2021-01-28 | Stop reason: HOSPADM

## 2021-01-20 RX ORDER — NALOXONE HYDROCHLORIDE 0.4 MG/ML
0.4 INJECTION, SOLUTION INTRAMUSCULAR; INTRAVENOUS; SUBCUTANEOUS
Status: DISCONTINUED | OUTPATIENT
Start: 2021-01-20 | End: 2021-01-28 | Stop reason: HOSPADM

## 2021-01-20 RX ADMIN — PROCHLORPERAZINE EDISYLATE 10 MG: 5 INJECTION INTRAMUSCULAR; INTRAVENOUS at 17:52

## 2021-01-20 RX ADMIN — HYDROMORPHONE HYDROCHLORIDE 0.2 MG: 0.2 INJECTION, SOLUTION INTRAMUSCULAR; INTRAVENOUS; SUBCUTANEOUS at 23:04

## 2021-01-20 RX ADMIN — HYDROMORPHONE HYDROCHLORIDE 0.4 MG: 0.2 INJECTION, SOLUTION INTRAMUSCULAR; INTRAVENOUS; SUBCUTANEOUS at 15:33

## 2021-01-20 RX ADMIN — DEXTROSE MONOHYDRATE AND SODIUM CHLORIDE: 5; .45 INJECTION, SOLUTION INTRAVENOUS at 01:58

## 2021-01-20 RX ADMIN — METHOCARBAMOL 750 MG: 750 TABLET, FILM COATED ORAL at 07:41

## 2021-01-20 RX ADMIN — ACETAMINOPHEN 650 MG: 325 TABLET, FILM COATED ORAL at 13:26

## 2021-01-20 RX ADMIN — HYDROMORPHONE HYDROCHLORIDE 0.2 MG: 0.2 INJECTION, SOLUTION INTRAMUSCULAR; INTRAVENOUS; SUBCUTANEOUS at 18:16

## 2021-01-20 RX ADMIN — ACETAMINOPHEN 650 MG: 325 TABLET, FILM COATED ORAL at 04:06

## 2021-01-20 RX ADMIN — BUPIVACAINE HYDROCHLORIDE 25 MG: 5 INJECTION, SOLUTION EPIDURAL; INTRACAUDAL at 08:20

## 2021-01-20 RX ADMIN — HEPARIN SODIUM 5000 UNITS: 5000 INJECTION, SOLUTION INTRAVENOUS; SUBCUTANEOUS at 00:22

## 2021-01-20 RX ADMIN — HEPARIN SODIUM 5000 UNITS: 5000 INJECTION, SOLUTION INTRAVENOUS; SUBCUTANEOUS at 17:59

## 2021-01-20 RX ADMIN — ACETAMINOPHEN 650 MG: 325 TABLET, FILM COATED ORAL at 18:02

## 2021-01-20 RX ADMIN — ACETAMINOPHEN 650 MG: 325 TABLET, FILM COATED ORAL at 00:22

## 2021-01-20 RX ADMIN — DEXTROSE MONOHYDRATE AND SODIUM CHLORIDE: 5; .45 INJECTION, SOLUTION INTRAVENOUS at 18:16

## 2021-01-20 RX ADMIN — OXYCODONE HYDROCHLORIDE 10 MG: 5 TABLET ORAL at 07:41

## 2021-01-20 RX ADMIN — ONDANSETRON 4 MG: 2 INJECTION INTRAMUSCULAR; INTRAVENOUS at 15:34

## 2021-01-20 RX ADMIN — ACETAMINOPHEN 650 MG: 325 TABLET, FILM COATED ORAL at 07:41

## 2021-01-20 RX ADMIN — HEPARIN SODIUM 5000 UNITS: 5000 INJECTION, SOLUTION INTRAVENOUS; SUBCUTANEOUS at 09:12

## 2021-01-20 ASSESSMENT — ACTIVITIES OF DAILY LIVING (ADL)
ADLS_ACUITY_SCORE: 16

## 2021-01-20 ASSESSMENT — MIFFLIN-ST. JEOR: SCORE: 1676.29

## 2021-01-20 NOTE — SUMMARY OF CARE
ADDING ON TO PREVIOUS SUMMARY OF CARE:  Brown jacket, grey shirt, green pants, brown boots, red and grey socks inside the boots, chocolates in pockets, and light brown hat.

## 2021-01-20 NOTE — PROGRESS NOTES
[facility] Nerve Block Daily Progress Note  01/20/21      Cyrus Huertas Jr. 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 who is now POD #2 s/p exploratory laparotomy, resection of proximal jejunum and left colectomy with resection of left retroperitoneal mass and left nephrectomy; bilateral T5-6 erector spinae (ES) catheters placed on 1/18/21 for acute postoperative analgesia    Subjective    24 hour Interval History  - No acute events overnight. Nausea and emesis this morning, resolved after antiemetic.    - Patient reports abdominal pain 5/10 and good relief with oral/IV analgesics.  States he desires to minimize opioid side effects and therefore is willing to have local anesthetic bolus today. Tolerating with nerve block infusion. Denies weakness, paresthesias, circumoral numbness, metallic taste, tinnitus.  NPO, Townsend catheter in place, has been OOB and ambulated with standby assist    Antithrombotic or Thrombolytic Therapy ordered:   heparin ANTICOAGULANT injection 5,000 Units, SC, Q8H         Analgesic Medications ordered:  Medications related to Pain Management (From now, onward)    Start     Dose/Rate Route Frequency Ordered Stop    01/19/21 1430  oxyCODONE (ROXICODONE) tablet 5-10 mg      5-10 mg Oral or Feeding Tube EVERY 4 HOURS PRN 01/19/21 1429      01/19/21 1159  HYDROmorphone (DILAUDID) injection 0.2-0.4 mg      0.2-0.4 mg Intravenous EVERY 2 HOURS PRN 01/19/21 1159      01/19/21 0316  methocarbamol (ROBAXIN) tablet 750 mg      750 mg Oral 4 TIMES DAILY PRN 01/19/21 0316      01/18/21 2330  acetaminophen (TYLENOL) tablet 650 mg      650 mg Oral EVERY 4 HOURS 01/18/21 2309      01/18/21 0930  ropivacaine 0.2% (NAROPIN) 750 mL in ON-Q C-Bloc select flow ([id number] holds 600-750 mL) dual cath disposable pump      14 mL/hr  Irrigation CONTINUOUS 01/18/21 0907       "        Objective  Exam  Vitals:   /71 (BP Location: Right arm)   Pulse 116   Temp 98.8  F (37.1  C) (Oral)   Resp 20   Ht 1.702 m (5' 7.01\")   Wt 68.6 kg (151 lb 3.8 oz)   SpO2 100%   BMI 23.68 kg/m        General: Alert, oriented, NAD  MSK/Neuro: Full Strength BLE 5/5 and overall symmetric.    Skin: bilateral erector spinae (ES) catheter sites with dressings c/d/i, no tenderness, erythema, heme, edema       Labs/Diagnostics  Heme/INR:  Recent Labs   Lab Test 01/20/21  0717 01/20/21  0435   WBC 11.1* 11.8*   RBC 2.53* 2.52*   HGB 7.6* 7.8*   HCT 24.2* 24.2*   MCV 96 96   MCH 30.0 31.0   MCHC 31.4* 32.2   RDW 17.4* 17.5*   * 143*       No results found for: INR       Assessment/Plan  ASSESSMENT  POD #2 s/p exploratory laparotomy, resection of proximal jejunum and left colectomy with resection of left retroperitoneal mass and left nephrectomy; bilateral T5-6 erector spinae (ES) catheters placed on 1/18/21 for acute postoperative analgesia    Pain moderately controlled, tolerating local anesthetic nerve block infusion, nausea this AM.  Motor function intact and no evidence of adverse side effects related to local anesthetic continuous infusion. Patient is meeting activity goals.  Will likely benefit from local anesthetic bolus Q 12 hrs.      0820 Clinician Local Anesthetic Bolus  VSS  - MEDICATION: PF bupivacaine 0.25% 5 mL via each nerve block catheter, Total amount given 10 mL  - PROCEDURE: Clinician bolus administered incrementally; negative aspirate.  No symptoms of local anesthetic systemic toxicity (LAST). Remained with and assessed patient for 10 min post-injection. BP, P and MAP stable  - POST-PROCEDURE: Bedside RN aware of need to continue BP, P and MAP monitoring Q 10 min for an additional 20 min. Contact RAPS (jobcode ID [id number]) if experiencing any untoward effects or MAP less than 60       PLAN  1300 Follow up: Patient reports a lot of improvement in pain following bolus, currently " rating pain at 4/10  - Catheter Day #2 bilateral T5-6 erector spinae (ES) catheters/infusion:     continue ROpivacaine 0.2% infusion at 14 mL/hr, 7 mL/hr each catheter, plan to maintain catheters max of 7 days    expected change of On-Q device is today - bedside RN aware    patient can be evaluated to receive local anesthetic bolus Q 12 hr PRN pain control.  Bedside RN must page RAPS to request bolus    - Anticoagulation: okay to continue Heparin SQ Q 8 hrs as ordered. Please contact RAPS jobcode pager [phone] before ordering or making any medication changes    -  Follow up: RAPS will continue to follow and adjust as needed    Discussed with attending anesthesiologist     --  DEXTER [facility]  [facility]  1/20/2021 10:48 AM    RAPS Contact Info (24 hour job code pager is the last 4 digits) For in-house use only:   Job code ID: [id number]   [place] 0599  Peds [phone]  [employer] phone: dial * [phone] * [phone], enter jobcode ID, then enter call-back number.    Text: Use [place] on the Intranet <Paging/Directory> tab and enter Jobcode ID.   If no call back at any time, contact the hospital  and ask for RAPS attending or backup

## 2021-01-20 NOTE — PROGRESS NOTES
"Urology Daily Progress Note    24 hour events/Subjective:     - Transferred to floor status yesterday, physically in ICU overnight   - AF, remains tachycardic (110s-120s) but normotensive and on RA   - No fevers, chills, chest pain, shortness of breath, nausea, vomiting   - Endorses flatus   - Pain controlled    O:  Vitals: /78   Pulse 129   Temp 98.2  F (36.8  C) (Oral)   Resp 16   Ht 1.702 m (5' 7.01\")   Wt 68.6 kg (151 lb 3.8 oz)   SpO2 99%   BMI 23.68 kg/m      General: Alert, interactive, in NAD  Resp: Non-labored breathing on RA  Abdomen: Soft, appropriately tender, midline incision CDI with staples, mildly distended appropriately tender  Ext: Warm and well perfused   Townsend: Clear yellow     I/O  UOP  1130/100    Labs    Heme:  Recent Labs   Lab 01/20/21  0435 01/19/21  0443 01/18/21  2320 01/18/21  1920   WBC 11.8* 11.7* 12.5* 12.0*   HGB 7.8* 10.1* 10.8* 11.0*   * 186 198 243     Chem:  Recent Labs   Lab 01/20/21  0435 01/19/21  1144 01/19/21  0443 01/18/21  2320    141 142 141   POTASSIUM 4.4 5.1 5.6* 5.7*   CR 1.72* 1.88* 1.68* 1.38*       Assessment/Plan  25 year old male with mixed germ cell tumor and history of left radical orchiectomy, subsequent VIP now s/p left radical nephrectomy, resection of retroperitoneal mass, lymphadenectomy, left colectomy, resection of proximal jejunectomy on 1/18/21 given involvement of TIA and left colonic mesenteric involvement noted intraoperatively, vital signs normalizing with improved tachycardia    Note - plan changes for the day are in BOLD  NEURO Pain controlled on APAP, Epidural, PCA; PRN Robaxin   CV Normotensive, tachycardic, central line placed   PULM Aggressive pulmonary toilet and I/S.   FEN/GI IVF: D5 1/2 NS @125  Diet: NPO -  Will alert CR team to flatus  Bowel regimen: None    Townsend catheter in place, trial of void today   HEME Hgb as above. Re-check planned, expect this is a result of equilibration as well as appropriate " re-hydration, low suspicion for active bleeding   ID Afebrile, no leukocytosis.    Antibiotics: Completed periop antibiotics    ENDO No issues   ACTIVITY Up as tolerated    PPx SCDs, ambulation, Hold SQH pending Hgb recheck   HOME RX ON HOLD Amlodipine, dexamethason, oxycodone, miralax, compazine, zofran   DISPO Floor (transfer pending bed availability)    PT/OT recommendations: Pending     Seen and examined with chief resident, to be discussed with Dr. Major    --    Soraya Grant MD  URology Resident

## 2021-01-20 NOTE — SUMMARY OF CARE
Blue suitcase with yellow striped wheels, brown jacket, mouthwash, negin burners with rubber band. Grey shorts, green shorts, mushroom vitamins immune support, black , white  head, and black earphones. 2 pairs of grey socks, hand , purple toothbrush, mint toothwash, and black Nokia phone.

## 2021-01-20 NOTE — PLAN OF CARE
PT4A: DEFER- per discussion with OT, pt up SBA and ambulating well post op. No balance concerns. OT will continue to follow for ADL training and general conditioning as well as abd precaution training. No acute skilled PT needs to support safe discharge home. PT will complete orders, OT will continue to follow as pt mobilizing well and only needing x1 discipline.

## 2021-01-20 NOTE — PROGRESS NOTES
"CLINICAL NUTRITION SERVICES - ASSESSMENT NOTE     Nutrition Prescription    RECOMMENDATIONS FOR MDs/PROVIDERS TO ORDER:  Continue to encourage low fat diet.    Malnutrition Status:    None    Recommendations already ordered by Registered Dietitian (RD):  None    Future/Additional Recommendations:  Monitor diet advancement and PO intake.     REASON FOR ASSESSMENT  Cyrus Huertas Jr. is a/an 25 year old male assessed by the dietitian for Provider Order - Will need low fat (<30g) diet for 30 days postop (to reduce risk of lymphatic leak)    NUTRITION HISTORY  PMH significant for cancer.     CURRENT NUTRITION ORDERS  Diet: Clear Liquid  Intake/Tolerance:   - emesis 1/19  - +flatus, -BM, feels hungry    LABS  Labs reviewed  - Cr: 1.88 -> 1.72    MEDICATIONS  Medications reviewed  -D5 @ 125 ml/hr    ANTHROPOMETRICS  Height: 170.2 cm (5' 7.008\")  Most Recent Weight: 68.6 kg (151 lb 3.8 oz)    IBW: 68 kg  BMI: Normal BMI  Weight History:   Wt Readings from Last 10 Encounters:   01/18/21 68.6 kg (151 lb 3.8 oz)   01/06/21 67.1 kg (148 lb)   03/19/09 59.4 kg (131 lb) (47 %, Z= -0.08)*   01/03/08 58.1 kg (128 lb) (63 %, Z= 0.34)*   01/05/06 43 kg (94 lb 14.4 oz) (47 %, Z= -0.08)*   11/29/05 42.2 kg (93 lb) (45 %, Z= -0.13)*   10/30/05 39.9 kg (88 lb) (36 %, Z= -0.36)*     * Growth percentiles are based on CDC (Boys, 2-20 Years) data.     Per Care Everywhere,  72.6 kg (160 lb) 02/14/2020 8:41 AM CST     65.5 kg (144 lb 8 oz) 01/13/2021 9:10 AM CST     Dosing Weight: 69 kg    ASSESSED NUTRITION NEEDS  Estimated Energy Needs: 3877-7362 kcals/day (25 - 30 kcals/kg)  Justification: Increased needs  Estimated Protein Needs:  grams protein/day (1.2 - 1.5 grams of pro/kg)  Justification: Increased needs  Estimated Fluid Needs: 1 ml/kcal  Justification: Maintenance    PHYSICAL FINDINGS  See malnutrition section below.  None    MALNUTRITION  % Intake: Decreased intake does not meet criteria  % Weight Loss: None " noted  Subcutaneous Fat Loss: None observed  Muscle Loss: None observed  Fluid Accumulation/Edema: None noted  Malnutrition Diagnosis: Patient does not meet two of the established criteria necessary for diagnosing malnutrition    NUTRITION DIAGNOSIS  Inadequate oral intake related to clear liquid diet 2/2 GI surgery as evidenced by patient NPO x 3 days.      INTERVENTIONS  Implementation  Nutrition Education: Provided education on role of RD and a Low Fat Diet     Goals  Diet adv v nutrition support within 2-3 days.     Monitoring/Evaluation  Progress toward goals will be monitored and evaluated per protocol.    Lena Faye  Dietetic Intern    Soraya Yanez, RD, MS, LD

## 2021-01-20 NOTE — PLAN OF CARE
Dx: Lymphadenectomy, L) radical nephrectomy, resection of L) retroperitineal mass,& L) colectomy resection of proximal jejunum  Shift Summary: No acute changes. Scheduled and PRN pain medications given (see MAR).     Assessment:  Neuro: A/Ox4, following commands with equal strength in all extremiteis. PERRL. Pt denies numbness or tingling.  Resp: RA  Cardiac: Sinus tachy 100-130's. BP WDL.  GI: NPO except meds and ice chips, hypoactive bowel sounds  : Townsend remains in place with adequate UO.  Skin: No new skin concerns. Abdominal incision remains WDL.  Line: PIVx2  Gtt: D5 1/2 NS    For full assessments and vitals please see flowsheets.  Ramila Fernandez RN

## 2021-01-20 NOTE — PROGRESS NOTES
"Colorectal Surgery Progress Note     POD 2 s/p resection of proximal jejunum and L colectomy with resection of L retroperitoneal mass and L nephrectomy     Subjective:   He slept well last night. Emesis x 1 yesterday. No nausea this morning. Positive flatus. No BM. Pain moderately controlled at 6/10. Feels hungry. Ambulated in his room yesterday.    Physical Exam:  /80 (BP Location: Right arm)   Pulse 133   Temp 97.6  F (36.4  C) (Oral)   Resp 19   Ht 1.702 m (5' 7.01\")   Wt 68.6 kg (151 lb 3.8 oz)   SpO2 99%   BMI 23.68 kg/m      Afebrile, VSS and WNL  Fluent and articulate speech  Grossly alert and oriented  Breathing comfortably on room air  Abdomen is firm, non-distended, appropriate tenderness to palpation. Mid abdominal incision clean and dry with staples intact.   No lower extremity edema, warm and well perfused    ASSESSMENT/PLAN:      Cyrus Huertas Jr. 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 He is progressing well. Creatinine down trending.     -Ok to advance to clear liquid diet  -Recheck Hgb per urology. Significant drop this morning.   -Pain control PRN  -OOB and continue working with therapies  -Primary cares per urology       Farida Mena  General Surgery Resident, PGY-1  "

## 2021-01-20 NOTE — PLAN OF CARE
"Transfer  Transferred to: 5B  Via: WC  Reason for transfer:Pt no longer appropriate for 4A- improved patient condition  Family: Aware of transfer  Belongings: Packed and sent with pt  Chart: Delivered with pt to next unit  Medications: Meds sent to new unit with pt  Report given to: BOBBI Albarado  Pt status: /85   Pulse 129   Temp 97.6  F (36.4  C) (Oral)   Resp 20   Ht 1.702 m (5' 7.01\")   Wt 68.6 kg (151 lb 3.8 oz)   SpO2 97%   BMI 23.68 kg/m        "

## 2021-01-20 NOTE — PROGRESS NOTES
Patient arrived from  via wheelchair escorted by 4A RN. Patient settled to room, oriented to room and call light . . 2 RN skin assessment completed  by Per . Luisa MANZO Abdominal medial incision, staple intact .has ropivacaine drip @ 14 ml/hr .   Pt belonging in room see (flow sheet  ). Continue carry on as order put in and update MD with change.

## 2021-01-21 ENCOUNTER — APPOINTMENT (OUTPATIENT)
Dept: GENERAL RADIOLOGY | Facility: CLINIC | Age: 26
End: 2021-01-21
Attending: UROLOGY
Payer: COMMERCIAL

## 2021-01-21 LAB
ANION GAP SERPL CALCULATED.3IONS-SCNC: 7 MMOL/L (ref 3–14)
BUN SERPL-MCNC: 17 MG/DL (ref 7–30)
CALCIUM SERPL-MCNC: 8.1 MG/DL (ref 8.5–10.1)
CHLORIDE SERPL-SCNC: 108 MMOL/L (ref 94–109)
CO2 SERPL-SCNC: 23 MMOL/L (ref 20–32)
CREAT SERPL-MCNC: 1.59 MG/DL (ref 0.66–1.25)
ERYTHROCYTE [DISTWIDTH] IN BLOOD BY AUTOMATED COUNT: 17 % (ref 10–15)
GFR SERPL CREATININE-BSD FRML MDRD: 58 ML/MIN/{1.73_M2}
GLUCOSE SERPL-MCNC: 109 MG/DL (ref 70–99)
HCT VFR BLD AUTO: 22.3 % (ref 40–53)
HGB BLD-MCNC: 7 G/DL (ref 13.3–17.7)
HGB BLD-MCNC: 7.1 G/DL (ref 13.3–17.7)
MCH RBC QN AUTO: 30.7 PG (ref 26.5–33)
MCHC RBC AUTO-ENTMCNC: 31.4 G/DL (ref 31.5–36.5)
MCV RBC AUTO: 98 FL (ref 78–100)
PLATELET # BLD AUTO: 146 10E9/L (ref 150–450)
POTASSIUM SERPL-SCNC: 3.9 MMOL/L (ref 3.4–5.3)
RBC # BLD AUTO: 2.28 10E12/L (ref 4.4–5.9)
SODIUM SERPL-SCNC: 137 MMOL/L (ref 133–144)
WBC # BLD AUTO: 10.7 10E9/L (ref 4–11)

## 2021-01-21 PROCEDURE — 85018 HEMOGLOBIN: CPT | Performed by: PHYSICIAN ASSISTANT

## 2021-01-21 PROCEDURE — 258N000001 HC RX 258: Performed by: STUDENT IN AN ORGANIZED HEALTH CARE EDUCATION/TRAINING PROGRAM

## 2021-01-21 PROCEDURE — 258N000001 HC RX 258: Performed by: PHYSICIAN ASSISTANT

## 2021-01-21 PROCEDURE — 271N000002 HC RX 271: Performed by: STUDENT IN AN ORGANIZED HEALTH CARE EDUCATION/TRAINING PROGRAM

## 2021-01-21 PROCEDURE — 74018 RADEX ABDOMEN 1 VIEW: CPT

## 2021-01-21 PROCEDURE — 250N000013 HC RX MED GY IP 250 OP 250 PS 637: Performed by: STUDENT IN AN ORGANIZED HEALTH CARE EDUCATION/TRAINING PROGRAM

## 2021-01-21 PROCEDURE — 250N000009 HC RX 250: Performed by: NURSE PRACTITIONER

## 2021-01-21 PROCEDURE — 36415 COLL VENOUS BLD VENIPUNCTURE: CPT | Performed by: PHYSICIAN ASSISTANT

## 2021-01-21 PROCEDURE — 250N000011 HC RX IP 250 OP 636: Performed by: PHYSICIAN ASSISTANT

## 2021-01-21 PROCEDURE — 120N000002 HC R&B MED SURG/OB UMMC

## 2021-01-21 PROCEDURE — 36415 COLL VENOUS BLD VENIPUNCTURE: CPT | Performed by: STUDENT IN AN ORGANIZED HEALTH CARE EDUCATION/TRAINING PROGRAM

## 2021-01-21 PROCEDURE — 80048 BASIC METABOLIC PNL TOTAL CA: CPT | Performed by: STUDENT IN AN ORGANIZED HEALTH CARE EDUCATION/TRAINING PROGRAM

## 2021-01-21 PROCEDURE — 250N000011 HC RX IP 250 OP 636: Performed by: STUDENT IN AN ORGANIZED HEALTH CARE EDUCATION/TRAINING PROGRAM

## 2021-01-21 PROCEDURE — 85027 COMPLETE CBC AUTOMATED: CPT | Performed by: STUDENT IN AN ORGANIZED HEALTH CARE EDUCATION/TRAINING PROGRAM

## 2021-01-21 PROCEDURE — 74018 RADEX ABDOMEN 1 VIEW: CPT | Mod: 26 | Performed by: RADIOLOGY

## 2021-01-21 PROCEDURE — 999N000128 HC STATISTIC PERIPHERAL IV START W/O US GUIDANCE

## 2021-01-21 PROCEDURE — 250N000009 HC RX 250: Performed by: STUDENT IN AN ORGANIZED HEALTH CARE EDUCATION/TRAINING PROGRAM

## 2021-01-21 RX ORDER — FUROSEMIDE 10 MG/ML
10 INJECTION INTRAMUSCULAR; INTRAVENOUS ONCE
Status: COMPLETED | OUTPATIENT
Start: 2021-01-21 | End: 2021-01-21

## 2021-01-21 RX ORDER — LIDOCAINE 4 G/G
2 PATCH TOPICAL
Status: DISCONTINUED | OUTPATIENT
Start: 2021-01-21 | End: 2021-01-28 | Stop reason: HOSPADM

## 2021-01-21 RX ORDER — ACETAMINOPHEN 325 MG/1
650 TABLET ORAL EVERY 4 HOURS PRN
Status: DISCONTINUED | OUTPATIENT
Start: 2021-01-21 | End: 2021-01-25

## 2021-01-21 RX ORDER — BUPIVACAINE HYDROCHLORIDE 5 MG/ML
5 INJECTION, SOLUTION EPIDURAL; INTRACAUDAL ONCE
Status: COMPLETED | OUTPATIENT
Start: 2021-01-21 | End: 2021-01-21

## 2021-01-21 RX ORDER — HEPARIN SODIUM 5000 [USP'U]/.5ML
5000 INJECTION, SOLUTION INTRAVENOUS; SUBCUTANEOUS EVERY 8 HOURS
Status: DISCONTINUED | OUTPATIENT
Start: 2021-01-21 | End: 2021-01-28

## 2021-01-21 RX ADMIN — HEPARIN SODIUM 5000 UNITS: 5000 INJECTION, SOLUTION INTRAVENOUS; SUBCUTANEOUS at 00:23

## 2021-01-21 RX ADMIN — METHOCARBAMOL 750 MG: 750 TABLET, FILM COATED ORAL at 20:20

## 2021-01-21 RX ADMIN — DEXTROSE MONOHYDRATE AND SODIUM CHLORIDE: 5; .45 INJECTION, SOLUTION INTRAVENOUS at 09:49

## 2021-01-21 RX ADMIN — DEXTROSE MONOHYDRATE AND SODIUM CHLORIDE: 5; .45 INJECTION, SOLUTION INTRAVENOUS at 02:15

## 2021-01-21 RX ADMIN — HYDROMORPHONE HYDROCHLORIDE 0.4 MG: 0.2 INJECTION, SOLUTION INTRAMUSCULAR; INTRAVENOUS; SUBCUTANEOUS at 10:02

## 2021-01-21 RX ADMIN — OXYCODONE HYDROCHLORIDE 10 MG: 5 TABLET ORAL at 18:08

## 2021-01-21 RX ADMIN — Medication: at 02:19

## 2021-01-21 RX ADMIN — HEPARIN SODIUM 5000 UNITS: 5000 INJECTION, SOLUTION INTRAVENOUS; SUBCUTANEOUS at 18:10

## 2021-01-21 RX ADMIN — ACETAMINOPHEN 650 MG: 325 TABLET, FILM COATED ORAL at 04:50

## 2021-01-21 RX ADMIN — ACETAMINOPHEN 650 MG: 325 TABLET, FILM COATED ORAL at 20:20

## 2021-01-21 RX ADMIN — OXYCODONE HYDROCHLORIDE 10 MG: 5 TABLET ORAL at 07:48

## 2021-01-21 RX ADMIN — BUPIVACAINE HYDROCHLORIDE 25 MG: 5 INJECTION, SOLUTION EPIDURAL; INTRACAUDAL at 09:23

## 2021-01-21 RX ADMIN — DEXTROSE MONOHYDRATE AND SODIUM CHLORIDE: 5; .45 INJECTION, SOLUTION INTRAVENOUS at 20:19

## 2021-01-21 RX ADMIN — HEPARIN SODIUM 5000 UNITS: 5000 INJECTION, SOLUTION INTRAVENOUS; SUBCUTANEOUS at 12:39

## 2021-01-21 RX ADMIN — HYDROMORPHONE HYDROCHLORIDE 0.2 MG: 0.2 INJECTION, SOLUTION INTRAMUSCULAR; INTRAVENOUS; SUBCUTANEOUS at 01:39

## 2021-01-21 RX ADMIN — FUROSEMIDE 10 MG: 10 INJECTION, SOLUTION INTRAVENOUS at 09:50

## 2021-01-21 RX ADMIN — HYDROMORPHONE HYDROCHLORIDE 0.2 MG: 0.2 INJECTION, SOLUTION INTRAMUSCULAR; INTRAVENOUS; SUBCUTANEOUS at 04:50

## 2021-01-21 RX ADMIN — ACETAMINOPHEN 650 MG: 325 TABLET, FILM COATED ORAL at 00:23

## 2021-01-21 ASSESSMENT — ACTIVITIES OF DAILY LIVING (ADL)
ADLS_ACUITY_SCORE: 16

## 2021-01-21 NOTE — PLAN OF CARE
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 Calls approprietly.   Pain: Complains of pain @ insision site- PRN dilaudid given.   Neuro: A&O x4.   Cardiac: Pt on tele. Sinus tach. Denies chest pain.   Respiratory: Pt on RA. Lung sound clear. Denies SOB  GI/: No BM overnight. Voiding using bedside urinal.   Diet: NPO due to nausea. Did not complain of nausea overnight.   Lines: PIV- infusing IVMF.   Wounds: Abd incision.   Labs/Imaging:    New changes this shift: Pt complaining of pain- PRN dilaudid given. Pt voided x1 overnight. No BM. Slept well inbetween cares.     Plan:     Continue to monitor and follow POC     "

## 2021-01-21 NOTE — PLAN OF CARE
A:  patient up to chair ab bedside times 30 min.  Ambulates 10 feet in room to bed.  Resting after bolus of on Q pump.   No complaints of nausea.   R:  continue to monitor and treat per plan of care.

## 2021-01-21 NOTE — CONSULTS
Care Management Initial Consult    General Information  Assessment completed with: Patient    Type of CM/SW Visit: Initial Assessment  Primary Care Provider verified and updated as needed: Yes   Readmission within the last 30 days: no previous admission in last 30 days   Reason for Consult: discharge planning  Advance Care Planning: Declines at this time.      Communication Assessment  Patient's communication style: spoken language (English or Bilingual)    Hearing Difficulty or Deaf: no   Wear Glasses or Blind: no    Cognitive  Cognitive/Neuro/Behavioral: WDL                      Living Environment:   People in home: other relative(s)     Current living Arrangements: house      Able to return to prior arrangements: yes     Family/Social Support:  Care provided by: self  Provides care for: no one    Description of Support System: Supportive, Involved      Current Resources:   Skilled Home Care Services:  None  Community Resources: None  Equipment currently used at home: None  Supplies currently used at home: None    Employment/Financial:  Employment Status: unemployed        Financial Concerns: No concerns identified     Lifestyle & Psychosocial Needs:        Socioeconomic History     Marital status: Single     Spouse name: Not on file     Number of children: Not on file     Years of education: Not on file     Highest education level: Not on file     Tobacco Use     Smoking status: Never Smoker     Smokeless tobacco: Never Used   Substance and Sexual Activity     Alcohol use: Not Currently       Functional Status:  Prior to admission patient needed assistance: [place].     Additional Information:  Care management assessment completed due to increased unplanned readmission risk score, contacted via hospital phone to complete assessment. Patient confirms that he lives locally w/his uncle, independently. Patient denies any in home services or needs at this time, confirms family will provide transportation at  discharge. Per charge nurse, will likely transfer to  for ongoing care, care coordination will follow for possible discharge needs.     Rain Ron, RNCC, BSN    [facility]    [facility]  [address] [place] [id number]    [email or web address]    Office: [phone] Pager: [phone]  To contact the weekend RNCC, page [phone].

## 2021-01-21 NOTE — PROVIDER NOTIFICATION
Patient has Ropivacaine drip @ 14 ml/hr one drip was loose at change shift when did handoff with night RN. Page placed to ( Raps #[id number]) and update Colorectal team and aware .

## 2021-01-21 NOTE — PLAN OF CARE
Admitted/transferred from:   2 RN full   skin assessment completed by Radha Bhardwaj, RN and Justino RASHID RN.  Skin assessment finding: old internal jugular site covered with Tegaderm right side, RFA PIV, midline incision staples FAVIOLA/CDI, band aid from old ON-Q site on back   Interventions/actions: skin interventions monitor surgical incision      Activity: stand by assist  Neuros: alert and oriented x 3  Cardiac: tachycardic - service aware  Respiratory: room air, IS  GI/: hypoactive bowel sounds, voiding  Diet: NPO  Skin: midline incision staples FAVIOLA/CDI  Lines/Drains: PIV with IV fluids, right chest port de accessed  Plan: monitor pain

## 2021-01-21 NOTE — PLAN OF CARE
OT 5B: cancel, pt declining therapy upon late AM attempt 2/2 back pain from Xray procedure, then transferring units in PM. Unable to check back in with schedule demands, will reschedule.

## 2021-01-21 NOTE — PLAN OF CARE
"Blood pressure 131/81, pulse 122, temperature 97.8  F (36.6  C), temperature source Axillary, resp. rate 18, height 1.702 m (5' 7.01\"), weight 68.6 kg (151 lb 3.8 oz), SpO2 99 %. RA.        Assumed care 10am to 19:00  Neuro: A/Ox4, following commands with equal strength in all extremiteis. PERRL. Pt denies numbness or tingling.  Resp: RA  Pain . Received Dilaudid IV PRN  x 2 .   Cardiac: Sinus tachy 100-130's. BP WDL.  GI: NPO except meds and ice chips, Per MD order  hypoactive bowel sounds, emesis  x 2 color green bail . Zofran and compazine  IV PRN given. Colorectal team update a aware .    : Townsend removed @ 19:00  Skin: No new skin concerns. Abdominal incision remains WDL.  Line: PIVx2  Gtt: D5 1/2 NS    Plan : continue monitor closely and update MD with change.     "

## 2021-01-21 NOTE — PROGRESS NOTES
"Urology Daily Progress Note    24 hour events/Subjective:     - TMax 100.6 overnight at 8PM   - Continues to be tachycardic, normotensive   - Townsend removed yesterday   - Emesis yesterday night,placed  NPO   - Ambulated yesterday    O:  Vitals: /84 (BP Location: Left arm)   Pulse 117   Temp 99.7  F (37.6  C) (Oral)   Resp 16   Ht 1.702 m (5' 7.01\")   Wt 74.3 kg (163 lb 11.2 oz)   SpO2 93%   BMI 25.63 kg/m      General: Alert, interactive, in NAD  Resp: Non-labored breathing on RA  Abdomen: Soft, appropriately tender, midline incision CDI with staples, mildly distended appropriately tender  Ext: Warm and well perfused, no edema   Townsend: Clear yellow     I/O  UOP  1150/NR    Labs    Heme:  Recent Labs   Lab 01/21/21  0442 01/20/21  0717 01/20/21  0435 01/19/21  0443   WBC 10.7 11.1* 11.8* 11.7*   HGB 7.0* 7.6* 7.8* 10.1*   * 136* 143* 186     Chem:  Recent Labs   Lab 01/21/21  0442 01/20/21  0435 01/19/21  1144 01/19/21  0443    137 141 142   POTASSIUM 3.9 4.4 5.1 5.6*   CR 1.59* 1.72* 1.88* 1.68*       Assessment/Plan  25 year old male with mixed germ cell tumor and history of left radical orchiectomy, subsequent VIP now s/p left radical nephrectomy, resection of retroperitoneal mass, lymphadenectomy, left colectomy, resection of proximal jejunectomy on 1/18/21 given involvement of TIA and left colonic mesenteric involvement noted intraoperatively, vital signs normalizing with persistent tachycardia    Note - plan changes for the day are in BOLD  NEURO Pain controlled on APAP, Epidural, PCA; PRN Robaxin   CV Normotensive, tachycardic, central line placed   PULM Aggressive pulmonary toilet and I/S.   FEN/GI IVF: D5 1/2 NS @125  Diet: NPO -  Will await CRS recs  Bowel regimen: None    Townsend catheter in place, trial of void today   HEME Hgb as above.    ID Afebrile, no leukocytosis.    Antibiotics: Completed periop antibiotics    ENDO No issues   ACTIVITY Up as tolerated    PPx SCDs, " ambulation, Heparin 5000 TID   HOME RX ON HOLD Amlodipine, dexamethason, oxycodone, miralax, compazine, zofran   DISPO Floor     PT/OT recommendations: Pending     Seen and examined with chief resident, to be discussed with Dr. Major    --    Alona Salcido MD  PGY-2 Urology

## 2021-01-21 NOTE — PROGRESS NOTES
"Colorectal Surgery Progress Note     POD 3 s/p resection of proximal jejunum and L colectomy with resection of L retroperitoneal mass and L nephrectomy     Subjective:   He slept well last night. Episode of emesis yesterday. Says his emesis is related to the tylenol. No nausea this morning. Positive flatus. No BM. Pain moderately controlled at 4/10. Ambulating around the room.    Physical Exam:  /84 (BP Location: Left arm)   Pulse 117   Temp 99.7  F (37.6  C) (Oral)   Resp 16   Ht 1.702 m (5' 7.01\")   Wt 74.3 kg (163 lb 11.2 oz)   SpO2 93%   BMI 25.63 kg/m      Afebrile, VSS and WNL  Fluent and articulate speech  Grossly alert and oriented  Breathing comfortably on room air  Abdomen is firm, non-distended, appropriate tenderness to palpation. Mid abdominal incision clean and dry with staples intact.   No lower extremity edema, warm and well perfused    ASSESSMENT/PLAN:      Cyrus Huertas Jr. 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 He is progressing well. Creatinine down trending.     -Recommend NPO.   -Obtain KUB  -Pain control PRN. Discontinue tylenol. Use muscle relaxer PRN.   -OOB and continue working with therapies  -Primary cares per urology    Patient seen and discussed with colorectal fellow, Dr. Perez, and discussed with staff.     Farida Mena  General Surgery Resident, PGY-1  "

## 2021-01-21 NOTE — PLAN OF CARE
"  Transfer  Transferred to: 7B   Via: WC  Reason for transfer:on going care  Patient : Aware of transfer  Belongings: Packed and sent with pt  Chart: Delivered with pt to next unit  Report given to: VERO RN.      Assessment:  Neuro: A/Ox4, following commands with equal strength in all extremiteis. PERRL. Pt denies numbness or tingling.  Resp: RA   Cardiac: Sinus tachy 100-119 ,BP WDL.  GI: NPO except meds and ice chips, hypoactive bowel sounds. Received  Oxycodone 10 mg po x 1 and given Dilaudid IV x 1.   : void adequate.  Skin: No new skin concerns. Abdominal incision remains WDL.  Line: PIVx 1  Gtt: D5 1/2 NS    Blood pressure 116/80, pulse 119, temperature 98.7  F (37.1  C), temperature source Oral, resp. rate 15, height 1.702 m (5' 7.01\"), weight 74.3 kg (163 lb 11.2 oz), SpO2 94 %. RA.     "

## 2021-01-21 NOTE — PROGRESS NOTES
REGIONAL ANESTHESIA PAIN SERVICE FOLLOW UP NOTE  Cyrus Huertas Jr. 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 who is now POD #3 s/p exploratory laparotomy, resection of proximal jejunum and left colectomy with resection of left retroperitoneal mass and left nephrectomy; bilateral T5-6 erector spinae (ES) catheters placed on 1/18/21 for acute postoperative analgesia    Subjective and Interval History: Overnight no acute events.  Patient reports good pain control with local anesthetic bolus Q 12 hr, and patient requesting bolus this AM, but when patient repositioned to check catheter sites writer noted large area of wetness on the bed, exposed end of left nerve block catheter that had been disconnected from OnQ tubing and right nerve block catheter site was exposed due to dressing being rolled up.  Patient denies weakness, paresthesias, circumoral numbness, metallic taste or tinnitus.  Able to stand and ambulate with standby assistance.    Antithrombotic/Thrombolytic Therapy: Last dose Heparin SQ administered today at 0023     Medications related to Pain Management (From now, onward)    Start     Dose/Rate Route Frequency Ordered Stop    01/21/21 0900  acetaminophen (TYLENOL) tablet 650 mg      650 mg Oral EVERY 4 HOURS PRN 01/21/21 0835      01/19/21 1430  oxyCODONE (ROXICODONE) tablet 5-10 mg      5-10 mg Oral or Feeding Tube EVERY 4 HOURS PRN 01/19/21 1429      01/19/21 1159  HYDROmorphone (DILAUDID) injection 0.2-0.4 mg      0.2-0.4 mg Intravenous EVERY 2 HOURS PRN 01/19/21 1159      01/19/21 0316  methocarbamol (ROBAXIN) tablet 750 mg      750 mg Oral 4 TIMES DAILY PRN 01/19/21 0316              Objective  Lab Results     Recent Labs   Lab Test 01/21/21  0442   WBC 10.7   RBC 2.28*   HGB 7.0*   HCT 22.3*   MCV 98   MCH 30.7   MCHC 31.4*   RDW 17.0*   *     No results found for: INR    BP  "116/80 (BP Location: Left arm)   Pulse 119   Temp 98.7  F (37.1  C) (Oral)   Resp 15   Ht 1.702 m (5' 7.01\")   Wt 74.3 kg (163 lb 11.2 oz)   SpO2 94%   BMI 25.63 kg/m       Exam:  Constitutional: alert and no distress  Neuro/MSK:  Strength BLE 5/5  and overall symmetric    Assessment  POD #3 s/p exploratory laparotomy, resection of proximal jejunum and left colectomy with resection of left retroperitoneal mass and left nephrectomy; bilateral T5-6 erector spinae (ES) catheters placed on 1/18/21 for acute postoperative analgesia  Unfortunately left nerve block catheter was inadvertently disconnected for unknown period of time and found with right catheter insertion site exposed due to rolling off of dressing.  Patient reports moderate pain control with multimodal analgesia.  Is meeting activity goals. No weakness or paresthesias. No evidence of adverse side effects associated with local anesthetic.     Plan  0915 due to exposed and disconnected catheter and infection risk, both erector spinae (ES) catheters were removed without complication, dark tips intact.  Insertion sites c/d/i, no heme, edema, tenderness. Small bandages applied  - okay to administer Heparin SQ in one hour - Bedside RN aware of plans.  - RAPS will sign off    Thank you for allowing us the opportunity to participate in the care of Cyrus DE ANDA Bandarmaria guadalupe .  - discussed plan with attending anesthesiologist    DEXTER [facility]   Regional Anesthesia Pain Service      RAPS Contact Info (for in-house use only):  Job code ID: [id number]   [facility] [address]99  [facility] [address]  [place] phone: dial * * * [phone], enter jobcode ID, then enter call-back number.    Text: Use [employer] on the Intranet <Paging/Directory> tab and enter Jobcode ID.   If no call back at any time, contact the hospital  and ask for RAPS attending or backup     "

## 2021-01-22 ENCOUNTER — APPOINTMENT (OUTPATIENT)
Dept: OCCUPATIONAL THERAPY | Facility: CLINIC | Age: 26
End: 2021-01-22
Attending: UROLOGY
Payer: COMMERCIAL

## 2021-01-22 LAB
ABO + RH BLD: NORMAL
ABO + RH BLD: NORMAL
ANION GAP SERPL CALCULATED.3IONS-SCNC: 6 MMOL/L (ref 3–14)
BLD GP AB SCN SERPL QL: NORMAL
BLD PROD TYP BPU: NORMAL
BLD PROD TYP BPU: NORMAL
BLD UNIT ID BPU: 0
BLOOD BANK CMNT PATIENT-IMP: NORMAL
BLOOD PRODUCT CODE: NORMAL
BPU ID: NORMAL
BUN SERPL-MCNC: 15 MG/DL (ref 7–30)
CALCIUM SERPL-MCNC: 8.4 MG/DL (ref 8.5–10.1)
CHLORIDE SERPL-SCNC: 113 MMOL/L (ref 94–109)
CO2 SERPL-SCNC: 24 MMOL/L (ref 20–32)
COPATH REPORT: NORMAL
CREAT SERPL-MCNC: 1.52 MG/DL (ref 0.66–1.25)
ERYTHROCYTE [DISTWIDTH] IN BLOOD BY AUTOMATED COUNT: 16.3 % (ref 10–15)
GFR SERPL CREATININE-BSD FRML MDRD: 62 ML/MIN/{1.73_M2}
GLUCOSE SERPL-MCNC: 85 MG/DL (ref 70–99)
HCT VFR BLD AUTO: 20.2 % (ref 40–53)
HGB BLD-MCNC: 6.4 G/DL (ref 13.3–17.7)
HGB BLD-MCNC: 7.6 G/DL (ref 13.3–17.7)
MAGNESIUM SERPL-MCNC: 1.8 MG/DL (ref 1.6–2.3)
MCH RBC QN AUTO: 30.5 PG (ref 26.5–33)
MCHC RBC AUTO-ENTMCNC: 31.7 G/DL (ref 31.5–36.5)
MCV RBC AUTO: 96 FL (ref 78–100)
NUM BPU REQUESTED: 1
PHOSPHATE SERPL-MCNC: 2.8 MG/DL (ref 2.5–4.5)
PLATELET # BLD AUTO: 161 10E9/L (ref 150–450)
POTASSIUM SERPL-SCNC: 3.8 MMOL/L (ref 3.4–5.3)
RBC # BLD AUTO: 2.1 10E12/L (ref 4.4–5.9)
SODIUM SERPL-SCNC: 143 MMOL/L (ref 133–144)
SPECIMEN EXP DATE BLD: NORMAL
TRANSFUSION STATUS PATIENT QL: NORMAL
TRANSFUSION STATUS PATIENT QL: NORMAL
WBC # BLD AUTO: 7 10E9/L (ref 4–11)

## 2021-01-22 PROCEDURE — 86923 COMPATIBILITY TEST ELECTRIC: CPT | Performed by: UROLOGY

## 2021-01-22 PROCEDURE — 86900 BLOOD TYPING SEROLOGIC ABO: CPT | Performed by: UROLOGY

## 2021-01-22 PROCEDURE — 258N000001 HC RX 258: Performed by: STUDENT IN AN ORGANIZED HEALTH CARE EDUCATION/TRAINING PROGRAM

## 2021-01-22 PROCEDURE — 86901 BLOOD TYPING SEROLOGIC RH(D): CPT | Performed by: UROLOGY

## 2021-01-22 PROCEDURE — 250N000011 HC RX IP 250 OP 636: Performed by: PHYSICIAN ASSISTANT

## 2021-01-22 PROCEDURE — 97530 THERAPEUTIC ACTIVITIES: CPT | Mod: GO | Performed by: OCCUPATIONAL THERAPIST

## 2021-01-22 PROCEDURE — 250N000011 HC RX IP 250 OP 636: Performed by: STUDENT IN AN ORGANIZED HEALTH CARE EDUCATION/TRAINING PROGRAM

## 2021-01-22 PROCEDURE — 250N000013 HC RX MED GY IP 250 OP 250 PS 637: Performed by: STUDENT IN AN ORGANIZED HEALTH CARE EDUCATION/TRAINING PROGRAM

## 2021-01-22 PROCEDURE — 83735 ASSAY OF MAGNESIUM: CPT | Performed by: STUDENT IN AN ORGANIZED HEALTH CARE EDUCATION/TRAINING PROGRAM

## 2021-01-22 PROCEDURE — 85018 HEMOGLOBIN: CPT | Performed by: UROLOGY

## 2021-01-22 PROCEDURE — 999N000128 HC STATISTIC PERIPHERAL IV START W/O US GUIDANCE

## 2021-01-22 PROCEDURE — 36415 COLL VENOUS BLD VENIPUNCTURE: CPT | Performed by: UROLOGY

## 2021-01-22 PROCEDURE — 97110 THERAPEUTIC EXERCISES: CPT | Mod: GO | Performed by: OCCUPATIONAL THERAPIST

## 2021-01-22 PROCEDURE — 80048 BASIC METABOLIC PNL TOTAL CA: CPT | Performed by: STUDENT IN AN ORGANIZED HEALTH CARE EDUCATION/TRAINING PROGRAM

## 2021-01-22 PROCEDURE — P9016 RBC LEUKOCYTES REDUCED: HCPCS | Performed by: UROLOGY

## 2021-01-22 PROCEDURE — 36415 COLL VENOUS BLD VENIPUNCTURE: CPT | Performed by: STUDENT IN AN ORGANIZED HEALTH CARE EDUCATION/TRAINING PROGRAM

## 2021-01-22 PROCEDURE — 120N000002 HC R&B MED SURG/OB UMMC

## 2021-01-22 PROCEDURE — 83735 ASSAY OF MAGNESIUM: CPT | Performed by: PHYSICIAN ASSISTANT

## 2021-01-22 PROCEDURE — 86850 RBC ANTIBODY SCREEN: CPT | Performed by: UROLOGY

## 2021-01-22 PROCEDURE — 97535 SELF CARE MNGMENT TRAINING: CPT | Mod: GO | Performed by: OCCUPATIONAL THERAPIST

## 2021-01-22 PROCEDURE — 85027 COMPLETE CBC AUTOMATED: CPT | Performed by: STUDENT IN AN ORGANIZED HEALTH CARE EDUCATION/TRAINING PROGRAM

## 2021-01-22 PROCEDURE — 84100 ASSAY OF PHOSPHORUS: CPT | Performed by: STUDENT IN AN ORGANIZED HEALTH CARE EDUCATION/TRAINING PROGRAM

## 2021-01-22 RX ORDER — FUROSEMIDE 10 MG/ML
10 INJECTION INTRAMUSCULAR; INTRAVENOUS ONCE
Status: COMPLETED | OUTPATIENT
Start: 2021-01-22 | End: 2021-01-22

## 2021-01-22 RX ADMIN — HEPARIN SODIUM 5000 UNITS: 5000 INJECTION, SOLUTION INTRAVENOUS; SUBCUTANEOUS at 09:09

## 2021-01-22 RX ADMIN — HEPARIN SODIUM 5000 UNITS: 5000 INJECTION, SOLUTION INTRAVENOUS; SUBCUTANEOUS at 17:53

## 2021-01-22 RX ADMIN — HYDROMORPHONE HYDROCHLORIDE 0.4 MG: 0.2 INJECTION, SOLUTION INTRAMUSCULAR; INTRAVENOUS; SUBCUTANEOUS at 14:38

## 2021-01-22 RX ADMIN — DEXTROSE MONOHYDRATE AND SODIUM CHLORIDE: 5; .45 INJECTION, SOLUTION INTRAVENOUS at 06:05

## 2021-01-22 RX ADMIN — HYDROMORPHONE HYDROCHLORIDE 0.4 MG: 0.2 INJECTION, SOLUTION INTRAMUSCULAR; INTRAVENOUS; SUBCUTANEOUS at 17:52

## 2021-01-22 RX ADMIN — FUROSEMIDE 10 MG: 10 INJECTION, SOLUTION INTRAMUSCULAR; INTRAVENOUS at 14:12

## 2021-01-22 RX ADMIN — HYDROMORPHONE HYDROCHLORIDE 0.4 MG: 0.2 INJECTION, SOLUTION INTRAMUSCULAR; INTRAVENOUS; SUBCUTANEOUS at 20:12

## 2021-01-22 RX ADMIN — OXYCODONE HYDROCHLORIDE 10 MG: 5 TABLET ORAL at 11:44

## 2021-01-22 RX ADMIN — DEXTROSE MONOHYDRATE AND SODIUM CHLORIDE: 5; .45 INJECTION, SOLUTION INTRAVENOUS at 20:10

## 2021-01-22 RX ADMIN — HYDROMORPHONE HYDROCHLORIDE 0.4 MG: 0.2 INJECTION, SOLUTION INTRAMUSCULAR; INTRAVENOUS; SUBCUTANEOUS at 07:09

## 2021-01-22 RX ADMIN — OXYCODONE HYDROCHLORIDE 10 MG: 5 TABLET ORAL at 16:10

## 2021-01-22 RX ADMIN — HEPARIN SODIUM 5000 UNITS: 5000 INJECTION, SOLUTION INTRAVENOUS; SUBCUTANEOUS at 01:17

## 2021-01-22 ASSESSMENT — ACTIVITIES OF DAILY LIVING (ADL)
ADLS_ACUITY_SCORE: 16

## 2021-01-22 NOTE — PLAN OF CARE
Activity: stand by assist, working with PT this afternoon  Neuros: alert and oriented  Cardiac: tachycardic  Respiratory: room air, denies shortness of breath, IS encouraged  GI/: passing gas, voiding with urinal  Diet: clear liquid diet, denies nausea  Skin: surgical incision staples midline FAVIOLA  Lines/Drains: LFA PIV D5 .45% at 100/hour  Plan: IV dilaudid given 0.4mg x 2 this shift, 1 unit of blood transfused (IV lasix given after), Hgb recheck

## 2021-01-22 NOTE — PROGRESS NOTES
"Colorectal Surgery Progress Note     POD 4 s/p resection of proximal jejunum and L colectomy with resection of L retroperitoneal mass and L nephrectomy     Subjective:   No emesis overnight. Epidural catheters removed. Pain well controlled on current regimen. No BM. Positive flatus. Taking walks and sitting up.     Physical Exam:  /75 (BP Location: Right arm)   Pulse 115   Temp 99.5  F (37.5  C) (Oral)   Resp 16   Ht 1.702 m (5' 7.01\")   Wt 74.3 kg (163 lb 11.2 oz)   SpO2 95%   BMI 25.63 kg/m      Afebrile, VSS and WNL  Fluent and articulate speech  Grossly alert and oriented  Breathing comfortably on room air  Abdomen is soft, non-distended, no tenderness to palpation. Mid abdominal incision clean and dry with staples intact.   No lower extremity edema, warm and well perfused    ASSESSMENT/PLAN:      Cyrus Huertas Jr. 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 He is progressing well. Creatinine down trending.     -Ok to resume clear liquid diet as tolerated.  -Pain control PRN. Muscle relaxer PRN.   -OOB and continue working with therapies  -Primary cares per urology    Patient seen and discussed with colorectal fellow, Dr. Perez, and discussed with staff.    Davy Trejo'th, MS3  Colorectal Service      I, Farida Mena MD, saw the patient with the medical student and have edited the note to reflect our combined findings. I agree with the history, exam, assessment and plan as outlined above and have discussed this patient with the colorectal fellow who will discuss with staff.     Farida Mena  General Surgery Resident, PGY-1  "

## 2021-01-22 NOTE — PROGRESS NOTES
"Urology Daily Progress Note    24 hour events/Subjective:     - TMax 100.7 @ 8pm yesterday   - Continues to be tachycardic   - Feels well, passing gas, out of bed   - Pain adequately controlled   - Denies fevers, chills, nausea, vomiting    O:  Vitals: /75 (BP Location: Right arm)   Pulse 115   Temp 99.5  F (37.5  C) (Oral)   Resp 16   Ht 1.702 m (5' 7.01\")   Wt 74.3 kg (163 lb 11.2 oz)   SpO2 95%   BMI 25.63 kg/m      General: Alert, interactive, in NAD  Resp: Non-labored breathing on RA  Abdomen: Soft, appropriately tender, midline incision CDI with staples, mildly distended appropriately tender  Ext: Warm and well perfused, no edema     I/O  UOP 2500/1700    Labs    Heme:  Recent Labs   Lab 01/21/21  1141 01/21/21  0442 01/20/21  0717 01/20/21  0435 01/19/21  0443   WBC  --  10.7 11.1* 11.8* 11.7*   HGB 7.1* 7.0* 7.6* 7.8* 10.1*   PLT  --  146* 136* 143* 186     Chem:  Recent Labs   Lab 01/21/21  0442 01/20/21  0435 01/19/21  1144 01/19/21  0443    137 141 142   POTASSIUM 3.9 4.4 5.1 5.6*   CR 1.59* 1.72* 1.88* 1.68*       Assessment/Plan  25 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   FEN/GI IVF: D5 1/2 NS @100  Diet: NPO, will discuss with CRS  Bowel regimen: None  Nutrition consult    Voiding spontaneously   HEME Hgb as above.    ID Afebrile, no leukocytosis.    Antibiotics: Completed periop antibiotics    ENDO No issues "   ACTIVITY Up as tolerated    PPx SCDs, ambulation, Heparin 5000 TID   HOME RX ON HOLD Amlodipine, dexamethason, oxycodone, miralax, compazine, zofran   DISPO Floor     PT/OT recommendations: Pending     Seen and examined with chief resident, to be discussed with Dr. Major    --    Bryn Johnson MD MS  Urology Resident, PGY-2

## 2021-01-22 NOTE — PLAN OF CARE
Patient denies SOB, diminished basal, stable on RA. 100.7 temp at start of shift; Tylenol given. Hypo BS, firm slightly distended abdomen, denies nausea. Mid abdominal incision jacob, with staples, cdi. Robaxin given for pain 1x with Tylenol, and since then he has been declining pain med when offered. Sleeps in between cares. Continue poc.   Vitals:    01/21/21 2003 01/22/21 0121 01/22/21 0709 01/22/21 0726   BP: 137/79 128/75 121/68 129/82   BP Location: Right arm Right arm Right arm Left arm   Pulse: 131 115 110 107   Resp: 16 16 18 16   Temp: 100.7  F (38.2  C) 99.5  F (37.5  C) 99.2  F (37.3  C) 96.5  F (35.8  C)   TempSrc: Oral Oral Oral Oral   SpO2: 96% 95% 96% 97%   Weight:       Height:       0701: Notified MD for Hemoglobin result of 6.4 this morning.   Dilaudid 0.4 mg IV given per pt request.

## 2021-01-22 NOTE — PLAN OF CARE
"Lungs clear.  and regular. Abdomen slightly distended and firm. Midline incision CDI. Patient states that he has pain in abdomen from tightness of abdomen. States he has passed gas \"a couple times\". Oxycodone 10mg given x1. Pt wanted to sleep. Voiding in good amounts. Can now have an occasional ice chip.  "

## 2021-01-23 LAB
ANION GAP SERPL CALCULATED.3IONS-SCNC: 4 MMOL/L (ref 3–14)
BUN SERPL-MCNC: 10 MG/DL (ref 7–30)
CALCIUM SERPL-MCNC: 8.7 MG/DL (ref 8.5–10.1)
CHLORIDE SERPL-SCNC: 109 MMOL/L (ref 94–109)
CO2 SERPL-SCNC: 26 MMOL/L (ref 20–32)
CREAT SERPL-MCNC: 1.37 MG/DL (ref 0.66–1.25)
ERYTHROCYTE [DISTWIDTH] IN BLOOD BY AUTOMATED COUNT: 16.4 % (ref 10–15)
GFR SERPL CREATININE-BSD FRML MDRD: 70 ML/MIN/{1.73_M2}
GLUCOSE SERPL-MCNC: 98 MG/DL (ref 70–99)
HCT VFR BLD AUTO: 23.9 % (ref 40–53)
HGB BLD-MCNC: 7.8 G/DL (ref 13.3–17.7)
MCH RBC QN AUTO: 31 PG (ref 26.5–33)
MCHC RBC AUTO-ENTMCNC: 32.6 G/DL (ref 31.5–36.5)
MCV RBC AUTO: 95 FL (ref 78–100)
PLATELET # BLD AUTO: 171 10E9/L (ref 150–450)
POTASSIUM SERPL-SCNC: 3.8 MMOL/L (ref 3.4–5.3)
RBC # BLD AUTO: 2.52 10E12/L (ref 4.4–5.9)
SODIUM SERPL-SCNC: 139 MMOL/L (ref 133–144)
WBC # BLD AUTO: 6.1 10E9/L (ref 4–11)

## 2021-01-23 PROCEDURE — 250N000013 HC RX MED GY IP 250 OP 250 PS 637: Performed by: STUDENT IN AN ORGANIZED HEALTH CARE EDUCATION/TRAINING PROGRAM

## 2021-01-23 PROCEDURE — 80048 BASIC METABOLIC PNL TOTAL CA: CPT | Performed by: STUDENT IN AN ORGANIZED HEALTH CARE EDUCATION/TRAINING PROGRAM

## 2021-01-23 PROCEDURE — 258N000001 HC RX 258: Performed by: STUDENT IN AN ORGANIZED HEALTH CARE EDUCATION/TRAINING PROGRAM

## 2021-01-23 PROCEDURE — 120N000002 HC R&B MED SURG/OB UMMC

## 2021-01-23 PROCEDURE — 85027 COMPLETE CBC AUTOMATED: CPT | Performed by: STUDENT IN AN ORGANIZED HEALTH CARE EDUCATION/TRAINING PROGRAM

## 2021-01-23 PROCEDURE — 250N000011 HC RX IP 250 OP 636: Performed by: PHYSICIAN ASSISTANT

## 2021-01-23 PROCEDURE — 250N000011 HC RX IP 250 OP 636: Performed by: STUDENT IN AN ORGANIZED HEALTH CARE EDUCATION/TRAINING PROGRAM

## 2021-01-23 PROCEDURE — 36415 COLL VENOUS BLD VENIPUNCTURE: CPT | Performed by: STUDENT IN AN ORGANIZED HEALTH CARE EDUCATION/TRAINING PROGRAM

## 2021-01-23 RX ADMIN — HYDROMORPHONE HYDROCHLORIDE 0.4 MG: 0.2 INJECTION, SOLUTION INTRAMUSCULAR; INTRAVENOUS; SUBCUTANEOUS at 20:03

## 2021-01-23 RX ADMIN — HEPARIN SODIUM 5000 UNITS: 5000 INJECTION, SOLUTION INTRAVENOUS; SUBCUTANEOUS at 20:08

## 2021-01-23 RX ADMIN — OXYCODONE HYDROCHLORIDE 10 MG: 5 TABLET ORAL at 18:41

## 2021-01-23 RX ADMIN — DEXTROSE MONOHYDRATE AND SODIUM CHLORIDE: 5; .45 INJECTION, SOLUTION INTRAVENOUS at 06:48

## 2021-01-23 RX ADMIN — HYDROMORPHONE HYDROCHLORIDE 0.4 MG: 0.2 INJECTION, SOLUTION INTRAMUSCULAR; INTRAVENOUS; SUBCUTANEOUS at 16:06

## 2021-01-23 RX ADMIN — HEPARIN SODIUM 5000 UNITS: 5000 INJECTION, SOLUTION INTRAVENOUS; SUBCUTANEOUS at 10:29

## 2021-01-23 RX ADMIN — METHOCARBAMOL 750 MG: 750 TABLET, FILM COATED ORAL at 07:35

## 2021-01-23 RX ADMIN — HEPARIN SODIUM 5000 UNITS: 5000 INJECTION, SOLUTION INTRAVENOUS; SUBCUTANEOUS at 02:34

## 2021-01-23 RX ADMIN — HYDROMORPHONE HYDROCHLORIDE 0.4 MG: 0.2 INJECTION, SOLUTION INTRAMUSCULAR; INTRAVENOUS; SUBCUTANEOUS at 02:32

## 2021-01-23 RX ADMIN — HYDROMORPHONE HYDROCHLORIDE 0.4 MG: 0.2 INJECTION, SOLUTION INTRAMUSCULAR; INTRAVENOUS; SUBCUTANEOUS at 23:40

## 2021-01-23 RX ADMIN — HYDROMORPHONE HYDROCHLORIDE 0.4 MG: 0.2 INJECTION, SOLUTION INTRAMUSCULAR; INTRAVENOUS; SUBCUTANEOUS at 00:06

## 2021-01-23 RX ADMIN — OXYCODONE HYDROCHLORIDE 10 MG: 5 TABLET ORAL at 07:35

## 2021-01-23 RX ADMIN — DEXTROSE MONOHYDRATE AND SODIUM CHLORIDE: 5; .45 INJECTION, SOLUTION INTRAVENOUS at 16:07

## 2021-01-23 RX ADMIN — HYDROMORPHONE HYDROCHLORIDE 0.4 MG: 0.2 INJECTION, SOLUTION INTRAMUSCULAR; INTRAVENOUS; SUBCUTANEOUS at 10:29

## 2021-01-23 RX ADMIN — HYDROMORPHONE HYDROCHLORIDE 0.4 MG: 0.2 INJECTION, SOLUTION INTRAMUSCULAR; INTRAVENOUS; SUBCUTANEOUS at 05:14

## 2021-01-23 ASSESSMENT — ACTIVITIES OF DAILY LIVING (ADL)
ADLS_ACUITY_SCORE: 16

## 2021-01-23 ASSESSMENT — MIFFLIN-ST. JEOR: SCORE: 1634.56

## 2021-01-23 NOTE — PROGRESS NOTES
"Colorectal Surgery Progress Note     POD 5 s/p resection of proximal jejunum and L colectomy with resection of L retroperitoneal mass and L nephrectomy     Subjective:   No nausea or emesis. Pain controlled. Positive flatus w/o bowel movement. Reports that it took time to adjust after receiving unit of blood yesterday, but now he feels better. Tachycardia improved.     Physical Exam:  /80 (BP Location: Left arm)   Pulse 101   Temp 98.9  F (37.2  C) (Oral)   Resp 16   Ht 1.702 m (5' 7.01\")   Wt 74.3 kg (163 lb 11.2 oz)   SpO2 99%   BMI 25.63 kg/m      Afebrile, VSS and WNL  Fluent and articulate speech, spontaneous movement of all four extremities   Breathing comfortably on room air  Abdomen is soft, non-distended, no tenderness to palpation. Mid abdominal incision clean and dry with staples intact.   No lower extremity edema, warm and well perfused    ASSESSMENT/PLAN:      Cyrus Huertas Jr. 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 He is progressing well.     -Recommend advance to full liquid diet   -Pain control PRN. Muscle relaxer PRN.   -OOB and continue working with therapies  -Primary cares per urology    Patient seen and discussed with colorectal fellow, Dr. Perez, and discussed with staff.    Josephine Husain  [employer], MS4    I, Farida Mena MD, saw the patient with the medical student and have edited the note to reflect our combined findings. I agree with the history, exam, assessment and plan as outlined above and have discussed this patient with the colorectal fellow who will discuss with staff.     Farida Mena  General Surgery " Resident, PGY-1

## 2021-01-23 NOTE — PLAN OF CARE
"Time: 2300 - 0730  Status: POD#5 s/p resection of proximal jejunum and L colectomy with resection of L retroperitoneal mass and L nephrectomy    Vitals: /83 (BP Location: Right arm)   Pulse 105   Temp 98.9  F (37.2  C) (Oral)   Resp 14   Ht 1.702 m (5' 7.01\")   Wt 74.3 kg (163 lb 11.2 oz)   SpO2 98%   BMI 25.63 kg/m       Activity: SBA when ambulating. Independently repositioning in bed.   Pain: PRN dilaudid given x 3 overnight.   Neuro: A&O x 4. Calm and cooperative with cares. Calls appropriately.     Cardiac: Tachycardic - within parameters. Denies cardiac chest pain.   Respiratory: LS clear - slightly diminished in bases. On RA sating 98%. Denies SOB, but reports some dyspnea on exertion.  GI/: Voiding spontaneously using bedside urinal. BS+. No BM this shift.   Diet: Clear liquid diet  Skin: Midline abdominal incision secured with staples - FAVIOLA. (R) neck dressing CDI.   LDAs: (L) PIV infusing D5 & 0.45% NaCl at 100 mL/hr.   New change for this shift: None.   Plan: Continue with POC.     "

## 2021-01-23 NOTE — PROGRESS NOTES
Vitals:   T-Max 99.8, Heart rate [id number]'s, OVSS  Neuro's: alert and oriented x 4. Neuro's intact  Respiratory: O2 Sats % on room air, Lungs clear-diminished in bases.  IS encouraged  GI/: passing gas, No BM yet,  Voiding good amounts  Diet: clear liquid diet, Good po, denies any nausea  Skin: Midline incision with staples, FAVIOLA. WNL   Lines/Drains:  PIV D5 .45% at 100/hour  Plan:   Oxycodone and IV Dilaudid providing good pain control.   1 unit of blood transfused on day shift,  Hgb recheck was 7.6   Activity: Up in room with SBA. Worked with PT today.

## 2021-01-23 NOTE — PROGRESS NOTES
"Urology Daily Progress Note    24 hour events/Subjective:     - TMax 100.2. Ongoing downtrending tachycardia   - Pain well controlled   - Flatus, no BM.    - Ambulating without assistance    O:  Vitals: /80 (BP Location: Left arm)   Pulse 101   Temp 98.9  F (37.2  C) (Oral)   Resp 16   Ht 1.702 m (5' 7.01\")   Wt 74.3 kg (163 lb 11.2 oz)   SpO2 99%   BMI 25.63 kg/m      General: Alert, interactive, in NAD  Resp: Non-labored breathing on RA  Abdomen: Soft, appropriately tender, midline incision CDI with staples, mildly distended appropriately tender  Ext: Warm and well perfused, no edema     I/O  UOP 2500/1700    Labs    Heme:  Recent Labs   Lab 01/23/21  0706 01/22/21  1610 01/22/21  0627 01/21/21  1141 01/21/21  0442 01/20/21  0717   WBC 6.1  --  7.0  --  10.7 11.1*   HGB 7.8* 7.6* 6.4* 7.1* 7.0* 7.6*     --  161  --  146* 136*     Chem:  Recent Labs   Lab 01/23/21  0706 01/22/21  0627 01/21/21  0442 01/20/21  0435    143 137 137   POTASSIUM 3.8 3.8 3.9 4.4   CR 1.37* 1.52* 1.59* 1.72*       Assessment/Plan  25 year old 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   FEN/GI IVF: D5 1/2 NS @100  Diet: Fulls today, will confirm likely low fat fulls  Bowel regimen: None  Nutrition consult    Voiding spontaneously   HEME Hgb as above.    ID Afebrile, no leukocytosis.    Antibiotics: Completed periop antibiotics    ENDO No issues   ACTIVITY Up as tolerated    PPx SCDs, " ambulation, Heparin 5000 TID   HOME RX ON HOLD Amlodipine, dexamethasone, oxycodone, miralax, compazine, zofran   DISPO Floor     PT/OT recommendations: Pending     Seen and examined with chief resident and staff Dr. Laguna, to be discussed with Dr. Major    --  Soraya Grant MD  Urology Resident        I saw Cyrus Huertas JrMiriam on rounds and discussed his care with my resident team.  He is s/p rplnd.  I performed a history and physical exam. I discussed my findings with my resident team.  I have reviewed their note and agree with the listed findings, assesment, and plan.  SAMEERA Laguna MD

## 2021-01-23 NOTE — PLAN OF CARE
Activity: stand by assist, patient in bed entire shift sleeping  Neuros:alert and oriented x 4  Cardiac: denies chest pain  Respiratory:room air, IS encouraged  GI/: passing flatus, + bowel sounds, voids with urinal  Diet: advanced to full liquids/low fat this shift (patient has not yet ordered)  Skin: surgical incision staples FAVIOLA  Lines/Drains: PIV with 0.9 at 100 mL/hour  Plan: continue to monitor pain

## 2021-01-24 LAB
ANION GAP SERPL CALCULATED.3IONS-SCNC: 7 MMOL/L (ref 3–14)
BUN SERPL-MCNC: 6 MG/DL (ref 7–30)
CALCIUM SERPL-MCNC: 8.4 MG/DL (ref 8.5–10.1)
CHLORIDE SERPL-SCNC: 107 MMOL/L (ref 94–109)
CO2 SERPL-SCNC: 23 MMOL/L (ref 20–32)
CREAT SERPL-MCNC: 1.22 MG/DL (ref 0.66–1.25)
ERYTHROCYTE [DISTWIDTH] IN BLOOD BY AUTOMATED COUNT: 16 % (ref 10–15)
GFR SERPL CREATININE-BSD FRML MDRD: 80 ML/MIN/{1.73_M2}
GLUCOSE SERPL-MCNC: 302 MG/DL (ref 70–99)
HCT VFR BLD AUTO: 23.5 % (ref 40–53)
HGB BLD-MCNC: 7.6 G/DL (ref 13.3–17.7)
MCH RBC QN AUTO: 30.5 PG (ref 26.5–33)
MCHC RBC AUTO-ENTMCNC: 32.3 G/DL (ref 31.5–36.5)
MCV RBC AUTO: 94 FL (ref 78–100)
PLATELET # BLD AUTO: 187 10E9/L (ref 150–450)
POTASSIUM SERPL-SCNC: 3.7 MMOL/L (ref 3.4–5.3)
RBC # BLD AUTO: 2.49 10E12/L (ref 4.4–5.9)
SODIUM SERPL-SCNC: 137 MMOL/L (ref 133–144)
WBC # BLD AUTO: 6 10E9/L (ref 4–11)

## 2021-01-24 PROCEDURE — 36415 COLL VENOUS BLD VENIPUNCTURE: CPT | Performed by: STUDENT IN AN ORGANIZED HEALTH CARE EDUCATION/TRAINING PROGRAM

## 2021-01-24 PROCEDURE — 250N000011 HC RX IP 250 OP 636: Performed by: STUDENT IN AN ORGANIZED HEALTH CARE EDUCATION/TRAINING PROGRAM

## 2021-01-24 PROCEDURE — 250N000011 HC RX IP 250 OP 636: Performed by: PHYSICIAN ASSISTANT

## 2021-01-24 PROCEDURE — 250N000013 HC RX MED GY IP 250 OP 250 PS 637: Performed by: PHYSICIAN ASSISTANT

## 2021-01-24 PROCEDURE — 258N000001 HC RX 258: Performed by: STUDENT IN AN ORGANIZED HEALTH CARE EDUCATION/TRAINING PROGRAM

## 2021-01-24 PROCEDURE — 250N000013 HC RX MED GY IP 250 OP 250 PS 637: Performed by: STUDENT IN AN ORGANIZED HEALTH CARE EDUCATION/TRAINING PROGRAM

## 2021-01-24 PROCEDURE — 80048 BASIC METABOLIC PNL TOTAL CA: CPT | Performed by: STUDENT IN AN ORGANIZED HEALTH CARE EDUCATION/TRAINING PROGRAM

## 2021-01-24 PROCEDURE — 120N000002 HC R&B MED SURG/OB UMMC

## 2021-01-24 PROCEDURE — 85027 COMPLETE CBC AUTOMATED: CPT | Performed by: STUDENT IN AN ORGANIZED HEALTH CARE EDUCATION/TRAINING PROGRAM

## 2021-01-24 RX ADMIN — HYDROMORPHONE HYDROCHLORIDE 0.4 MG: 0.2 INJECTION, SOLUTION INTRAMUSCULAR; INTRAVENOUS; SUBCUTANEOUS at 21:22

## 2021-01-24 RX ADMIN — DEXTROSE MONOHYDRATE AND SODIUM CHLORIDE: 5; .45 INJECTION, SOLUTION INTRAVENOUS at 01:44

## 2021-01-24 RX ADMIN — LIDOCAINE 2 PATCH: 560 PATCH PERCUTANEOUS; TOPICAL; TRANSDERMAL at 11:57

## 2021-01-24 RX ADMIN — HEPARIN SODIUM 5000 UNITS: 5000 INJECTION, SOLUTION INTRAVENOUS; SUBCUTANEOUS at 01:44

## 2021-01-24 RX ADMIN — HYDROMORPHONE HYDROCHLORIDE 0.4 MG: 0.2 INJECTION, SOLUTION INTRAMUSCULAR; INTRAVENOUS; SUBCUTANEOUS at 13:20

## 2021-01-24 RX ADMIN — HYDROMORPHONE HYDROCHLORIDE 0.4 MG: 0.2 INJECTION, SOLUTION INTRAMUSCULAR; INTRAVENOUS; SUBCUTANEOUS at 16:16

## 2021-01-24 RX ADMIN — HYDROMORPHONE HYDROCHLORIDE 0.4 MG: 0.2 INJECTION, SOLUTION INTRAMUSCULAR; INTRAVENOUS; SUBCUTANEOUS at 19:03

## 2021-01-24 RX ADMIN — HYDROMORPHONE HYDROCHLORIDE 0.4 MG: 0.2 INJECTION, SOLUTION INTRAMUSCULAR; INTRAVENOUS; SUBCUTANEOUS at 07:09

## 2021-01-24 RX ADMIN — HEPARIN SODIUM 5000 UNITS: 5000 INJECTION, SOLUTION INTRAVENOUS; SUBCUTANEOUS at 11:57

## 2021-01-24 RX ADMIN — HEPARIN SODIUM 5000 UNITS: 5000 INJECTION, SOLUTION INTRAVENOUS; SUBCUTANEOUS at 18:58

## 2021-01-24 RX ADMIN — OXYCODONE HYDROCHLORIDE 10 MG: 5 TABLET ORAL at 04:34

## 2021-01-24 ASSESSMENT — ACTIVITIES OF DAILY LIVING (ADL)
ADLS_ACUITY_SCORE: 16

## 2021-01-24 NOTE — PROGRESS NOTES
"Urology Daily Progress Note    24 hour events/Subjective:     -  AFVSS   - Tolerating PO, has not tried fulls ordered yesterday but had some broth   - Flatus   - Relying on dilaudid    O:  Vitals: /75 (BP Location: Right arm)   Pulse 109   Temp 99.9  F (37.7  C) (Oral)   Resp 16   Ht 1.702 m (5' 7.01\")   Wt 70.1 kg (154 lb 8 oz)   SpO2 98%   BMI 24.19 kg/m      General: Alert, interactive, in NAD  Resp: Non-labored breathing on RA  Abdomen: Soft, appropriately tender, midline incision CDI with staples, mildly distended appropriately tender  Ext: Warm and well perfused, no edema     I/O  UOP 5150/825    Labs    Heme:  Recent Labs   Lab 01/23/21  0706 01/22/21  1610 01/22/21  0627 01/21/21  1141 01/21/21  0442 01/20/21  0717   WBC 6.1  --  7.0  --  10.7 11.1*   HGB 7.8* 7.6* 6.4* 7.1* 7.0* 7.6*     --  161  --  146* 136*     Chem:  Recent Labs   Lab 01/23/21  0706 01/22/21  0627 01/21/21  0442 01/20/21  0435    143 137 137   POTASSIUM 3.8 3.8 3.9 4.4   CR 1.37* 1.52* 1.59* 1.72*       Assessment/Plan  25 year old 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 Lidocaine patches today, encourage oral pain meds.   CV Normotensive, tachycardia improving   PULM Aggressive pulmonary toilet and I/S.   FEN/GI IVF: D5 1/2 NS @50  Diet: Low fat fulls  Bowel regimen: None  Nutrition consult    Voiding spontaneously   HEME Hgb as above.    ID Afebrile, no leukocytosis.    Antibiotics: Completed periop antibiotics    ENDO No issues   ACTIVITY Up as tolerated    PPx SCDs, ambulation, Heparin 5000 TID   HOME RX ON HOLD Amlodipine, dexamethasone, oxycodone, miralax, compazine, zofran "   DISPO Floor     PT/OT recommendations: Pending     Seen and examined with chief resident and staff Dr. Laguna, to be discussed with Dr. Major    --  Soraya Grant MD  Urology Resident      I saw Cyrus Huertas Jr. on rounds and discussed his care with my resident team.  He is s/p rplnd.  I performed a history and physical exam. I discussed my findings with my resident team.  I have reviewed their note and agree with the listed findings, assesment, and plan.

## 2021-01-24 NOTE — PROGRESS NOTES
"Colorectal Surgery Progress Note     POD 6 s/p resection of proximal jejunum and L colectomy with resection of L retroperitoneal mass and L nephrectomy     Subjective:   Pain well controlled on current regimen. Tolerating liquids. No nausea or emesis. Serous drainage from wound he is worried about. Ambulating without difficulty.    Physical Exam:  /75 (BP Location: Right arm)   Pulse 109   Temp 99.9  F (37.7  C) (Oral)   Resp 16   Ht 1.702 m (5' 7.01\")   Wt 70.1 kg (154 lb 8 oz)   SpO2 98%   BMI 24.19 kg/m      No acute distress  Alert and oriented   Breathing comfortably on room air  Abdomen is soft, non-distended, (improved),  no tenderness to palpation. Mid abdominal incision clean and dry with staples intact. Minimal serosanguinous drainage  No lower extremity edema, warm and well perfused    ASSESSMENT/PLAN:      Cyrus Huertas Jr. 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 He is progressing well.     -Ok to advance to low fiber diet  -Pain control PRN. Muscle relaxer PRN.   -OOB and continue working with therapies  -Primary cares per urology    Patient seen and discussed with colorectal fellow, Dr. Perez, and discussed with staff.    Farida Mena  General Surgery Resident, PGY-1  "

## 2021-01-24 NOTE — PLAN OF CARE
Vitals:    01/23/21 0009 01/23/21 0726 01/23/21 1541 01/23/21 1639   BP: 131/83 108/80 133/81    BP Location: Right arm Left arm Right arm    Pulse: 105 101 104    Resp: 14 16 16    Temp: 98.9  F (37.2  C) 98.9  F (37.2  C) 99.5  F (37.5  C)    TempSrc: Oral Oral Oral    SpO2: 98% 99% 100%    Weight:    70.1 kg (154 lb 8 oz)   Height:         Neuro: Alert and oriented x4.    GI/: Abdomen hard to touch. Passing gas. No BM yet. No c/o nausea. Voiding in large amounts.     Diet: on a low fat diet but pt has been taking clears only.     Incisions/Drains: Abdominal incision with staples and FAVIOLA.     IV Access: PIV infusing D5%NS at 100cc per hr.     Labs: none this afternoon.    Activity: OOB with minimal assist. Encourage ambulation. Pt declined.    Pain: Using iv hydromorphone and oxycodone. Talked about using po PRN pain meds verses iv.     New changes this shift: None.     Plan: Encourage ambulation. Offer po pain medication verses iv when available as much as possible. Continue with current POC.

## 2021-01-24 NOTE — PLAN OF CARE
Activity: patient ambulated in hallway with stand by assist, walker/gait belt  Neuros: alert and oriented x 4  Cardiac: denies chest pain  Respiratory: room air  GI/:hypoactive BS, voiding  Diet: full liquid, patient only has taken a few bites of soup, fluids stopped this shift, PO fluids encouraged  Skin:surgical incision - staples, small amount of drainage  Lines/Drains: PIV saline locked  Plan:  encourage ambulation, PO intake, and monitor bowel sounds.

## 2021-01-24 NOTE — PLAN OF CARE
Tmax: 99.9. intermittently tachy, low 100's. OVSS. Continues to have abdominal/incisional pain-controlled with dilaudid x2 and oxycodone x1. Denies n/v/d. MIVF infusing at 50 ml/hr. Abdominal incision with staples, open to air, no drainage. Drank 3 apple juices overnight without any issues. Voiding adequately. No stools, bs hypoactive. Up with SBA. Continue plan of care.        Problem: Pain Acute  Goal: Acceptable Pain Control and Functional Ability  Outcome: No Change     Problem: Adult Inpatient Plan of Care  Goal: Plan of Care Review  Outcome: No Change  Flowsheets (Taken 1/24/2021 0649)  Plan of Care Reviewed With: patient  Progress: no change     Problem: Adult Inpatient Plan of Care  Goal: Patient-Specific Goal (Individualized)  Outcome: No Change     Problem: Adult Inpatient Plan of Care  Goal: Absence of Hospital-Acquired Illness or Injury  Outcome: No Change     Problem: Adult Inpatient Plan of Care  Goal: Optimal Comfort and Wellbeing  Outcome: No Change

## 2021-01-25 ENCOUNTER — APPOINTMENT (OUTPATIENT)
Dept: GENERAL RADIOLOGY | Facility: CLINIC | Age: 26
End: 2021-01-25
Attending: UROLOGY
Payer: COMMERCIAL

## 2021-01-25 LAB
ALBUMIN UR-MCNC: 10 MG/DL
ANION GAP SERPL CALCULATED.3IONS-SCNC: 6 MMOL/L (ref 3–14)
APPEARANCE UR: CLEAR
BILIRUB UR QL STRIP: NEGATIVE
BUN SERPL-MCNC: 7 MG/DL (ref 7–30)
CALCIUM SERPL-MCNC: 9.2 MG/DL (ref 8.5–10.1)
CHLORIDE SERPL-SCNC: 106 MMOL/L (ref 94–109)
CO2 SERPL-SCNC: 25 MMOL/L (ref 20–32)
COLOR UR AUTO: ABNORMAL
CREAT SERPL-MCNC: 1.25 MG/DL (ref 0.66–1.25)
ERYTHROCYTE [DISTWIDTH] IN BLOOD BY AUTOMATED COUNT: 15.5 % (ref 10–15)
ERYTHROCYTE [DISTWIDTH] IN BLOOD BY AUTOMATED COUNT: 15.6 % (ref 10–15)
GFR SERPL CREATININE-BSD FRML MDRD: 78 ML/MIN/{1.73_M2}
GLUCOSE SERPL-MCNC: 99 MG/DL (ref 70–99)
GLUCOSE UR STRIP-MCNC: NEGATIVE MG/DL
HCT VFR BLD AUTO: 25.3 % (ref 40–53)
HCT VFR BLD AUTO: 26 % (ref 40–53)
HGB BLD-MCNC: 8.3 G/DL (ref 13.3–17.7)
HGB BLD-MCNC: 8.3 G/DL (ref 13.3–17.7)
HGB UR QL STRIP: NEGATIVE
KETONES UR STRIP-MCNC: NEGATIVE MG/DL
LEUKOCYTE ESTERASE UR QL STRIP: NEGATIVE
MAGNESIUM SERPL-MCNC: 1.9 MG/DL (ref 1.6–2.3)
MCH RBC QN AUTO: 30.4 PG (ref 26.5–33)
MCH RBC QN AUTO: 30.5 PG (ref 26.5–33)
MCHC RBC AUTO-ENTMCNC: 31.9 G/DL (ref 31.5–36.5)
MCHC RBC AUTO-ENTMCNC: 32.8 G/DL (ref 31.5–36.5)
MCV RBC AUTO: 93 FL (ref 78–100)
MCV RBC AUTO: 95 FL (ref 78–100)
MUCOUS THREADS #/AREA URNS LPF: PRESENT /LPF
NITRATE UR QL: NEGATIVE
PH UR STRIP: 6.5 PH (ref 5–7)
PLATELET # BLD AUTO: 215 10E9/L (ref 150–450)
PLATELET # BLD AUTO: 239 10E9/L (ref 150–450)
POTASSIUM SERPL-SCNC: 4.3 MMOL/L (ref 3.4–5.3)
RBC # BLD AUTO: 2.72 10E12/L (ref 4.4–5.9)
RBC # BLD AUTO: 2.73 10E12/L (ref 4.4–5.9)
RBC #/AREA URNS AUTO: <1 /HPF (ref 0–2)
SODIUM SERPL-SCNC: 137 MMOL/L (ref 133–144)
SOURCE: ABNORMAL
SP GR UR STRIP: 1 (ref 1–1.03)
UROBILINOGEN UR STRIP-MCNC: NORMAL MG/DL (ref 0–2)
WBC # BLD AUTO: 7.7 10E9/L (ref 4–11)
WBC # BLD AUTO: 9.1 10E9/L (ref 4–11)
WBC #/AREA URNS AUTO: 1 /HPF (ref 0–5)

## 2021-01-25 PROCEDURE — 74018 RADEX ABDOMEN 1 VIEW: CPT | Mod: 26 | Performed by: STUDENT IN AN ORGANIZED HEALTH CARE EDUCATION/TRAINING PROGRAM

## 2021-01-25 PROCEDURE — 71045 X-RAY EXAM CHEST 1 VIEW: CPT | Mod: 26

## 2021-01-25 PROCEDURE — 71045 X-RAY EXAM CHEST 1 VIEW: CPT

## 2021-01-25 PROCEDURE — 83735 ASSAY OF MAGNESIUM: CPT | Performed by: STUDENT IN AN ORGANIZED HEALTH CARE EDUCATION/TRAINING PROGRAM

## 2021-01-25 PROCEDURE — 36415 COLL VENOUS BLD VENIPUNCTURE: CPT | Performed by: STUDENT IN AN ORGANIZED HEALTH CARE EDUCATION/TRAINING PROGRAM

## 2021-01-25 PROCEDURE — 120N000002 HC R&B MED SURG/OB UMMC

## 2021-01-25 PROCEDURE — 250N000013 HC RX MED GY IP 250 OP 250 PS 637: Performed by: STUDENT IN AN ORGANIZED HEALTH CARE EDUCATION/TRAINING PROGRAM

## 2021-01-25 PROCEDURE — 250N000011 HC RX IP 250 OP 636: Performed by: PHYSICIAN ASSISTANT

## 2021-01-25 PROCEDURE — 85027 COMPLETE CBC AUTOMATED: CPT | Performed by: STUDENT IN AN ORGANIZED HEALTH CARE EDUCATION/TRAINING PROGRAM

## 2021-01-25 PROCEDURE — 250N000011 HC RX IP 250 OP 636: Performed by: STUDENT IN AN ORGANIZED HEALTH CARE EDUCATION/TRAINING PROGRAM

## 2021-01-25 PROCEDURE — 74018 RADEX ABDOMEN 1 VIEW: CPT

## 2021-01-25 PROCEDURE — 258N000003 HC RX IP 258 OP 636: Performed by: STUDENT IN AN ORGANIZED HEALTH CARE EDUCATION/TRAINING PROGRAM

## 2021-01-25 PROCEDURE — 81001 URINALYSIS AUTO W/SCOPE: CPT | Performed by: STUDENT IN AN ORGANIZED HEALTH CARE EDUCATION/TRAINING PROGRAM

## 2021-01-25 PROCEDURE — 80048 BASIC METABOLIC PNL TOTAL CA: CPT | Performed by: STUDENT IN AN ORGANIZED HEALTH CARE EDUCATION/TRAINING PROGRAM

## 2021-01-25 PROCEDURE — 250N000013 HC RX MED GY IP 250 OP 250 PS 637: Performed by: PHYSICIAN ASSISTANT

## 2021-01-25 RX ORDER — DEXTROSE MONOHYDRATE, SODIUM CHLORIDE, AND POTASSIUM CHLORIDE 50; 1.49; 4.5 G/1000ML; G/1000ML; G/1000ML
INJECTION, SOLUTION INTRAVENOUS CONTINUOUS
Status: DISCONTINUED | OUTPATIENT
Start: 2021-01-25 | End: 2021-01-26

## 2021-01-25 RX ORDER — ACETAMINOPHEN 325 MG/1
975 TABLET ORAL EVERY 4 HOURS PRN
Status: DISCONTINUED | OUTPATIENT
Start: 2021-01-25 | End: 2021-01-28 | Stop reason: HOSPADM

## 2021-01-25 RX ORDER — MAGNESIUM OXIDE 400 MG/1
400 TABLET ORAL 2 TIMES DAILY
Status: DISPENSED | OUTPATIENT
Start: 2021-01-25 | End: 2021-01-27

## 2021-01-25 RX ORDER — HYDROMORPHONE HYDROCHLORIDE 2 MG/1
2-4 TABLET ORAL EVERY 4 HOURS PRN
Status: DISCONTINUED | OUTPATIENT
Start: 2021-01-25 | End: 2021-01-28 | Stop reason: HOSPADM

## 2021-01-25 RX ORDER — HYDROMORPHONE HCL IN WATER/PF 6 MG/30 ML
.2-.4 PATIENT CONTROLLED ANALGESIA SYRINGE INTRAVENOUS
Status: DISCONTINUED | OUTPATIENT
Start: 2021-01-25 | End: 2021-01-25

## 2021-01-25 RX ORDER — HYDROMORPHONE HCL IN WATER/PF 6 MG/30 ML
.2-.4 PATIENT CONTROLLED ANALGESIA SYRINGE INTRAVENOUS 2 TIMES DAILY PRN
Status: DISCONTINUED | OUTPATIENT
Start: 2021-01-25 | End: 2021-01-26

## 2021-01-25 RX ORDER — BISACODYL 10 MG
10 SUPPOSITORY, RECTAL RECTAL DAILY
Status: DISPENSED | OUTPATIENT
Start: 2021-01-25 | End: 2021-01-26

## 2021-01-25 RX ORDER — POLYETHYLENE GLYCOL 3350 17 G/17G
17 POWDER, FOR SOLUTION ORAL DAILY
Status: DISCONTINUED | OUTPATIENT
Start: 2021-01-25 | End: 2021-01-28 | Stop reason: HOSPADM

## 2021-01-25 RX ADMIN — HYDROMORPHONE HYDROCHLORIDE 2 MG: 2 TABLET ORAL at 10:52

## 2021-01-25 RX ADMIN — POTASSIUM CHLORIDE, DEXTROSE MONOHYDRATE AND SODIUM CHLORIDE: 150; 5; 450 INJECTION, SOLUTION INTRAVENOUS at 21:09

## 2021-01-25 RX ADMIN — HEPARIN SODIUM 5000 UNITS: 5000 INJECTION, SOLUTION INTRAVENOUS; SUBCUTANEOUS at 10:41

## 2021-01-25 RX ADMIN — HYDROMORPHONE HYDROCHLORIDE 0.4 MG: 0.2 INJECTION, SOLUTION INTRAMUSCULAR; INTRAVENOUS; SUBCUTANEOUS at 00:39

## 2021-01-25 RX ADMIN — ONDANSETRON 4 MG: 2 INJECTION INTRAMUSCULAR; INTRAVENOUS at 20:13

## 2021-01-25 RX ADMIN — POTASSIUM CHLORIDE, DEXTROSE MONOHYDRATE AND SODIUM CHLORIDE: 150; 5; 450 INJECTION, SOLUTION INTRAVENOUS at 21:11

## 2021-01-25 RX ADMIN — HYDROMORPHONE HYDROCHLORIDE 0.2 MG: 0.2 INJECTION, SOLUTION INTRAMUSCULAR; INTRAVENOUS; SUBCUTANEOUS at 13:18

## 2021-01-25 RX ADMIN — HEPARIN SODIUM 5000 UNITS: 5000 INJECTION, SOLUTION INTRAVENOUS; SUBCUTANEOUS at 01:18

## 2021-01-25 RX ADMIN — HYDROMORPHONE HYDROCHLORIDE 0.4 MG: 0.2 INJECTION, SOLUTION INTRAMUSCULAR; INTRAVENOUS; SUBCUTANEOUS at 05:53

## 2021-01-25 RX ADMIN — HEPARIN SODIUM 5000 UNITS: 5000 INJECTION, SOLUTION INTRAVENOUS; SUBCUTANEOUS at 19:57

## 2021-01-25 RX ADMIN — HYDROMORPHONE HYDROCHLORIDE 2 MG: 2 TABLET ORAL at 16:28

## 2021-01-25 RX ADMIN — METHOCARBAMOL 750 MG: 750 TABLET, FILM COATED ORAL at 19:57

## 2021-01-25 RX ADMIN — HYDROMORPHONE HYDROCHLORIDE 0.4 MG: 0.2 INJECTION, SOLUTION INTRAMUSCULAR; INTRAVENOUS; SUBCUTANEOUS at 08:22

## 2021-01-25 RX ADMIN — HYDROMORPHONE HYDROCHLORIDE 4 MG: 2 TABLET ORAL at 21:08

## 2021-01-25 RX ADMIN — LIDOCAINE 1 PATCH: 560 PATCH PERCUTANEOUS; TOPICAL; TRANSDERMAL at 08:18

## 2021-01-25 ASSESSMENT — MIFFLIN-ST. JEOR: SCORE: 1571.96

## 2021-01-25 ASSESSMENT — ACTIVITIES OF DAILY LIVING (ADL)
ADLS_ACUITY_SCORE: 16

## 2021-01-25 NOTE — PROGRESS NOTES
"Colorectal Surgery Progress Note     POD 7 s/p resection of proximal jejunum and L colectomy with resection of L retroperitoneal mass and L nephrectomy     Subjective:   Pain well controlled on current regimen. Tolerating bites of food. Going slow. No nausea or emesis. No BM but continues to have flatus.     Physical Exam:  /77 (BP Location: Right arm)   Pulse 107   Temp 99.9  F (37.7  C) (Oral)   Resp 16   Ht 1.702 m (5' 7.01\")   Wt 70.1 kg (154 lb 8 oz)   SpO2 99%   BMI 24.19 kg/m      No acute distress  Alert and oriented   Breathing comfortably on room air  Abdomen is soft, non-distended, no tenderness to palpation. Mid abdominal incision clean and dry with staples intact. No drainage noted  No lower extremity edema, warm and well perfused    ASSESSMENT/PLAN:      Cyrus Huertas Jr. 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 He is progressing well but no bowel movement since surgery.    -Continue low fiber diet  -Obtain KUB to assess for constipation. May need suppository.  -Pain control PRN. Muscle relaxer PRN.   -Encourage ambulation.  -Primary cares per urology    Patient seen and discussed with colorectal fellow, Dr. Perez, and discussed with staff.    Farida Mena  General Surgery Resident, PGY-1  "

## 2021-01-25 NOTE — PLAN OF CARE
"Vitals:    01/23/21 2327 01/24/21 0432 01/24/21 0718 01/24/21 1624   BP: 121/80 126/75 138/80 136/79   BP Location: Right arm Right arm Right arm Right arm   Pulse: 84 109 111 110   Resp: 16 16 16 16   Temp: 97.3  F (36.3  C) 99.9  F (37.7  C) 98.9  F (37.2  C) 99.9  F (37.7  C)   TempSrc: Axillary Oral Oral Oral   SpO2: 99% 98% 97% 98%   Weight:       Height:       AVSS.    Neuro: Alert and oriented x4. Flat affect.     GI/: Abdomen hard to touch. Pt reports passing gas. Good uop. Poor appetite. No c/o nausea. No laxatives ordered and pt prefers not using laxatives at this time. \" I haven't eaten anything\". No BM since surgery.     Diet: Low fiber diet. Menu of low fiber diet printed and given to pt.     Incisions/Drains: Abdominal incision with staples and FAVIOLA. No drainage noted.     IV Access: PIV - SL between iv hydromorphone. Declined oxycodone.     Labs: none this shift.    Activity: Independent with sitting up in room. Declined a walk this afternoon.    Pain: Left shoulder pain - new since this morning. Lido patch applied to left shoulder. Using iv pain medication Q 2-3 hrs.     Plan: Monitor GI status and a  FROBF. Encourage and assist with activity. Continue current cares.   "

## 2021-01-25 NOTE — PROGRESS NOTES
"Urology Daily Progress Note    24 hour events/Subjective:     - AF, still with mild tachycardia, normotensive   - Still requiring dilaudid for pain   - Denies fevers, chills, chest pain, shortness of breath, nausea, vomiting   - Still going slow with diet to avoid pushing himself    O:  Vitals: /77 (BP Location: Right arm)   Pulse 107   Temp 99.9  F (37.7  C) (Oral)   Resp 16   Ht 1.702 m (5' 7.01\")   Wt 70.1 kg (154 lb 8 oz)   SpO2 99%   BMI 24.19 kg/m      General: Alert, interactive, in NAD  Resp: Non-labored breathing on RA  Abdomen: Soft, non-tender, non-distended, midline incision CDI with staples, small amount of leakage at inferior aspect, unable to express additional fluid  Ext: Warm and well perfused, no edema     I/O  UOP 3775/500    Labs    Heme:  Recent Labs   Lab 01/25/21  0549 01/24/21  0702 01/23/21  0706 01/22/21  1610 01/22/21  0627   WBC 7.7 6.0 6.1  --  7.0   HGB 8.3* 7.6* 7.8* 7.6* 6.4*    187 171  --  161     Chem:  Recent Labs   Lab 01/24/21  0702 01/23/21  0706 01/22/21  0627 01/21/21  0442    139 143 137   POTASSIUM 3.7 3.8 3.8 3.9   CR 1.22 1.37* 1.52* 1.59*       Assessment/Plan  25 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 Lidocaine patches today, encourage oral pain meds.   CV Normotensive, tachycardia improving   PULM Aggressive pulmonary toilet and I/S.   FEN/GI IVF: discontinued  Diet: Low residue diet  Bowel regimen: None  Nutrition consult    Voiding spontaneously   HEME Hgb as above.    ID Afebrile, no leukocytosis.    Antibiotics: Completed periop antibiotics    ENDO No " issues   ACTIVITY Up as tolerated    PPx SCDs, ambulation, Heparin 5000 TID   HOME RX ON HOLD Amlodipine, dexamethasone, oxycodone, miralax, compazine, zofran   DISPO Floor     PT/OT recommendations: No acute skilled PT needs.     Seen and examined with chief resident, to be discussed with Dr. Major    --  Bryn Johnson MD  Urology Resident, PGY-2

## 2021-01-25 NOTE — PROGRESS NOTES
Activity: patient ambulated in hallway with stand by assist, walker/gait belt, pain is also on the left shoulder and that's where the pain patch is.  Neuros: alert and oriented x 4  Cardiac: denies chest pain  Respiratory: room air  GI/:hypoactive  Diet: full liquid, but did not eat much.  Skin:surgical incision - staples, small amount of drainage  Lines/Drains: PIV saline locked  Plan:  encourage ambulation, PO intake, and monitor bowel sounds. Voided in BR and had BM today

## 2021-01-25 NOTE — PLAN OF CARE
"Time: 2300 - 0730  Status: POD#7 s/p resection of proximal jejunum and L colectomy with resection of L retroperitoneal mass and L nephrectomy    Vitals: /79 (BP Location: Right arm)   Pulse 110   Temp 99.5  F (37.5  C) (Oral)   Resp 16   Ht 1.702 m (5' 7.01\")   Wt 70.1 kg (154 lb 8 oz)   SpO2 98%   BMI 24.19 kg/m       Activity: SBA when ambulating. Independently repositioning in bed.   Pain: PRN dilaudid given x 2 overnight.   Neuro: A&O x 4. Calm and cooperative with cares. Calls appropriately.     Cardiac: Tachycardic - within parameters. Denies cardiac chest pain.   Respiratory: LS clear - slightly diminished in bases. On RA sating 98%. Denies SOB, but reports some dyspnea on exertion.  GI/: Voiding spontaneously using bedside urinal. BS+. No BM this shift.   Diet: Low fiber/low fat diet  Skin: Midline abdominal incision secured with staples - FAVIOLA. (R) neck dressing CDI.   LDAs: (L) PIV saline locked.   New change for this shift: None.   Plan: Continue with POC.   "

## 2021-01-26 LAB
ANION GAP SERPL CALCULATED.3IONS-SCNC: 5 MMOL/L (ref 3–14)
BUN SERPL-MCNC: 9 MG/DL (ref 7–30)
CALCIUM SERPL-MCNC: 8.9 MG/DL (ref 8.5–10.1)
CHLORIDE SERPL-SCNC: 106 MMOL/L (ref 94–109)
CO2 SERPL-SCNC: 26 MMOL/L (ref 20–32)
CREAT SERPL-MCNC: 1.25 MG/DL (ref 0.66–1.25)
ERYTHROCYTE [DISTWIDTH] IN BLOOD BY AUTOMATED COUNT: 15.5 % (ref 10–15)
GFR SERPL CREATININE-BSD FRML MDRD: 78 ML/MIN/{1.73_M2}
GLUCOSE SERPL-MCNC: 110 MG/DL (ref 70–99)
HCT VFR BLD AUTO: 24 % (ref 40–53)
HGB BLD-MCNC: 7.9 G/DL (ref 13.3–17.7)
MCH RBC QN AUTO: 30.9 PG (ref 26.5–33)
MCHC RBC AUTO-ENTMCNC: 32.9 G/DL (ref 31.5–36.5)
MCV RBC AUTO: 94 FL (ref 78–100)
PLATELET # BLD AUTO: 230 10E9/L (ref 150–450)
POTASSIUM SERPL-SCNC: 4.1 MMOL/L (ref 3.4–5.3)
RBC # BLD AUTO: 2.56 10E12/L (ref 4.4–5.9)
SODIUM SERPL-SCNC: 137 MMOL/L (ref 133–144)
WBC # BLD AUTO: 7 10E9/L (ref 4–11)

## 2021-01-26 PROCEDURE — 250N000011 HC RX IP 250 OP 636: Performed by: STUDENT IN AN ORGANIZED HEALTH CARE EDUCATION/TRAINING PROGRAM

## 2021-01-26 PROCEDURE — 85027 COMPLETE CBC AUTOMATED: CPT | Performed by: STUDENT IN AN ORGANIZED HEALTH CARE EDUCATION/TRAINING PROGRAM

## 2021-01-26 PROCEDURE — 80048 BASIC METABOLIC PNL TOTAL CA: CPT | Performed by: STUDENT IN AN ORGANIZED HEALTH CARE EDUCATION/TRAINING PROGRAM

## 2021-01-26 PROCEDURE — 250N000011 HC RX IP 250 OP 636: Performed by: PHYSICIAN ASSISTANT

## 2021-01-26 PROCEDURE — 250N000013 HC RX MED GY IP 250 OP 250 PS 637: Performed by: STUDENT IN AN ORGANIZED HEALTH CARE EDUCATION/TRAINING PROGRAM

## 2021-01-26 PROCEDURE — 120N000002 HC R&B MED SURG/OB UMMC

## 2021-01-26 PROCEDURE — 250N000013 HC RX MED GY IP 250 OP 250 PS 637: Performed by: PHYSICIAN ASSISTANT

## 2021-01-26 PROCEDURE — 36415 COLL VENOUS BLD VENIPUNCTURE: CPT | Performed by: STUDENT IN AN ORGANIZED HEALTH CARE EDUCATION/TRAINING PROGRAM

## 2021-01-26 PROCEDURE — 250N000013 HC RX MED GY IP 250 OP 250 PS 637: Performed by: UROLOGY

## 2021-01-26 RX ORDER — DEXTROSE MONOHYDRATE, SODIUM CHLORIDE, AND POTASSIUM CHLORIDE 50; 1.49; 4.5 G/1000ML; G/1000ML; G/1000ML
INJECTION, SOLUTION INTRAVENOUS
Status: DISPENSED
Start: 2021-01-26 | End: 2021-01-26

## 2021-01-26 RX ORDER — MAGNESIUM CARB/ALUMINUM HYDROX 105-160MG
148 TABLET,CHEWABLE ORAL ONCE
Status: COMPLETED | OUTPATIENT
Start: 2021-01-26 | End: 2021-01-26

## 2021-01-26 RX ORDER — HYDROMORPHONE HCL IN WATER/PF 6 MG/30 ML
0.2 PATIENT CONTROLLED ANALGESIA SYRINGE INTRAVENOUS 2 TIMES DAILY
Status: DISCONTINUED | OUTPATIENT
Start: 2021-01-26 | End: 2021-01-28 | Stop reason: HOSPADM

## 2021-01-26 RX ORDER — BISACODYL 10 MG
10 SUPPOSITORY, RECTAL RECTAL DAILY
Status: COMPLETED | OUTPATIENT
Start: 2021-01-26 | End: 2021-01-26

## 2021-01-26 RX ADMIN — Medication 400 MG: at 08:16

## 2021-01-26 RX ADMIN — HEPARIN SODIUM 5000 UNITS: 5000 INJECTION, SOLUTION INTRAVENOUS; SUBCUTANEOUS at 12:00

## 2021-01-26 RX ADMIN — MAGNESIUM CITRATE 148 ML: 1.75 LIQUID ORAL at 19:38

## 2021-01-26 RX ADMIN — HEPARIN SODIUM 5000 UNITS: 5000 INJECTION, SOLUTION INTRAVENOUS; SUBCUTANEOUS at 18:05

## 2021-01-26 RX ADMIN — Medication 400 MG: at 19:39

## 2021-01-26 RX ADMIN — HEPARIN SODIUM 5000 UNITS: 5000 INJECTION, SOLUTION INTRAVENOUS; SUBCUTANEOUS at 02:14

## 2021-01-26 RX ADMIN — HYDROMORPHONE HYDROCHLORIDE 4 MG: 2 TABLET ORAL at 16:43

## 2021-01-26 RX ADMIN — BISACODYL 10 MG: 10 SUPPOSITORY RECTAL at 21:31

## 2021-01-26 RX ADMIN — HYDROMORPHONE HYDROCHLORIDE 4 MG: 2 TABLET ORAL at 02:14

## 2021-01-26 RX ADMIN — HYDROMORPHONE HYDROCHLORIDE 0.2 MG: 0.2 INJECTION, SOLUTION INTRAMUSCULAR; INTRAVENOUS; SUBCUTANEOUS at 19:40

## 2021-01-26 RX ADMIN — POLYETHYLENE GLYCOL 3350 17 G: 17 POWDER, FOR SOLUTION ORAL at 08:15

## 2021-01-26 ASSESSMENT — ACTIVITIES OF DAILY LIVING (ADL)
ADLS_ACUITY_SCORE: 16

## 2021-01-26 NOTE — PROVIDER NOTIFICATION
Provider notified of increased temperature of 101.0 and clear green emesis 200 mL. Nurse administered IV Zofran and will continue to monitor.

## 2021-01-26 NOTE — PLAN OF CARE
POD 7  Activity: assist, stand by  Neuros:alert and oriented x 4  Cardiac:tachycardic  Respiratory:room air  GI/:LBM 1/25, voiding - need urine speciman  Diet: NPO  Skin:staples abdomen FAVIOLA  Lines/Drains: PIV  Plan: patient had emesis this shift, service notified. Fluids started, patient made NPO, chest xray.    Will continue to monitor

## 2021-01-26 NOTE — PLAN OF CARE
Vital signs:  Temp: 99.5  F (37.5  C) Temp src: Oral BP: 126/72 Pulse: 106   Resp: 12 SpO2: 96 % O2 Device: None (Room air)     Activity: Up independently.   Neuros: A&O x4.   Cardiac: WDL ex tachy with HR low 100s.   Respiratory: WDL on RA. Denies SOB.  GI/: Voiding spontaneously. +BS, -BM.  Diet: NPO ex meds.  Lines: Left PIV infusing IVMF.  Incisions/Drains: Abdominal incision with staples, FAVIOLA.  Pain/nausea: PO dilaudid given x1. Denies nausea.

## 2021-01-26 NOTE — PLAN OF CARE
Vitals:    01/25/21 1841 01/25/21 2017 01/25/21 2215 01/26/21 0835   BP:  (!) 143/85 126/72 114/62   BP Location:  Right arm Right arm Right arm   Pulse:  112 106 97   Resp:  16 12 16   Temp:  101  F (38.3  C) 99.5  F (37.5  C) 99.6  F (37.6  C)   TempSrc:  Oral Oral Oral   SpO2:  99% 96% 100%   Weight: 63.8 kg (140 lb 11.2 oz)      Height:       AVSS.    Neuro: A & & x4.     GI/: WDL.    Diet: Low fiber low fat diet. Poor po intake. Had chicken noodle soup x1. No c/o nausea.     Incisions/Drains: abdominal incision with staples and FAVIOLA.    IV Access: PIV - sl.    Labs: Results as of 1/26/2021 14:40   Ref. Range 1/26/2021 05:54   Sodium Latest Ref Range: 133 - 144 mmol/L 137   Potassium Latest Ref Range: 3.4 - 5.3 mmol/L 4.1   Chloride Latest Ref Range: 94 - 109 mmol/L 106   Carbon Dioxide Latest Ref Range: 20 - 32 mmol/L 26   Urea Nitrogen Latest Ref Range: 7 - 30 mg/dL 9   Creatinine Latest Ref Range: 0.66 - 1.25 mg/dL 1.25   GFR Estimate Latest Ref Range: >60 mL/min/1.73_m2 78   GFR Estimate If Black Latest Ref Range: >60 mL/min/1.73_m2 >90   Calcium Latest Ref Range: 8.5 - 10.1 mg/dL 8.9   Anion Gap Latest Ref Range: 3 - 14 mmol/L 5   Glucose Latest Ref Range: 70 - 99 mg/dL 110 (H)   WBC Latest Ref Range: 4.0 - 11.0 10e9/L 7.0   Hemoglobin Latest Ref Range: 13.3 - 17.7 g/dL 7.9 (L)   Hematocrit Latest Ref Range: 40.0 - 53.0 % 24.0 (L)   Platelet Count Latest Ref Range: 150 - 450 10e9/L 230   RBC Count Latest Ref Range: 4.4 - 5.9 10e12/L 2.56 (L)   MCV Latest Ref Range: 78 - 100 fl 94   MCH Latest Ref Range: 26.5 - 33.0 pg 30.9   MCHC Latest Ref Range: 31.5 - 36.5 g/dL 32.9   RDW Latest Ref Range: 10.0 - 15.0 % 15.5 (H)       Activity: Independent. Stays in bed most of shift in spite of encouragements to walk.     Pain: No c/o pain today.    New changes this shift: none.    Plan: Possible discharge home this afternoon.

## 2021-01-26 NOTE — PROGRESS NOTES
"Urology Daily Progress Note    24 hour events/Subjective:     - Tmax 101, vital signs stable. Workup unremarkable    - Episode of emesis last night, made NPO   - Fluids restarted last night   - Poor PO intake per RN   - 3 BM yesterday, passing gas   - burping a little bit   - had a cracker and some soup yesterday    O:  Vitals: /72 (BP Location: Right arm)   Pulse 106   Temp 99.5  F (37.5  C) (Oral)   Resp 12   Ht 1.702 m (5' 7.01\")   Wt 63.8 kg (140 lb 11.2 oz)   SpO2 96%   BMI 22.03 kg/m      General: Alert, interactive, in NAD  Resp: Non-labored breathing on RA  Abdomen: Soft, non-tender, non-distended, midline incision CDI with staples, small amount of leakage at inferior aspect, unable to express additional fluid  Ext: Warm and well perfused, no edema     I/O   and umeasured x2/NR    Labs    Heme:  Recent Labs   Lab 01/25/21  2123 01/25/21  0549 01/24/21  0702 01/23/21  0706   WBC 9.1 7.7 6.0 6.1   HGB 8.3* 8.3* 7.6* 7.8*    215 187 171     Chem:  Recent Labs   Lab 01/25/21  0549 01/24/21  0702 01/23/21  0706 01/22/21  0627    137 139 143   POTASSIUM 4.3 3.7 3.8 3.8   CR 1.25 1.22 1.37* 1.52*       Assessment/Plan  25 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 Lidocaine patches today, encourage oral pain meds.   CV Normotensive, tachycardia improving   PULM Aggressive pulmonary toilet and I/S.   FEN/GI IVF: discontinue  Diet: low fiber low fat   Bowel regimen: None  Nutrition consult, discuss supplementation given lactose intolerance    Voiding spontaneously   HEME Hgb as above.    ID Afebrile, " no leukocytosis.    Antibiotics: Completed periop antibiotics    ENDO No issues   ACTIVITY Up as tolerated    PPx SCDs, ambulation, Heparin 5000 TID   HOME RX ON HOLD Amlodipine, dexamethasone, oxycodone, miralax, compazine, zofran   DISPO Floor, would like to go home today    PT/OT recommendations: No acute skilled PT needs.     Seen and examined with chief resident, to be discussed with Dr. Major    --  Alona Salcido MD  PGY-2 Urology

## 2021-01-26 NOTE — PLAN OF CARE
Pt reports having BM this am.   Declined dulcolax SD this am.  Still c/o poor appetite. No c/o nausea.  Encouraging per to stay OOB and to ambulate.  Continue with current POC.

## 2021-01-26 NOTE — PROGRESS NOTES
"Colorectal Surgery Progress Note     POD 8 s/p resection of proximal jejunum and L colectomy with resection of L retroperitoneal mass and L nephrectomy     Subjective:   Pain well controlled on current regimen. Ambulating. Tolerating diet, but emesis x 1 when attempting to swallow large pills. Denies nausea/emesis otherwise. Stating that he will eat more as his diet advances. 1 BM yesterday.    Physical Exam:  /72 (BP Location: Right arm)   Pulse 106   Temp 99.5  F (37.5  C) (Oral)   Resp 12   Ht 1.702 m (5' 7.01\")   Wt 63.8 kg (140 lb 11.2 oz)   SpO2 96%   BMI 22.03 kg/m      No acute distress  Alert and oriented   Breathing comfortably on room air  Abdomen is soft, non-distended, no tenderness to palpation. Mid abdominal incision clean and dry with staples intact. No drainage noted  No lower extremity edema, warm and well perfused    ASSESSMENT/PLAN:      Cyrus Huertas Jr. 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 He is progressing well with one BM on 1/27. KUB obtained and medium colonic stool burden seen.     -Suppository this morning  -Continue low fiber/low fat diet per urology  -Pain control PRN. Muscle relaxer PRN.   -Encourage ambulation.  -Primary cares per urology    Patient seen and discussed with colorectal fellow, Dr. Perez, and discussed with staff.    Davy Ba'th, MS3  Colorectal Service    I, Farida Mena MD, saw the patient with the medical student and have edited the note to reflect our combined findings. I agree with the history, exam, assessment and plan as outlined above and have discussed this patient with the colorectal fellow who will " discuss with staff.     Farida Mena  General Surgery Resident, PGY-1

## 2021-01-27 ENCOUNTER — PRE VISIT (OUTPATIENT)
Dept: UROLOGY | Facility: CLINIC | Age: 26
End: 2021-01-27

## 2021-01-27 ENCOUNTER — APPOINTMENT (OUTPATIENT)
Dept: CT IMAGING | Facility: CLINIC | Age: 26
End: 2021-01-27
Attending: UROLOGY
Payer: COMMERCIAL

## 2021-01-27 LAB
ANION GAP SERPL CALCULATED.3IONS-SCNC: 8 MMOL/L (ref 3–14)
BUN SERPL-MCNC: 13 MG/DL (ref 7–30)
CALCIUM SERPL-MCNC: 9.4 MG/DL (ref 8.5–10.1)
CHLORIDE SERPL-SCNC: 103 MMOL/L (ref 94–109)
CO2 SERPL-SCNC: 25 MMOL/L (ref 20–32)
CREAT SERPL-MCNC: 1.3 MG/DL (ref 0.66–1.25)
ERYTHROCYTE [DISTWIDTH] IN BLOOD BY AUTOMATED COUNT: 15.3 % (ref 10–15)
GFR SERPL CREATININE-BSD FRML MDRD: 74 ML/MIN/{1.73_M2}
GLUCOSE SERPL-MCNC: 91 MG/DL (ref 70–99)
HCT VFR BLD AUTO: 26.7 % (ref 40–53)
HGB BLD-MCNC: 8.7 G/DL (ref 13.3–17.7)
MCH RBC QN AUTO: 30.3 PG (ref 26.5–33)
MCHC RBC AUTO-ENTMCNC: 32.6 G/DL (ref 31.5–36.5)
MCV RBC AUTO: 93 FL (ref 78–100)
PLATELET # BLD AUTO: 271 10E9/L (ref 150–450)
POTASSIUM SERPL-SCNC: 4.4 MMOL/L (ref 3.4–5.3)
RBC # BLD AUTO: 2.87 10E12/L (ref 4.4–5.9)
SODIUM SERPL-SCNC: 135 MMOL/L (ref 133–144)
WBC # BLD AUTO: 9.7 10E9/L (ref 4–11)

## 2021-01-27 PROCEDURE — 74177 CT ABD & PELVIS W/CONTRAST: CPT | Mod: 26 | Performed by: RADIOLOGY

## 2021-01-27 PROCEDURE — 250N000013 HC RX MED GY IP 250 OP 250 PS 637: Performed by: STUDENT IN AN ORGANIZED HEALTH CARE EDUCATION/TRAINING PROGRAM

## 2021-01-27 PROCEDURE — 74177 CT ABD & PELVIS W/CONTRAST: CPT

## 2021-01-27 PROCEDURE — 80048 BASIC METABOLIC PNL TOTAL CA: CPT | Performed by: STUDENT IN AN ORGANIZED HEALTH CARE EDUCATION/TRAINING PROGRAM

## 2021-01-27 PROCEDURE — 250N000011 HC RX IP 250 OP 636: Performed by: STUDENT IN AN ORGANIZED HEALTH CARE EDUCATION/TRAINING PROGRAM

## 2021-01-27 PROCEDURE — 250N000013 HC RX MED GY IP 250 OP 250 PS 637: Performed by: UROLOGY

## 2021-01-27 PROCEDURE — 85027 COMPLETE CBC AUTOMATED: CPT | Performed by: STUDENT IN AN ORGANIZED HEALTH CARE EDUCATION/TRAINING PROGRAM

## 2021-01-27 PROCEDURE — 250N000011 HC RX IP 250 OP 636: Performed by: PHYSICIAN ASSISTANT

## 2021-01-27 PROCEDURE — 999N000128 HC STATISTIC PERIPHERAL IV START W/O US GUIDANCE

## 2021-01-27 PROCEDURE — 36415 COLL VENOUS BLD VENIPUNCTURE: CPT | Performed by: STUDENT IN AN ORGANIZED HEALTH CARE EDUCATION/TRAINING PROGRAM

## 2021-01-27 PROCEDURE — 250N000013 HC RX MED GY IP 250 OP 250 PS 637: Performed by: PHYSICIAN ASSISTANT

## 2021-01-27 PROCEDURE — 120N000002 HC R&B MED SURG/OB UMMC

## 2021-01-27 RX ORDER — LIDOCAINE 4 G/G
2 PATCH TOPICAL EVERY 24 HOURS
Qty: 20 PATCH | Refills: 0 | Status: SHIPPED | OUTPATIENT
Start: 2021-01-27 | End: 2021-05-10

## 2021-01-27 RX ORDER — POLYETHYLENE GLYCOL 3350 17 G/17G
1 POWDER, FOR SOLUTION ORAL DAILY
Qty: 578 G | Refills: 1 | Status: SHIPPED | OUTPATIENT
Start: 2021-01-27 | End: 2021-08-27

## 2021-01-27 RX ORDER — ONDANSETRON 4 MG/1
4 TABLET, FILM COATED ORAL EVERY 6 HOURS PRN
Qty: 16 TABLET | Refills: 0 | Status: SHIPPED | OUTPATIENT
Start: 2021-01-27 | End: 2021-05-10

## 2021-01-27 RX ORDER — HYDROMORPHONE HYDROCHLORIDE 2 MG/1
2 TABLET ORAL EVERY 4 HOURS PRN
Qty: 16 TABLET | Refills: 0 | Status: SHIPPED | OUTPATIENT
Start: 2021-01-27 | End: 2021-08-27

## 2021-01-27 RX ORDER — ACETAMINOPHEN 325 MG/1
650 TABLET ORAL EVERY 6 HOURS PRN
Qty: 100 TABLET | Refills: 0 | Status: SHIPPED | OUTPATIENT
Start: 2021-01-27 | End: 2021-05-10

## 2021-01-27 RX ORDER — AMLODIPINE BESYLATE 5 MG/1
5 TABLET ORAL DAILY
Qty: 30 TABLET | Refills: 0 | COMMUNITY
Start: 2021-01-27 | End: 2021-05-10

## 2021-01-27 RX ORDER — METHOCARBAMOL 500 MG/1
500 TABLET, FILM COATED ORAL 4 TIMES DAILY PRN
Qty: 20 TABLET | Refills: 0 | Status: SHIPPED | OUTPATIENT
Start: 2021-01-27 | End: 2021-08-27

## 2021-01-27 RX ORDER — IOPAMIDOL 755 MG/ML
86 INJECTION, SOLUTION INTRAVASCULAR ONCE
Status: COMPLETED | OUTPATIENT
Start: 2021-01-27 | End: 2021-01-27

## 2021-01-27 RX ADMIN — Medication 400 MG: at 08:37

## 2021-01-27 RX ADMIN — HYDROMORPHONE HYDROCHLORIDE 4 MG: 2 TABLET ORAL at 01:11

## 2021-01-27 RX ADMIN — HEPARIN SODIUM 5000 UNITS: 5000 INJECTION, SOLUTION INTRAVENOUS; SUBCUTANEOUS at 19:42

## 2021-01-27 RX ADMIN — HEPARIN SODIUM 5000 UNITS: 5000 INJECTION, SOLUTION INTRAVENOUS; SUBCUTANEOUS at 02:37

## 2021-01-27 RX ADMIN — HYDROMORPHONE HYDROCHLORIDE 4 MG: 2 TABLET ORAL at 13:30

## 2021-01-27 RX ADMIN — ACETAMINOPHEN 975 MG: 325 TABLET, FILM COATED ORAL at 01:11

## 2021-01-27 RX ADMIN — ONDANSETRON 4 MG: 2 INJECTION INTRAMUSCULAR; INTRAVENOUS at 20:01

## 2021-01-27 RX ADMIN — SODIUM PHOSPHATE 1 ENEMA: 7; 19 ENEMA RECTAL at 16:42

## 2021-01-27 RX ADMIN — LIDOCAINE 2 PATCH: 560 PATCH PERCUTANEOUS; TOPICAL; TRANSDERMAL at 08:40

## 2021-01-27 RX ADMIN — HYDROMORPHONE HYDROCHLORIDE 0.2 MG: 0.2 INJECTION, SOLUTION INTRAMUSCULAR; INTRAVENOUS; SUBCUTANEOUS at 19:42

## 2021-01-27 RX ADMIN — IOPAMIDOL 86 ML: 755 INJECTION, SOLUTION INTRAVENOUS at 13:17

## 2021-01-27 ASSESSMENT — ACTIVITIES OF DAILY LIVING (ADL)
ADLS_ACUITY_SCORE: 16

## 2021-01-27 ASSESSMENT — MIFFLIN-ST. JEOR: SCORE: 1552.91

## 2021-01-27 NOTE — PLAN OF CARE
"/63 (BP Location: Right arm)   Pulse 102   Temp 98.8  F (37.1  C) (Oral)   Resp 14   Ht 1.702 m (5' 7.01\")   Wt 63.8 kg (140 lb 11.2 oz)   SpO2 99%   BMI 22.03 kg/m      Reason for admission: 1/18- resection of proximal jejunum and L colectomy with resection of L retroperitoneal mass and L nephrectomy   Neuro: A&Ox4, calls appropriately, temp 100.5 max, 0500- 98.8  Cardiac: ex tachy, no chest pain  Respiratory: WDL, RA, no SOB  GI/: voiding adequately, BM x1 this shift, loose/large  Pain: given dilaudid x1, tylenol for fever x1  Lines: L PIV-SL  Drains: X  Incisions: midline- sutured/ FAVIOLA  Activity: up ad jacy  Diet: Low fiber, low fat  Labs: hgb- 7.9  Changes/Plan: possible discharge later today, continue POC    "

## 2021-01-27 NOTE — PROGRESS NOTES
"Urology Daily Progress Note    24 hour events/Subjective:     - Tmax 100.5, improved   - Feels welll   - No nausea, vomiting, chest pain, SOB   - Wants to go home    O:  Vitals: /63 (BP Location: Right arm)   Pulse 102   Temp 98.8  F (37.1  C) (Oral)   Resp 14   Ht 1.702 m (5' 7.01\")   Wt 63.8 kg (140 lb 11.2 oz)   SpO2 99%   BMI 22.03 kg/m      General: Alert, interactive, in NAD  Resp: Non-labored breathing on RA  Abdomen: Soft, non-tender, non-distended, midline incision CDI with staples,   Ext: Warm and well perfused, no edema     I/O  /NR  Emesis x1/NR  Stool x1/x1    Labs    Heme:  Recent Labs   Lab 01/26/21  0554 01/25/21  2123 01/25/21  0549 01/24/21  0702   WBC 7.0 9.1 7.7 6.0   HGB 7.9* 8.3* 8.3* 7.6*    239 215 187     Chem:  Recent Labs   Lab 01/26/21  0554 01/25/21  0549 01/24/21  0702 01/23/21  0706    137 137 139   POTASSIUM 4.1 4.3 3.7 3.8   CR 1.25 1.25 1.22 1.37*       Assessment/Plan  25 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 Lidocaine patches today, encourage oral pain meds.   CV Normotensive, tachycardia improving   PULM Aggressive pulmonary toilet and I/S.   FEN/GI IVF: discontinue  Diet: low fiber low fat   Bowel regimen: None  Nutrition consult    Voiding spontaneously   HEME Hgb as above.    ID Afebrile, no leukocytosis.    Antibiotics: Completed periop antibiotics    ENDO No issues   ACTIVITY Up as tolerated    PPx SCDs, ambulation, Heparin 5000 TID   HOME RX ON HOLD Amlodipine, dexamethasone, oxycodone, miralax, compazine, zofran   DISPO Floor, would like to go home " today  Will need to check timing staple removal.  PT/OT recommendations: No acute skilled PT needs.     Seen and examined with chief resident, to be discussed with Dr. Major    --  Alona Salcido MD  PGY-2 Urology

## 2021-01-27 NOTE — PLAN OF CARE
Vitals:    01/26/21 2302 01/27/21 0500 01/27/21 0819 01/27/21 1204   BP: 116/63  102/61 117/75   BP Location: Right arm  Right arm Right arm   Pulse: 102  90 98   Resp: 14  16 16   Temp: 100.5  F (38.1  C) 98.8  F (37.1  C) 96.3  F (35.7  C) 97  F (36.1  C)   TempSrc: Oral Oral Axillary Axillary   SpO2: 99%  98% 100%   Weight:       Height:       AVSS. No fever this shift.    Neuro: Alert and oriented x4.    GI/: WDL.    Diet: Low fat low fiber diet. Taking clears only. Poor intake of solids. No c/o nausea. Np emesis.     Incisions/Drains: Abdominal incision with staples and FAVIOLA.    IV Access: PIV new placed for CT at 1300.    Labs: Results as of 1/27/2021 13:33   Ref. Range 1/27/2021 06:26   Sodium Latest Ref Range: 133 - 144 mmol/L 135   Potassium Latest Ref Range: 3.4 - 5.3 mmol/L 4.4   Chloride Latest Ref Range: 94 - 109 mmol/L 103   Carbon Dioxide Latest Ref Range: 20 - 32 mmol/L 25   Urea Nitrogen Latest Ref Range: 7 - 30 mg/dL 13   Creatinine Latest Ref Range: 0.66 - 1.25 mg/dL 1.30 (H)   GFR Estimate Latest Ref Range: >60 mL/min/1.73_m2 74   GFR Estimate If Black Latest Ref Range: >60 mL/min/1.73_m2 86   Calcium Latest Ref Range: 8.5 - 10.1 mg/dL 9.4   Anion Gap Latest Ref Range: 3 - 14 mmol/L 8   Glucose Latest Ref Range: 70 - 99 mg/dL 91   WBC Latest Ref Range: 4.0 - 11.0 10e9/L 9.7   Hemoglobin Latest Ref Range: 13.3 - 17.7 g/dL 8.7 (L)   Hematocrit Latest Ref Range: 40.0 - 53.0 % 26.7 (L)   Platelet Count Latest Ref Range: 150 - 450 10e9/L 271   RBC Count Latest Ref Range: 4.4 - 5.9 10e12/L 2.87 (L)   MCV Latest Ref Range: 78 - 100 fl 93   MCH Latest Ref Range: 26.5 - 33.0 pg 30.3   MCHC Latest Ref Range: 31.5 - 36.5 g/dL 32.6   RDW Latest Ref Range: 10.0 - 15.0 % 15.3 (H)       Activity: Up independently. Walked on unit.    Pain: Hydromorphone po 4mg x 1 at 1330.    New changes this shift: None.     Plan: CT this afternoon done at 1300. Possible discharge home today if cleared medically after CT.

## 2021-01-27 NOTE — DISCHARGE SUMMARY
[facility] UroDischarge Summary    Patient: Cyrus Huertas Jr.    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] (Path: residual yolk sac tumor (0.6 cm and 0.3 cm foci) and small scattered foci of teratoma elements with 2/25 lymph nodes showing residual teratoma elements)     - Hyperkalemia - resolved  - Acute kidney injury - resolved  - Acute blood loss anemia, expected secondary to surgery, requiring transfusion  - Fevers    Past Medical History:   Diagnosis Date     Anemia      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 Exploratory laparotomy  2. Resection of L retroperitoneal mass with en block L nephrectomy and L colectomy  3. Manual disimpaction, complex  4. Flexible sigmoidoscopy, washout of rectal stump  5. Stapled side-to-side, functional end-to-end transverse to sigmoid colon anastomosis  6. Resection of segment of proximal jejunum, with hand-sewn side-to-side, functional end-to-end anastomosis  Ruma Castro MD (Colorectal  Surgery)                Medications Prior to Admission:     Medications Prior to Admission   Medication Sig Dispense Refill Last Dose     NO ACTIVE MEDICATIONS .   More than a month at Unknown time     prochlorperazine (COMPAZINE) 10 MG tablet    More than a month at Unknown time     [DISCONTINUED] amLODIPine (NORVASC) 5 MG tablet    More than a month at Unknown time     [DISCONTINUED] dexamethasone (DECADRON) 4 MG tablet    More than a month at Unknown time     [DISCONTINUED] ondansetron (ZOFRAN) 4 MG tablet    More than a month at Unknown time     [DISCONTINUED] ondansetron (ZOFRAN) 8 MG tablet    More than a month at Unknown time     [DISCONTINUED] oxyCODONE (ROXICODONE) 5 MG tablet    More than a month at Unknown time     [DISCONTINUED] polyethylene glycol (MIRALAX) 17 GM/Dose powder    More than a month at Unknown time             Discharge Medications:     Current Discharge Medication List      START taking these medications    Details   acetaminophen (TYLENOL) 325 MG tablet Take 2 tablets (650 mg) by mouth every 6 hours as needed for fever or pain  Qty: 100 tablet, Refills: 0    Associated Diagnoses: 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)      methocarbamol (ROBAXIN) 500 MG tablet Take 1 tablet (500 mg) by mouth 4 times daily as needed for muscle spasms (abdominal pain)  Qty: 20 tablet, Refills: 0    Associated Diagnoses: Malignant neoplasm of  testis metastatic to intra-abdominal lymph node (H)         CONTINUE these medications which have CHANGED    Details   amLODIPine (NORVASC) 5 MG tablet Take 1 tablet (5 mg) by mouth daily (HOLD MEDICATION UNTIL FOLLOWUP with PCP)  Qty: 30 tablet, Refills: 0      ondansetron (ZOFRAN) 4 MG tablet Take 1 tablet (4 mg) by mouth every 6 hours as needed for nausea  Qty: 16 tablet, Refills: 0    Associated Diagnoses: 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      prochlorperazine (COMPAZINE) 10 MG tablet          STOP taking these medications       dexamethasone (DECADRON) 4 MG tablet Comments:   Reason for Stopping:         oxyCODONE (ROXICODONE) 5 MG tablet Comments:   Reason for Stopping:                     Consultations:   Consultation during this admission received:   PHYSICAL THERAPY ADULT IP CONSULT  OCCUPATIONAL THERAPY ADULT IP CONSULT  NUTRITION SERVICES ADULT IP CONSULT  VASCULAR ACCESS CARE ADULT IP CONSULT  CARE MANAGEMENT / SOCIAL WORK IP CONSULT  VASCULAR ACCESS CARE ADULT IP CONSULT          Brief History of Illness:   Reason for admission requiring a surgical or invasive procedure:   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    Please refer to the full operative summary for details.           Hospital Course:   The patient's hospital course was remarkable for:    # hyperkalemia  # Acute kidney injury  Postop admission to the SICU for tachycardia (130's - 140's) and hypotension  on POD#0. Fluid resuscitation was provided.  A central line was placed, but vasopressors were never needed. On the morning of POD#1 his vitals were stabilizing but the patient was hyperkalemic (5.7mmoL/L)and had developed an CHRISTIE (sCr 1.88mg/dL up from baseline 1.3-1.4mg/dL). These both resolved with medical management and he was able to transfer out of the ICU to floor status on POD#1.     # Acute blood loss anemia, expected secondary to surgery, requiring transfusion  On POD#2, hemoglobin dropped to 7.6g/dL from previous value of ~10.5g/dL preop.  On POD#3, there was further downtrending (7.1g/dL) and on POD#4 Cyrus was given 1u PRBCs for a hemoglobin of 6.4g/dL.  Hemoglobin rebounded to 7.6g/dL following transfusion and remained stable throughout the remainder of the hospital stay (8.7g/dL prior to discharge).     Colorectal Surgery followed and assisted with GI mgmt. Cyrus was advanced to clears on POD#2 but had an episode of emesis later that evening and was returned to NPO. A KUB the next day showed an nonobstructive bowel pattern and on POD#4 he was again given clear liquids. Thereafter, he was advanced to a low fiber diet within a couple days.  But he was very cautious with PO, and had a small emesis on POD#8.  CRS recommended a bowel regimen including suppositories and eventually mag citrate then Fleet enemas.    # Fever  On POD#7, he developed one fever of 101F. This was evaluated with a UA that was negative.  A chest xray showed atelectasis and possible pneumoperitoneum.  He had no leukocytosis at the time.  No antibiotics were begun.  Over the next couple days, he had low grade fevers (Tmax 100.5) thus a CT abdomen and pelvis with contrast was obtained.  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  Images were reviewed by both Urology and CRS with both teams recommending discharge at this point.  The formal read stated:    IMPRESSION:  1.  Patient is status post resection of a large para-aortic mass  with  radical left nephrectomy, partial colectomy and partial small bowel  resection.  2.  There is a small volume of pneumoperitoneum present which is  greater than would be expected for postoperative day 9 but could still  be within normal limits (usually resolved by Postop day 7-10).  3.  Moderate volume of fluid within the abdomen and pelvis which  appears loculated and partially rim-enhancing in several locations as  discussed above. There is some layering high density material within  some of these including the collection along the right paracolic  gutter and anteriorly within the pelvis. Differential considerations  would include postoperative liquefying hematoma/hemoperitoneum  although debris from peritonitis with infected collections could also  have this appearance and superimposed infection cannot be excluded on  imaging given patient's symptoms. The fluid within the left  nephrectomy bed contains a few foci of air but does not appear to be  significantly rim enhancing to suggest an abscess. Collections do not  appear to be centered adjacent to the bowel anastomotic sites.  4.  Mild postoperative ileus suspected. Gas and stool throughout the  colon demonstrated with a large stool ball in the rectum. As discussed  previously, there is some wall thickening of the rectum which is  nonspecific but can be seen with stercoral colitis given the size of  the stool ball and degree of wall thickening. Correlation with any  symptoms.  5.  Trace left pleural effusion. Bibasilar atelectasis.  6.  Bladder wall appears diffusely thickened although may be related  to degree of distention. Patient does not reportedly have any urinary  symptoms to suggest cystitis.     [Results: Above findings pertaining to the postoperative collections]     Finding was identified on 1/27/2021 2:07 PM.      Dr. Mena was contacted by Dr. Robbins at 1/27/2021 2:00 PM and  verbalized understanding of the above finding.     BETO WILLIAMSON  MD ERIKA      On POD #10 Cyrus was ambulating without assitance, tolerating the discharge diet, had pain controlled with PO medications to go home with, and was requiring no IV medications or fluids. He was discharged to home with appropriate contact information, follow-up and instructions as seen below in the discharge paperwork.         Discharge Labs:     No results found for: PSA  Recent Labs   Lab 01/27/21  0626 01/26/21  0554   WBC 9.7 7.0   HGB 8.7* 7.9*    230     Recent Labs   Lab 01/27/21  0626 01/26/21  0554 01/25/21  0549 01/22/21  0627 01/22/21  0627    137 137   < > 143   POTASSIUM 4.4 4.1 4.3   < > 3.8   CHLORIDE 103 106 106   < > 113*   CO2 25 26 25   < > 24   BUN 13 9 7   < > 15   CR 1.30* 1.25 1.25   < > 1.52*   GLC 91 110* 99   < > 85   GAYE 9.4 8.9 9.2   < > 8.4*   MAG  --   --  1.9  --  1.8   PHOS  --   --   --   --  2.8    < > = values in this interval not displayed.     Recent Labs   Lab 01/25/21  2320   COLOR Light Yellow   APPEARANCE Clear   URINEGLC Negative   URINEBILI Negative   URINEKETONE Negative   SG 1.005   URINEPH 6.5   PROTEIN 10*   NITRITE Negative   LEUKEST Negative   RBCU <1   WBCU 1     No results found for this or any previous visit.         Discharge Instructions and Follow-Up:    Diet:  - Drink plenty of fluids to maintain hydration (typically 2L per day OR eight 8oz glasses of water)   - Low residue / low fiber diet  - Low fat diet (< 20 grams per day for 30 days following surgery, until 2/17/21)   - Eat small frequent meals.  Use Boost/Ensure protein drinks to optimize calorie intake    Activity:   - No strenuous exercise for 5 weeks.   - No lifting, pushing, pulling more than 10 pounds for 5 weeks. Take care when pushing with your arms to stand up.  - Do not strain your belly area.  When you bend, sit up or twice, you could strain the area around your incision.    - Do not strain with bowel movements.    - Do not drive until you can press the brake pedal  quickly and fully without pain.   - Do not operate a motor vehicle while taking narcotic pain medications.     Medications:   1) PAIN: hydromorphone is a narcotic medication that has been prescribed for pain.  Narcotics will cause sleepiness and constipation and can become addictive, therefore it is best to stop or reduce them as soon as you can.  Any left over narcotics should be disposed of with an Authorized  for unneeded medications.  Contact your [facility]'s or [facility]'s household trash and recycling service to learn about medication disposal options and guidelines for your area.  If you decide to store this medication at home it should be kept in a locked cabinet to prevent access to children or visitors. To reduce your narcotic use, take Tylenol (acetaminophen 625mg) as directed.  This has been prescribed for you.  Do not take more than 4,000mg of Tylenol (acetaminophen/ APAP) from all sources in any 24 hour period since this can cause liver damage.  Do not take more than 2400mg of ibuprofen in any 24 hour period since this can cause kidney damage. Never drive, operate machinery or drink alcoholic beverages while you are taking narcotic pain medications.      2) CONSTIPATION: Miralax (polyethylene glycol) can be taken up to twice daily for prevention of constipation since surgery, pain medications and bladder spasm medications can all make you constipated.  Please reduce or stop Miralax if you develop loose stools. Other over the counter solutions such as prune juice, miralax, fiber products, senna, and dulcolax can also be used. If you are taking the Miralax but still have not had a bowel movement in 3 days, start over-the-counter Milk of Magnesia taken twice daily until you have a good result  Call the office with any concerns.     Incisions:   - Daily showering is important, and you may get incisions wet starting 48 hours after surgery.  - Do not scrub incisions or soak in a bath, pool, hot  "tub, etc. Until staples have been removed and wounds have fully healed (typically a month after surery)    - The wound dressing should be removed 48 hours after surgery.   - The purple skin glue on your incisions will flake off with time and should not be scrubbed.  Also avoid applying lotions or ointments to these areas.  - If steri-strips were used you may wash over them normally, as you would wash your remaining skin.  Steri-strips should fall off on their own within 5-10 days.   - Staples will be removed in Dr. Major's clinic next week  - It is preferable for the incision to be left uncovered, but you may cover with gauze if needed for comfort or to protect clothing from drainage.     Follow-Up:   - Schedule an appointment to be seen by your primary care provider within 7-10 days of discharge for a postoperative checkup.   - Follow up with Dr. Major as scheduled on 2/3/21 for a postop check, to remove staples and to discuss pathology  - Colorectal surgery will also be arranging followup for you over the next several weeks.   - Call or return sooner than your regularly scheduled visit if you develop any of the following:  Fever (greater than 101.3F), uncontrolled pain, uncontrolled nausea or vomiting, concerns about bowel function, as well as increased redness, swelling, drainage from your wound or any concerns about urinating or urinary catheter drainage.      Here are some phone numbers where you can reach us:  - Monday through Friday, 8am to 4:30pm - as long as it is not a holiday, IT IS BEST to call the Urology Clinic Triage Line at: [phone] with any non-emergent urology concerns.    - If it is a night, weekend, or holiday call the [facility] number at [phone] and ask the  to page the \"urology resident on call\".  Typically, the on-call provider should return your call within 30 minutes.  Please page the on-call provider again if you haven't been contacted as expected.  Rarely, the " "on-call provider will be unable to promptly return a call due to a hospital emergency.  If you have paged twice and are still not contacted, ask the hospital  to page the \"urology CHIEF-RESIDENT on call\".  - FOR EMERGENCIES, always call 911 or go to the Emergency Department    Routine, COLON AND RECTAL SURGERY DISCHARGE RECOMMENDATIONS:   DIET -Low Residue Diet for at least 4-6 weeks unless cleared by Colorectal surgery. No raw vegetables, fruit skins, fibrous foods that require a lot of chewing, nuts, seeds, corn, popcorn. -We recommend eating slowly, chewing thoroughly, eating small frequent meals throughout the day -Stay well hydrated.   ACTIVITY -Activity also per Urology team -No lifting, pushing, pulling greater than 10 lbs and no strenuous exercise for 6 weeks -No driving while on narcotic analgesics (i.e. Percocet, oxycodone, Vicodin) -No driving until you are able to fully twist to both sides or slam on brakes quickly and without any pain -We encourage walking at least 4-5 times per day   WOUND CARE -Inspect your wounds daily for signs of infection (increased redness, drainage, pain) -Keep your wound clean and dry -You may shower, but do not soak in tub or pool NOTIFY Please contact Keke Arteaga RN, Abbie Grace RN or Caleb Hanks LPN at [phone] for problems after discharge such as: -Temperature > 101F, chills, rigors, dizziness -Inability to tolerate diet, nausea or vomiting -You stop passing gas, develop significant bloating, abdominal pain -Have blood in stools/vomit -Have severe diarrhea/constipation -Any other questions or concerns. - At nights (after 4:30pm), on weekends, or if urgent, call [phone] and ask the  to speak with the on-call Colorectal Surgery resident or fellow Medication Instructions Some of your medications may have changed. Please take only prescribed and resumed medications FOLLOW-UP 1. You will need to follow-up with Anisa George NP in the " Colon and Rectal Surgery clinic in 2-3 week(s) and then with CRS Staff: Dr. Angela in 4-6 weeks after. Please contact our clinic scheduler (phone # [phone]) if you have not heard from our clinic in 3 business days afer discharge to schedule a follow-up appointment. Appointments on [facility] and/or [facility] (with [facility] or [facility] provider or service). Call [phone] if you haven't heard regarding these appointments within 7 days of discharge.           Discharge Disposition:     Discharged to home      Attestation: I have reviewed today's vital signs, notes, medications, labs and imaging.    Kasandra Avila PA-C  Urology Physician Assistant  Personal Pager: [phone]    Please call Job Code:   [phone] to reach the Urology resident or PA on call - Weekdays  [phone] to reach the Urology resident or PA on call - Weeknights and weekends    January 28, 2021

## 2021-01-27 NOTE — PLAN OF CARE
Admitted/transferred from: ED  2 RN full   skin assessment completed by Radha Bhardwaj, RN and Justino ROJAS RN.  Skin assessment finding: issues found two old biopsy sites back of right thigh - FAVIOLA/CDI    Interventions/actions: skin interventions educated patient to offload pressure.      Will continue to monitor.

## 2021-01-27 NOTE — TELEPHONE ENCOUNTER
Chief Complaint : Post op - L radical Nephrectomy, retroperitoneal lyphmadenectomy    Hx/Sx: Metastatic L testicular cancer    Records/Orders: Pathology in progress    Pt Contacted: N/a    At Rooming: Staple and possible ibarra removal    Bill Fox, EMT

## 2021-01-27 NOTE — PROGRESS NOTES
"Colorectal Surgery Progress Note     POD 9 s/p resection of proximal jejunum and L colectomy with resection of L retroperitoneal mass and L nephrectomy     Subjective:   Per patient, he feels well. Pain controlled and plans to increase PO intake with more food options on discharge. Eating soaps. No nausea. Emesis x 1 with mag citrate overnight.    Per chart, continues to spike fevers.    Physical Exam:  /61 (BP Location: Right arm)   Pulse 90   Temp 96.3  F (35.7  C) (Axillary)   Resp 16   Ht 1.702 m (5' 7.01\")   Wt 63.8 kg (140 lb 11.2 oz)   SpO2 98%   BMI 22.03 kg/m      No acute distress  Alert and oriented   Breathing comfortably on room air  Abdomen is soft, non-distended, no tenderness to palpation. Mid abdominal incision clean and dry with staples intact. No drainage noted  No lower extremity edema, warm and well perfused    ASSESSMENT/PLAN:      Cyrus Huertas Jr. 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 Initially progressing well with one BM on 1/27. KUB obtained 1/25 and medium colonic stool burden seen.     -Obtain CT abdomen/pelvis with contrast today  -Continue low fiber/low fat diet per urology  -Pain control PRN.  -Encourage ambulation  -Primary cares per urology    Patient seen and discussed with colorectal fellow, Dr. Perez, and discussed with staff.    Josephine Husain  [employer] [employer] [employer], MS4    .I, Farida Mena MD, saw the patient with the medical student and have edited the note to reflect our combined findings. I agree with the history, exam, assessment and plan as outlined above and have discussed this patient with the colorectal " fellow who will discuss with staff.     Farida Mena  General Surgery Resident, PGY-1

## 2021-01-27 NOTE — PLAN OF CARE
Activity: Up ad jacy, pt rested in bed during the shift. OOB activity encouraged.  Neuros: A&O x4, neuros intact. Flat affect.  Cardiac: WDL, denies cardiac chest pain.  Respiratory: WDL, stable on RA, denies SOB.  GI/: Voiding, not saving. Pt reports medium formed BM tonight but per colorectal fellow, he wanted suppository administered to the pt. Mag citrate also ordered but pt did not tolerate.  Diet: Low fiber diet, no appetite. Only consuming clear liquids.  Skin/Incisions: Warm, dry, intact. Midline incision stapled, CDI. No new deficits noted.  Lines/Drains: L PIV SL  Labs: Reviewed.  Pain/nausea:  Pain relieved with PO dilaudid. Scheduled IV dilaudid administered at 2000. Pt did not complain of nausea but stated the mag citrate made him throw up.  New changes this shift:  No acute changes.  Plan: Continue with POC, likely discharge tomorrow.

## 2021-01-28 ENCOUNTER — PATIENT OUTREACH (OUTPATIENT)
Dept: CARE COORDINATION | Facility: CLINIC | Age: 26
End: 2021-01-28

## 2021-01-28 VITALS
HEART RATE: 103 BPM | WEIGHT: 136.5 LBS | SYSTOLIC BLOOD PRESSURE: 115 MMHG | BODY MASS INDEX: 21.43 KG/M2 | TEMPERATURE: 96.5 F | HEIGHT: 67 IN | OXYGEN SATURATION: 99 % | DIASTOLIC BLOOD PRESSURE: 69 MMHG | RESPIRATION RATE: 16 BRPM

## 2021-01-28 LAB
ANION GAP SERPL CALCULATED.3IONS-SCNC: 6 MMOL/L (ref 3–14)
BUN SERPL-MCNC: 14 MG/DL (ref 7–30)
CALCIUM SERPL-MCNC: 9.5 MG/DL (ref 8.5–10.1)
CHLORIDE SERPL-SCNC: 100 MMOL/L (ref 94–109)
CO2 SERPL-SCNC: 27 MMOL/L (ref 20–32)
CREAT SERPL-MCNC: 1.34 MG/DL (ref 0.66–1.25)
ERYTHROCYTE [DISTWIDTH] IN BLOOD BY AUTOMATED COUNT: 15.6 % (ref 10–15)
GFR SERPL CREATININE-BSD FRML MDRD: 72 ML/MIN/{1.73_M2}
GLUCOSE SERPL-MCNC: 90 MG/DL (ref 70–99)
HCT VFR BLD AUTO: 26.8 % (ref 40–53)
HGB BLD-MCNC: 8.7 G/DL (ref 13.3–17.7)
MCH RBC QN AUTO: 29.7 PG (ref 26.5–33)
MCHC RBC AUTO-ENTMCNC: 32.5 G/DL (ref 31.5–36.5)
MCV RBC AUTO: 92 FL (ref 78–100)
PLATELET # BLD AUTO: 302 10E9/L (ref 150–450)
POTASSIUM SERPL-SCNC: 4.3 MMOL/L (ref 3.4–5.3)
RBC # BLD AUTO: 2.93 10E12/L (ref 4.4–5.9)
SODIUM SERPL-SCNC: 132 MMOL/L (ref 133–144)
WBC # BLD AUTO: 10.4 10E9/L (ref 4–11)

## 2021-01-28 PROCEDURE — 36415 COLL VENOUS BLD VENIPUNCTURE: CPT | Performed by: STUDENT IN AN ORGANIZED HEALTH CARE EDUCATION/TRAINING PROGRAM

## 2021-01-28 PROCEDURE — 250N000013 HC RX MED GY IP 250 OP 250 PS 637: Performed by: STUDENT IN AN ORGANIZED HEALTH CARE EDUCATION/TRAINING PROGRAM

## 2021-01-28 PROCEDURE — 80048 BASIC METABOLIC PNL TOTAL CA: CPT | Performed by: STUDENT IN AN ORGANIZED HEALTH CARE EDUCATION/TRAINING PROGRAM

## 2021-01-28 PROCEDURE — 250N000011 HC RX IP 250 OP 636: Performed by: PHYSICIAN ASSISTANT

## 2021-01-28 PROCEDURE — 85027 COMPLETE CBC AUTOMATED: CPT | Performed by: STUDENT IN AN ORGANIZED HEALTH CARE EDUCATION/TRAINING PROGRAM

## 2021-01-28 PROCEDURE — 250N000013 HC RX MED GY IP 250 OP 250 PS 637: Performed by: PHYSICIAN ASSISTANT

## 2021-01-28 RX ORDER — SODIUM PHOSPHATE, DIBASIC AND SODIUM PHOSPHATE, MONOBASIC 3.5; 9.5 G/66ML; G/66ML
1 ENEMA RECTAL DAILY
Qty: 3 ENEMA | Refills: 0 | Status: SHIPPED | OUTPATIENT
Start: 2021-01-28 | End: 2021-01-31

## 2021-01-28 RX ORDER — SODIUM PHOSPHATE, DIBASIC AND SODIUM PHOSPHATE, MONOBASIC 3.5; 9.5 G/66ML; G/66ML
1 ENEMA RECTAL DAILY PRN
Qty: 5 ENEMA | Refills: 0 | Status: SHIPPED | OUTPATIENT
Start: 2021-01-28 | End: 2021-01-28

## 2021-01-28 RX ADMIN — HEPARIN SODIUM 5000 UNITS: 5000 INJECTION, SOLUTION INTRAVENOUS; SUBCUTANEOUS at 02:25

## 2021-01-28 RX ADMIN — HYDROMORPHONE HYDROCHLORIDE 4 MG: 2 TABLET ORAL at 02:26

## 2021-01-28 RX ADMIN — POLYETHYLENE GLYCOL 3350 17 G: 17 POWDER, FOR SOLUTION ORAL at 09:11

## 2021-01-28 ASSESSMENT — ACTIVITIES OF DAILY LIVING (ADL)
ADLS_ACUITY_SCORE: 14
ADLS_ACUITY_SCORE: 16
ADLS_ACUITY_SCORE: 14
ADLS_ACUITY_SCORE: 14

## 2021-01-28 NOTE — PROVIDER NOTIFICATION
Pt discharged at 1300. All medications ready in discharge pharmacy except for enema. Discharge pharmacy calling thoracic team. No call back yet. Pt anxious to discharge home and not willing to wait for enema. All other medications picked up for pt. This writer received a call from CRS and I updated CRS TREV Tabor about pt not willing to wait for the enema orders. She will follow up with discharge pharmacy at [phone] and or call pt. Pt was also informed he could get the Sodium phosphate enemas over the counter - which is ordered for 3 days once daily.

## 2021-01-28 NOTE — PROGRESS NOTES
"Colorectal Surgery Progress Note     POD 10 s/p resection of proximal jejunum and L colectomy with resection of L retroperitoneal mass and L nephrectomy     Subjective:   Patient feels well. One bout of emesis when receiving evening medications. No nausea this morning. Pain well controlled. Tolerating soups. BM x 6 in last 24 hours after enema. Feels comfortable giving himself enemas at home. Feels he is ready to discharge.     Physical Exam:  /69 (BP Location: Right arm)   Pulse 103   Temp 96.5  F (35.8  C) (Axillary)   Resp 16   Ht 1.702 m (5' 7.01\")   Wt 61.9 kg (136 lb 8 oz)   SpO2 99%   BMI 21.37 kg/m      No acute distress  Alert and oriented   Breathing comfortably on room air  Abdomen is soft, non-distended, no tenderness to palpation. Mid abdominal incision clean and dry with staples intact. No drainage noted  No lower extremity edema, warm and well perfused    ASSESSMENT/PLAN:      Cyrus Huertas Jr. 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 Initially progressing well with one BM on 1/27. KUB obtained 1/25 and medium colonic stool burden seen. CT scan 1/27 continued to show large stool burden in rectum.    -Continue low fiber/low fat diet  -Pain control PRN.  -Encourage ambulation  -Will discharge with fleet enemas.   -Ok to discharge and follow up in colorectal clinic. Our team will set up the follow up visit.    Patient seen and discussed with colorectal fellow, Dr. Perez, and discussed with staff.    Farida Mena  General Surgery Resident, PGY-1  "

## 2021-01-28 NOTE — PROGRESS NOTES
CLINICAL NUTRITION SERVICES - BRIEF NOTE      Reason for RD note:   Education on low fiber and low fat diet to follow for 30 days.     New Findings/Chart Review:  s/p left radical nephrectomy, resection of retroperitoneal mass, lymphadenectomy, left colectomy, resection of proximal jejunectomy on 1/18/21      Interventions:  Provided pt with Fat Restricted Nutrition Therapy and Fat Restricted (13 gram) Nutrition Therapy.  Briefly discussed handouts.  Pt states that he is familiar with following diets and reading labels as he used to be a .       Future/Additional Recommendations:  Pt discharging today.     Nutrition will continue to follow per protocol.

## 2021-01-28 NOTE — PLAN OF CARE
Activity: up independent , patient ambulated in vazquez this shift  Neuros: alert and oriented x 4  Cardiac:denies chest pain  Respiratory: room air, IS encouraged  GI/: Fleets enema given with positive results, voiding  Diet: low fiber, low fat diet. Poor oral intake this shift  Skin: surgical incision abdomen, staples FAVIOLA  Lines/Drains: PIV saline locked RFA, right chest port (not accessed)    Patient had projectile clear emesis. Colorectal service aware and showed patient his CT scan results from today. PRN Zofran IV given. Will continue to monitor.

## 2021-01-28 NOTE — PROGRESS NOTES
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, not unexpected given RPLND   - CRS reviewed CT as well, no indication for intervention, though large stool ball worrisome given anastomosis, enema recommended   - enema given last night with patient report 4-5 bowel movements    O:  Vitals: /64 (BP Location: Right arm)   Pulse 97   Temp 99.5  F (37.5  C) (Oral)   Resp 16   Ht 1.702 m (5' 7.01\")   Wt 61.9 kg (136 lb 8 oz)   SpO2 98%   BMI 21.37 kg/m      General: Alert, interactive, in NAD  Resp: Non-labored breathing on RA  Abdomen: Soft, non-tender, non-distended, midline incision CDI with staples,   Ext: Warm and well perfused, no edema     I/O  UOP unmeasured 3x  Stool 2x/3x    Labs    Heme:  Recent Labs   Lab 01/27/21  0626 01/26/21  0554 01/25/21  2123 01/25/21  0549   WBC 9.7 7.0 9.1 7.7   HGB 8.7* 7.9* 8.3* 8.3*    230 239 215     Chem:  Recent Labs   Lab 01/27/21  0626 01/26/21  0554 01/25/21  0549 01/24/21  0702    137 137 137   POTASSIUM 4.4 4.1 4.3 3.7   CR 1.30* 1.25 1.25 1.22       Assessment/Plan  25 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 Lidocaine patches today, encourage oral pain meds.   CV Normotensive, tachycardia improving   PULM Aggressive pulmonary toilet and I/S.   FEN/GI IVF: discontinue  Diet: low fiber low fat   Bowel regimen: None  Nutrition consult  Will need to touch base with CRS given large stool ball and concern for anastomosis whether okay for " discharge.    Voiding spontaneously   HEME Hgb as above.    ID Afebrile, no leukocytosis.    Antibiotics: Completed periop antibiotics    ENDO No issues   ACTIVITY Up as tolerated    PPx SCDs, ambulation, Heparin 5000 TID   HOME RX ON HOLD Amlodipine, dexamethasone, oxycodone, miralax, compazine, zofran   DISPO Home today pending colorectal okay, staples to be removed next week in clinic  PT/OT recommendations: No acute skilled PT needs.     Seen and examined with chief resident, to be discussed with Dr. Major    --  Bryn Johnson MD MS  Urology Resident, PGY-2

## 2021-01-28 NOTE — PROGRESS NOTES
01/28/21 0738   Vital Signs   Temp 96.5  F (35.8  C)   Temp src Axillary   Resp 16   Pulse 103   Pulse Rate Source Monitor   /69   BP Location Right arm        Activity: Patient ambulated independently in vazquez and took shower before discharge.  Neuro: A&O x4   Cardiac: WNL except for tachy present, no pain noted   Resp: room air   GI/: bowel sounds present in all quadrants, reported last BM on 1/28/21 NOC  Lines/drains: PIV saline lock removed   Skin: surgical incision, staples, no drainage      [facility] is ready for discharge today w/ orders in place. Patient stated he had 3 BM's during the night.This staff went over discharge paperwork, answered questions and educated patient current medication, pain management, incision care and infection prevention.This staff gave patient copy of discharge paperwork and current prescribed medication from discharge pharm. Patient verbalized he did understand teaching and instructions on discharge paperwork. [facility], patient will take UBER home and follow up with physician at next appointment scheduled.

## 2021-01-28 NOTE — PLAN OF CARE
"/64 (BP Location: Right arm)   Pulse 97   Temp 99.5  F (37.5  C) (Oral)   Resp 16   Ht 1.702 m (5' 7.01\")   Wt 61.9 kg (136 lb 8 oz)   SpO2 98%   BMI 21.37 kg/m      Reason for admission: 1/18- resection of proximal jejunum and L colectomy with resection of L retroperitoneal mass and L nephrectomy   Neuro: A&Ox4, calls appropriately, temp 99.5  Cardiac: ex tachy, no chest pain  Respiratory: WDL, RA, no SOB  GI/: voiding adequately, BM x3 pt reported formed  Pain: given dilaudid x1, no nausea/vomiting  Lines: R PIV-SL  Drains: X  Incisions: midline- sutured/ FAVIOLA  Activity: up ad jacy  Diet: Low fiber, low fat  Labs: Creat- 1.3, hgb- 8.7, CT completed 1/27  Changes/Plan: continue POC  "

## 2021-01-29 ENCOUNTER — PATIENT OUTREACH (OUTPATIENT)
Dept: SURGERY | Facility: CLINIC | Age: 26
End: 2021-01-29

## 2021-01-29 NOTE — PROGRESS NOTES
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          ASSESSMENT      Discharge Assessment  Patient reports symptoms are: Improved  Does the patient have all of their medications?: Yes  Does patient know what their new medications are for?: Yes  Does patient have a follow-up appointment scheduled?: Yes  Does patient have any other questions or concerns?: No    Post-op  Did the patient have surgery or a procedure: Yes  Drainage: No  Bleeding: none  Fever: No  Chills: No  Eating & Drinking: eating and drinking without complaints/concerns  Bowel Function: normal  Urinary Status: voiding without complaint/concerns        PLAN                                                      Outpatient Plan:  - Schedule an appointment to be seen by your primary care provider within 7-10 days of discharge for a postoperative checkup.   - Follow up with Dr. Major as scheduled on 2/3/21 for a postop check, to remove staples and to discuss pathology  - Colorectal surgery will also be arranging followup for you over the next several weeks.   - Call or return sooner than your regularly scheduled visit if you develop any of the following:  Fever (greater than 101.3F), uncontrolled pain, uncontrolled nausea or vomiting, concerns about bowel function, as well as increased redness, swelling, drainage from your wound or any concerns about urinating or urinary catheter drainage.      Future Appointments   Date Time  Provider Department Center   2/3/2021  9:00 AM Frank Major MD UROP [facility]           Graciela Vieyra

## 2021-02-03 ENCOUNTER — OFFICE VISIT (OUTPATIENT)
Dept: UROLOGY | Facility: CLINIC | Age: 26
End: 2021-02-03
Payer: COMMERCIAL

## 2021-02-03 VITALS — SYSTOLIC BLOOD PRESSURE: 116 MMHG | HEART RATE: 128 BPM | DIASTOLIC BLOOD PRESSURE: 74 MMHG

## 2021-02-03 DIAGNOSIS — C62.90 MALIGNANT NEOPLASM OF TESTIS METASTATIC TO INTRA-ABDOMINAL LYMPH NODE (H): Primary | ICD-10-CM

## 2021-02-03 DIAGNOSIS — C77.2 MALIGNANT NEOPLASM OF TESTIS METASTATIC TO INTRA-ABDOMINAL LYMPH NODE (H): Primary | ICD-10-CM

## 2021-02-03 PROCEDURE — 99024 POSTOP FOLLOW-UP VISIT: CPT | Performed by: UROLOGY

## 2021-02-03 ASSESSMENT — PAIN SCALES - GENERAL: PAINLEVEL: MILD PAIN (3)

## 2021-02-03 NOTE — LETTER
2/3/2021      RE: Cyrus Huertas Jr.  [address] [place] [address]       CHIEF COMPLAINT  It was my pleasure to see Cyrus Huertas Jr. who is a 25 year old male for follow-up for surgery for metastatic testicular cancer. He is now s/p left radical nephrectomy with resection of retroperitoneal mass, RPLND, proximal jejunectomy, descending and transverse colectomy with primary anastamosis.    HPI  Cyrus Huertas is a very pleasant 25 year old male who presents with a history of testis cancer. He initially presented in Sept 2020 with large left testis mass. Of note, also had COVID 19 at that time. He subsequently was found to have large left testis mass with large retroperitoneal nodes involving the left kidney, consistent with metastasis. Tumor markers were elevated, most notably AFP which was 123,000 at diagnosis.     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 He subsequently underwent [place] at [facility] with total of 5 cycles of Etoposide, Cisplatin, and Ifosfamide. Overall he tolerated chemotherapy very well. His tumor markers declined but remained elevated. On post-treatment PET scan, he was noted to have had interval improvement in size of retroperitoneal mass, but this remains rather large and clearly invades the left kidney.     He is now s/p RPLND, left nephrectomy, with colectomy and jejunectomy due to direct invasion of tumor. Overall continues clinical improvement. Taking small meals. Still with some nausea, but overall doing well. Passing stool. Does have some mild serous drainage from incision, but gradually improving.     AFP  1/14/2021 - 435  12/3/2020 - 1,031  11/18/2020 - 2,471  10/28/2020 - 13,003  10/22/2020 - 21,635  10/7/2020 - 79,636  9/17/2020 - 123,828     Left Orchiectomy 9/7/2020  Final Diagnosis:  A.  Testis, left testicular tumor, orchiectomy - Mixed germ cell tumor (yolk sac-83%,   teratoma-15%,  embryonal-2%). Tumor invades the scrotum. Margins free of tumor. Pathologic   staging (AJCC 8th ed.): pT4. See Comment for complete tumor synopsis.    B.  Testis, left testicle medial margin with scrotal involvement, orchiectomy - Yolk sac tumor   involving the subcutaneous scrotal soft1/ tissue. Margins free of tumor.    1/18/21  A. Peritoneal implant:   - Fibrous nodule with therapy related changes   - Negative for malignancy     B. Left kidney, eduard-aortic mass, descending and transverse colon:   - Residual yolk sac tumor (0.6 cm and 0.3 cm foci) and small scattered   foci of teratoma elements   - Extensive therapy related changes (>90% of tumor mass)   - 2/25 lymph nodes with residual teratoma elements   - Benign colon and kidney tissue   - See gross description for additional details     C. Lymph node, intra aortic caval lymph node:   - Six benign lymph nodes     D. Left spermatic cord:   - Benign spermatic cord tissue     E. Jejunum:   - Benign segment of intestine     F. Lymph node, para-caval:   - Two benign lymph nodes     G. Lymph node, intra-aortic caval #2:   - Four benign lymph nodes     PHYSICAL EXAM  Patient is a 25 year old  male   Vitals: There were no vitals taken for this visit.  General Appearance Adult: There is no height or weight on file to calculate BMI.  Alert, no acute distress, oriented  Lungs: no respiratory distress, or pursed lip breathing  Abdomen: soft, nontender, no organomegaly or masses  Incision healing well. Staples removed today. No significant drainage visualized on exam today.   No induration or erythema  Back: no CVAT  Neuro: Alert, oriented, speech and mentation normal  Psych: affect and mood normal    ASSESSMENT and PLAN  25 year old male with history of metastatic NSGCT, now s/p RPLND of residual post-chemo mass involving kidney, colon, and jejunum. Overall has recovered without complication. We discussed his pathology today demonstrating some viable yolk sac elements and  teratoma, with 2 additional nodes demonstrating teratoma. We discussed the importance of ongoing surveillance. He plans to meet with his medical oncology team next week. I would like to see him back in about 1 month with CT and labs.    - Follow-up with Dr. Corey, as scheduled  - CT chest/abd/pelvis and BMP, CBC, AFP, HCG, LDH in 1 month    Greater than 20 minutes spent on the date of the encounter doing chart review, history and exam, documentation and further activities as noted above.    Frank Major MD  Urology  [facility]

## 2021-02-03 NOTE — PROGRESS NOTES
CHIEF COMPLAINT  It was my pleasure to see Cyrus Huertas Jr. who is a 25 year old male for follow-up for surgery for metastatic testicular cancer. He is now s/p left radical nephrectomy with resection of retroperitoneal mass, RPLND, proximal jejunectomy, descending and transverse colectomy with primary anastamosis.    HPI  Cyrus Huertas is a very pleasant 25 year old male who presents with a history of testis cancer. He initially presented in Sept 2020 with large left testis mass. Of note, also had COVID 19 at that time. He subsequently was found to have large left testis mass with large retroperitoneal nodes involving the left kidney, consistent with metastasis. Tumor markers were elevated, most notably AFP which was 123,000 at diagnosis.     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 [place] [facility] [place] of 5 cycles of Etoposide, Cisplatin, and Ifosfamide. Overall he tolerated chemotherapy very well. His tumor markers declined but remained elevated. On post-treatment PET scan, he was noted to have had interval improvement in size of retroperitoneal mass, but this remains rather large and clearly invades the left kidney.     He is now s/p RPLND, left nephrectomy, with colectomy and jejunectomy due to direct invasion of tumor. Overall continues clinical improvement. Taking small meals. Still with some nausea, but overall doing well. Passing stool. Does have some mild serous drainage from incision, but gradually improving.     AFP  1/14/2021 - 435  12/3/2020 - 1,031  11/18/2020 - 2,471  10/28/2020 - 13,003  10/22/2020 - 21,635  10/7/2020 - 79,636  9/17/2020 - 123,828     Left Orchiectomy 9/7/2020  Final Diagnosis:  A.  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 Tumor invades the scrotum. Margins free of tumor. Pathologic   staging (AJCC 8th  ed.): pT4. See Comment for complete tumor synopsis.    B.  Testis, left testicle medial margin with scrotal involvement, orchiectomy - Yolk sac tumor   involving the subcutaneous scrotal soft1/ tissue. Margins free of tumor.    1/18/21  A. Peritoneal implant:   - Fibrous nodule with therapy related changes   - Negative for malignancy     B. Left kidney, deuard-aortic mass, descending and transverse colon:   - Residual yolk sac tumor (0.6 cm and 0.3 cm foci) and small scattered   foci of teratoma elements   - Extensive therapy related changes (>90% of tumor mass)   - 2/25 lymph nodes with residual teratoma elements   - Benign colon and kidney tissue   - See gross description for additional details     C. Lymph node, intra aortic caval lymph node:   - Six benign lymph nodes     D. Left spermatic cord:   - Benign spermatic cord tissue     E. Jejunum:   - Benign segment of intestine     F. Lymph node, para-caval:   - Two benign lymph nodes     G. Lymph node, intra-aortic caval #2:   - Four benign lymph nodes     PHYSICAL EXAM  Patient is a 25 year old  male   Vitals: There were no vitals taken for this visit.  General Appearance Adult: There is no height or weight on file to calculate BMI.  Alert, no acute distress, oriented  Lungs: no respiratory distress, or pursed lip breathing  Abdomen: soft, nontender, no organomegaly or masses  Incision healing well. Staples removed today. No significant drainage visualized on exam today.   No induration or erythema  Back: no CVAT  Neuro: Alert, oriented, speech and mentation normal  Psych: affect and mood normal    ASSESSMENT and PLAN  25 year old male with history of metastatic NSGCT, now s/p RPLND of residual post-chemo mass involving kidney, colon, and jejunum. Overall has recovered without complication. We discussed his pathology today demonstrating some viable yolk sac elements and teratoma, with 2 additional nodes demonstrating teratoma. We discussed the importance of ongoing  surveillance. He plans to meet with his medical oncology team next week. I would like to see him back in about 1 month with CT and labs.    - Follow-up with Dr. Corey, as scheduled  - CT chest/abd/pelvis and BMP, CBC, AFP, HCG, LDH in 1 month    Greater than 20 minutes spent on the date of the encounter doing chart review, history and exam, documentation and further activities as noted above.    Frank Major MD  Urology  [facility]

## 2021-02-03 NOTE — PATIENT INSTRUCTIONS
Please schedule appointment with Dr. Major in one month with a CT and labs completed prior to appointment.    It was a pleasure meeting with you today.  Thank you for allowing me and my team the privilege of caring for you today.  YOU are the reason we are here, and I truly hope we provided you with the excellent service you deserve.  Please let us know if there is anything else we can do for you so that we can be sure you are leaving completely satisfied with your care experience.

## 2021-02-12 ENCOUNTER — TRANSFERRED RECORDS (OUTPATIENT)
Dept: HEALTH INFORMATION MANAGEMENT | Facility: CLINIC | Age: 26
End: 2021-02-12

## 2021-02-22 NOTE — TELEPHONE ENCOUNTER
RECORDS RECEIVED FROM: post op   DATE RECEIVED: 3.17.21   NOTES STATUS DETAILS   OFFICE NOTE from referring provider  N/A    OFFICE NOTE from other specialist   [place] [facility]  12.17.20 Dr Major, [facility]    DISCHARGE SUMMARY from hospital  Internal 1.18.21-1.28.21, [facility]  12.11.20 [facility]  10.9.20[place]10.[place] [facility]  [place]17.20[place]9.[place] [facility]  More in Care Everywhere   DISCHARGE REPORT from the ER N/A    OPERATIVE REPORT  Internal 1.18.21 Dr Major and Dr Angela, [facility]    MEDICATION LIST Internal    LABS     PFC TESTING N/A    ANAL PAP N/A    BIOPSIES/PATHOLOGY RELATED TO DIAGNOSIS Received    DIAGNOSTIC PROCEDURES     COLONOSCOPY N/A    UPPER ENDOSCOPY (EGD) N/A    FLEX SIGMOIDOSCOPY  Internal 1.18.21 Dr Ishmael Villgeas, [facility]   ERCP N/A    IMAGING (DISC & REPORT)      CT  [place] 10[place]26.20, [facility]   MRI N/A    XRAY Internal    ULTRASOUND (ENDOANAL/ENDORECTAL) N/A

## 2021-02-26 ENCOUNTER — OFFICE VISIT (OUTPATIENT)
Dept: SURGERY | Facility: CLINIC | Age: 26
End: 2021-02-26
Payer: COMMERCIAL

## 2021-02-26 VITALS
WEIGHT: 127.5 LBS | HEIGHT: 67 IN | BODY MASS INDEX: 20.01 KG/M2 | HEART RATE: 117 BPM | OXYGEN SATURATION: 100 % | DIASTOLIC BLOOD PRESSURE: 56 MMHG | SYSTOLIC BLOOD PRESSURE: 96 MMHG | TEMPERATURE: 97.6 F

## 2021-02-26 DIAGNOSIS — I95.1 ORTHOSTATIC HYPOTENSION: ICD-10-CM

## 2021-02-26 DIAGNOSIS — R00.0 TACHYCARDIA: ICD-10-CM

## 2021-02-26 DIAGNOSIS — C80.1 MIXED GERM CELL TUMOR (H): ICD-10-CM

## 2021-02-26 DIAGNOSIS — C77.2 MALIGNANT NEOPLASM OF TESTIS METASTATIC TO INTRA-ABDOMINAL LYMPH NODE (H): ICD-10-CM

## 2021-02-26 DIAGNOSIS — I95.89 OTHER SPECIFIED HYPOTENSION: ICD-10-CM

## 2021-02-26 DIAGNOSIS — Z09 FOLLOW-UP EXAMINATION AFTER COLORECTAL SURGERY: Primary | ICD-10-CM

## 2021-02-26 DIAGNOSIS — C62.90 MALIGNANT NEOPLASM OF TESTIS METASTATIC TO INTRA-ABDOMINAL LYMPH NODE (H): ICD-10-CM

## 2021-02-26 LAB
AFP SERPL-MCNC: 14 UG/L (ref 0–8)
ALBUMIN SERPL-MCNC: 3 G/DL (ref 3.4–5)
ALP SERPL-CCNC: 105 U/L (ref 40–150)
ALT SERPL W P-5'-P-CCNC: 24 U/L (ref 0–70)
ANION GAP SERPL CALCULATED.3IONS-SCNC: 5 MMOL/L (ref 3–14)
AST SERPL W P-5'-P-CCNC: 17 U/L (ref 0–45)
BILIRUB SERPL-MCNC: 0.1 MG/DL (ref 0.2–1.3)
BUN SERPL-MCNC: 15 MG/DL (ref 7–30)
CALCIUM SERPL-MCNC: 9.6 MG/DL (ref 8.5–10.1)
CHLORIDE SERPL-SCNC: 106 MMOL/L (ref 94–109)
CO2 SERPL-SCNC: 29 MMOL/L (ref 20–32)
CREAT SERPL-MCNC: 1.36 MG/DL (ref 0.66–1.25)
ERYTHROCYTE [DISTWIDTH] IN BLOOD BY AUTOMATED COUNT: 18.6 % (ref 10–15)
GFR SERPL CREATININE-BSD FRML MDRD: 70 ML/MIN/{1.73_M2}
GLUCOSE SERPL-MCNC: 76 MG/DL (ref 70–99)
HCT VFR BLD AUTO: 31.1 % (ref 40–53)
HGB BLD-MCNC: 9.3 G/DL (ref 13.3–17.7)
LDH SERPL L TO P-CCNC: 130 U/L (ref 85–227)
MCH RBC QN AUTO: 27.4 PG (ref 26.5–33)
MCHC RBC AUTO-ENTMCNC: 29.9 G/DL (ref 31.5–36.5)
MCV RBC AUTO: 92 FL (ref 78–100)
PLATELET # BLD AUTO: 370 10E9/L (ref 150–450)
POTASSIUM SERPL-SCNC: 4.2 MMOL/L (ref 3.4–5.3)
PROT SERPL-MCNC: 8.5 G/DL (ref 6.8–8.8)
RBC # BLD AUTO: 3.4 10E12/L (ref 4.4–5.9)
SODIUM SERPL-SCNC: 140 MMOL/L (ref 133–144)
WBC # BLD AUTO: 3.9 10E9/L (ref 4–11)

## 2021-02-26 PROCEDURE — 85027 COMPLETE CBC AUTOMATED: CPT | Performed by: PATHOLOGY

## 2021-02-26 PROCEDURE — 80053 COMPREHEN METABOLIC PANEL: CPT | Performed by: PATHOLOGY

## 2021-02-26 PROCEDURE — 99024 POSTOP FOLLOW-UP VISIT: CPT | Performed by: NURSE PRACTITIONER

## 2021-02-26 PROCEDURE — 83615 LACTATE (LD) (LDH) ENZYME: CPT | Performed by: PATHOLOGY

## 2021-02-26 PROCEDURE — 36415 COLL VENOUS BLD VENIPUNCTURE: CPT | Performed by: PATHOLOGY

## 2021-02-26 PROCEDURE — 84702 CHORIONIC GONADOTROPIN TEST: CPT | Mod: 90 | Performed by: PATHOLOGY

## 2021-02-26 PROCEDURE — 99000 SPECIMEN HANDLING OFFICE-LAB: CPT | Performed by: PATHOLOGY

## 2021-02-26 PROCEDURE — 82105 ALPHA-FETOPROTEIN SERUM: CPT | Mod: 90 | Performed by: PATHOLOGY

## 2021-02-26 ASSESSMENT — PAIN SCALES - GENERAL: PAINLEVEL: NO PAIN (0)

## 2021-02-26 ASSESSMENT — MIFFLIN-ST. JEOR: SCORE: 1511.97

## 2021-02-26 NOTE — PROGRESS NOTES
Colon and Rectal Surgery Postoperative Clinic Note    RE: Cyrus Huertas Jr.  : 1995  LANDY: 2021    Cyrus Huertas Jr. is a very pleasant 25 year old male who was diagnosed in 2020 with a left testicular mass. He underwent a left transinguinal orchiectomy which was notable for a mixed germ cell tumor, predominantly yolk sac type. AFP was 123,828. He was staged as IIIB ([id number]), and underwent 5 cycles of VIP. He had a persistent L retroperitoneal mass, which shrank from 22 to 13 cm. Dr. Angela was consulted intra-op with Dr. Major for assistance with exploratory laparotomy, resection of L retroperitoneal mass with en block L nephrectomy and L colectomy, manual disimpaction, complex, flexible sigmoidoscopy, washout of rectal stump, stapled side-to-side, functional end-to-end transverse to sigmoid colon anastomosis, resection of segment of proximal jejunum, with hand-sewn side-to-side, functional end-to-end anastomosis on 21.     FINAL DIAGNOSIS:   A. Peritoneal implant:   - Fibrous nodule with therapy related changes   - Negative for malignancy     B. Left kidney, eduard-aortic mass, descending and transverse colon:   - Residual yolk sac tumor (0.6 cm and 0.3 cm foci) and small scattered   foci of teratoma elements   - Extensive therapy related changes (>90% of tumor mass)   -  lymph nodes with residual teratoma elements   - Benign colon and kidney tissue   - See gross description for additional details     C. Lymph node, intra aortic caval lymph node:   - Six benign lymph nodes     D. Left spermatic cord:   - Benign spermatic cord tissue     E. Jejunum:   - Benign segment of intestine     F. Lymph node, para-caval:   - Two benign lymph nodes     G. Lymph node, intra-aortic caval #2:   - Four benign lymph nodes     Interval history: He met with Dr. Major postoperatively with plan for  CT CAP and labs in one month. [place] [facility] [place] has lost  "quite a bit of weight but his appetite is starting to come back and he is eating more. He is drinking fluids and reports that his urine is light in color but he is a little light headed. He denies any fevers or chills. He was having some vomiting up until about 2 weeks ago. He has been constipated the past few days and states that he has enemas and Miralax for that but has not used it recently.     Physical Examination:   BP 96/56 (BP Location: Left arm, Patient Position: Sitting, Cuff Size: Adult Regular)   Pulse 117   Temp 97.6  F (36.4  C) (Oral)   Ht 5' 7\"   Wt 127 lb 8 oz   SpO2 100%   BMI 19.97 kg/m    General: alert, oriented, in no acute distress, sitting comfortably  HEENT: mucous membranes moist  Abdomen: soft, non distended, non tender on palpation. Midline wound with opening in the inferior portion of the wound that measures approximately 1 X 3 cm. This was packed with 1/4 inch NuGauze. Some hypergranulation tissue present and silver nitrate was applied to this.    Assessment/Plan:  25 year old male status post exploratory laparotomy, resection of L retroperitoneal mass with en block L nephrectomy and L colectomy, manual disimpaction, complex, flexible sigmoidoscopy, washout of rectal stump, stapled side-to-side, functional end-to-end transverse to sigmoid colon anastomosis, resection of segment of proximal jejunum, with hand-sewn side-to-side, functional end-to-end anastomosis on 1/18/21 with Dr. Major and Dr. Angela. He is recovering fairly well. His weight is down quite a bit and he is tachycardic and hypotensive today. Will check CMP but he reports drinking and voiding normally. Open area in his midline wound with hypergranulation tissue. Will have him pack with NuGauze and follow up in about 3 weeks. He is scheduled to see Dr. Angela in 3 weeks. Continue to follow with Dr. Major and Dr. Corey as well. Recommended taking Miralax daily to every other day to avoid constipation and " can use enemas in addition as needed. Patient's questions were answered to his stated satisfaction and he is in agreement with this plan.      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) 12/17/2020     Priority: Medium     Mixed germ cell tumor (H) 12/17/2020     Priority: Medium     Secondary hypertension 12/17/2020     Priority: Medium     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 20 patch 0     methocarbamol (ROBAXIN) 500 MG tablet Take 1 tablet (500 mg) by mouth 4 times daily as needed for muscle spasms (abdominal pain) 20 tablet 0     NO ACTIVE MEDICATIONS .       ondansetron (ZOFRAN) 4 MG tablet Take 1 tablet (4 mg) by mouth every 6 hours as needed for nausea 16 tablet 0     polyethylene glycol (MIRALAX) 17 GM/Dose powder Take 17 g (1 capful) by mouth daily (hold for loose stools) 578 g 1     prochlorperazine (COMPAZINE) 10 MG tablet        HYDROmorphone (DILAUDID) 2 MG tablet Take 1 tablet (2 mg) by mouth every 4 hours as needed for moderate to severe pain (Patient not taking: Reported on 2/3/2021) 16 tablet 0       Allergies:  Allergies   Allergen Reactions     Penicillins Anaphylaxis and Rash     rash     Lactose Unknown     Uncaria Tomentosa (Cats Claw) Rash       Family history:  Family History   Problem Relation Age of Onset     Anesthesia Reaction No family hx of      Cardiovascular No family hx of      Deep Vein Thrombosis (DVT) No family hx of        Social history:  Social History     Tobacco Use     Smoking status: Never Smoker     Smokeless tobacco: Never Used   Substance Use Topics     Alcohol use: Not Currently     Marital status: single.    Nursing Notes:   Nupur Holt CMA  2/26/2021 11:38 AM  Signed  Chief Complaint   Patient presents with     Surgical Followup     Post-op follow up, DOS: 01/17/2021       Vitals:    02/26/21 1127   BP: 96/56   BP Location: Left arm   Patient Position: Sitting   Cuff Size: Adult Regular   Pulse: 117   Temp: 97.6  F (36.4  C)   TempSrc: Oral   SpO2: 100%   Weight: 127 lb 8 oz   Height: 5' 7\"       Body mass index is 19.97 kg/m .       Nupur Hager CMA         20 minutes spent on the date of the encounter doing chart review, history and exam, documentation and further activities as noted above.   This is a postop visit.    DEXTER Terrazas, NP-C  Colon and Rectal Surgery  [facility]    This note was created using speech recognition software and may contain unintended word substitutions.

## 2021-02-26 NOTE — LETTER
2021       RE: Cyrus Huertas Jr.  [address] [place] [facility] [address]     Dear Colleague,    Thank you for referring your patient, Cyrus Huertas Jr., to the [facility] COL[facility] RECTAL SURGERY [facility]IN[facility] [place] at [facility]. Please see a copy of my visit note below.    Colon and Rectal Surgery Postoperative Clinic Note    RE: Cyrus Huertas Jr.  : 1995  LANDY: 2021    Cyrus Huertas Jr. is a very pleasant 25 year old male who was diagnosed in 2020 with a left testicular mass. He underwent a left transinguinal orchiectomy which was notable for a mixed germ cell tumor, predominantly yolk sac type. AFP was 123,828. He was staged as IIIB ([id number]), and underwent 5 cycles of VIP. He had a persistent L retroperitoneal mass, which shrank from 22 to 13 cm. Dr. Angela was consulted intra-op with Dr. Major for assistance with exploratory laparotomy, resection of L retroperitoneal mass with en block L nephrectomy and L colectomy, manual disimpaction, complex, flexible sigmoidoscopy, washout of rectal stump, stapled side-to-side, functional end-to-end transverse to sigmoid colon anastomosis, resection of segment of proximal jejunum, with hand-sewn side-to-side, functional end-to-end anastomosis on 21.     FINAL DIAGNOSIS:   A. Peritoneal implant:   - Fibrous nodule with therapy related changes   - Negative for malignancy     B. Left kidney, eduard-aortic mass, descending and transverse colon:   - Residual yolk sac tumor (0.6 cm and 0.3 cm foci) and small scattered   foci of teratoma elements   - Extensive therapy related changes (>90% of tumor mass)   -  lymph nodes with residual teratoma elements   - Benign colon and kidney tissue   - See gross description for additional details     C. Lymph node, intra aortic caval lymph node:   - Six benign lymph nodes     D. Left spermatic cord:   - Benign spermatic cord  "tissue     E. Jejunum:   - Benign segment of intestine     F. Lymph node, para-caval:   - Two benign lymph nodes     G. Lymph node, intra-aortic caval #2:   - Four benign lymph nodes     Interval history: He met with Dr. Major postoperatively with plan for  CT CAP and labs in one month. He follows with Dr. Corey of oncology at [facility] [place]. He has lost quite a bit of weight but his appetite is starting to come back and he is eating more. He is drinking fluids and reports that his urine is light in color but he is a little light headed. He denies any fevers or chills. He was having some vomiting up until about 2 weeks ago. He has been constipated the past few days and states that he has enemas and Miralax for that but has not used it recently.     Physical Examination:   BP 96/56 (BP Location: Left arm, Patient Position: Sitting, Cuff Size: Adult Regular)   Pulse 117   Temp 97.6  F (36.4  C) (Oral)   Ht 5' 7\"   Wt 127 lb 8 oz   SpO2 100%   BMI 19.97 kg/m    General: alert, oriented, in no acute distress, sitting comfortably  HEENT: mucous membranes moist  Abdomen: soft, non distended, non tender on palpation. Midline wound with opening in the inferior portion of the wound that measures approximately 1 X 3 cm. This was packed with 1/4 inch NuGauze. Some hypergranulation tissue present and silver nitrate was applied to this.    Assessment/Plan:  25 year old male status post exploratory laparotomy, resection of L retroperitoneal mass with en block L nephrectomy and L colectomy, manual disimpaction, complex, flexible sigmoidoscopy, washout of rectal stump, stapled side-to-side, functional end-to-end transverse to sigmoid colon anastomosis, resection of segment of proximal jejunum, with hand-sewn side-to-side, functional end-to-end anastomosis on 1/18/21 with Dr. Major and Dr. Angela. He is recovering fairly well. His weight is down quite a bit and he is tachycardic and hypotensive today. Will check CMP but he " reports drinking and voiding normally. Open area in his midline wound with hypergranulation tissue. Will have him pack with NuGauze and follow up in about 3 weeks. He is scheduled to see Dr. Angela in 3 weeks. Continue to follow with Dr. Major and Dr. Corey as well. Recommended taking Miralax daily to every other day to avoid constipation and can use enemas in addition as needed. Patient's questions were answered to his stated satisfaction and he is in agreement with this plan.      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) 12/17/2020     Priority: Medium     Mixed germ cell tumor (H) 12/17/2020     Priority: Medium     Secondary hypertension 12/17/2020     Priority: Medium     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 20 patch 0     methocarbamol (ROBAXIN) 500 MG tablet Take 1 tablet (500 mg) by mouth 4 times daily as needed for muscle spasms (abdominal pain) 20 tablet 0     NO ACTIVE MEDICATIONS .       ondansetron (ZOFRAN) 4 MG tablet Take 1 tablet (4 mg) by mouth every 6 hours as needed for nausea 16 tablet 0     polyethylene glycol (MIRALAX) 17 GM/Dose powder Take 17 g (1 capful) by mouth daily (hold for loose stools) 578 g 1     prochlorperazine (COMPAZINE) 10 MG tablet        HYDROmorphone (DILAUDID) 2 MG tablet Take 1 tablet (2 mg) by mouth every 4 hours as needed for moderate to severe pain (Patient not taking: Reported on 2/3/2021) 16 tablet 0       Allergies:  Allergies   Allergen Reactions     Penicillins Anaphylaxis and Rash     rash     Lactose Unknown     Uncaria Tomentosa (Cats Claw) Rash       Family history:  Family History   Problem Relation Age of Onset     Anesthesia Reaction No family hx of      Cardiovascular No family hx of      Deep Vein Thrombosis (DVT) No family hx of        Social history:  Social History     Tobacco Use     Smoking status: Never Smoker     Smokeless tobacco: Never Used   Substance Use Topics     Alcohol use: Not Currently     Marital status: single.    Nursing Notes:   Nupur Holt CMA  2/26/2021 11:38 AM  Signed  Chief Complaint   Patient presents with     Surgical Followup     Post-op follow up, DOS: 01/17/2021       Vitals:    02/26/21 1127   BP: 96/56   BP Location: Left arm   Patient Position: Sitting   Cuff Size: Adult Regular   Pulse: 117   Temp: 97.6  F (36.4  C)   TempSrc: Oral   SpO2: 100%   Weight: 127  "lb 8 oz   Height: 5' 7\"       Body mass index is 19.97 kg/m .       Nupur Hager CMA       20 minutes spent on the date of the encounter doing chart review, history and exam, documentation and further activities as noted above.   This is a postop visit.    DEXTER Terrazas, NP-C  Colon and Rectal Surgery  [facility]    This note was created using speech recognition software and may contain unintended word substitutions.        "

## 2021-02-26 NOTE — NURSING NOTE
"Chief Complaint   Patient presents with     Surgical Followup     Post-op follow up, DOS: 01/17/2021       Vitals:    02/26/21 1127   BP: 96/56   BP Location: Left arm   Patient Position: Sitting   Cuff Size: Adult Regular   Pulse: 117   Temp: 97.6  F (36.4  C)   TempSrc: Oral   SpO2: 100%   Weight: 127 lb 8 oz   Height: 5' 7\"       Body mass index is 19.97 kg/m .       Nupur Hager CMA    "

## 2021-03-01 ENCOUNTER — PRE VISIT (OUTPATIENT)
Dept: UROLOGY | Facility: CLINIC | Age: 26
End: 2021-03-01

## 2021-03-01 LAB — HCG-TM SERPL-ACNC: <3 IU/L

## 2021-03-01 NOTE — TELEPHONE ENCOUNTER
Chief Complaint : Follow up - 1 Month    Hx/Sx: Metastatic NSGCT (s/p RPLND)    Records/Orders: CT CAP scheduled for 3/3/21, BMP, CBC, AFP, HCG, LDH completed    Pt Contacted: N/a    At Rooming: Garima Fox, EMT

## 2021-03-03 ENCOUNTER — ANCILLARY PROCEDURE (OUTPATIENT)
Dept: CT IMAGING | Facility: CLINIC | Age: 26
End: 2021-03-03
Attending: UROLOGY
Payer: COMMERCIAL

## 2021-03-03 ENCOUNTER — OFFICE VISIT (OUTPATIENT)
Dept: UROLOGY | Facility: CLINIC | Age: 26
End: 2021-03-03
Payer: COMMERCIAL

## 2021-03-03 VITALS
HEART RATE: 116 BPM | SYSTOLIC BLOOD PRESSURE: 109 MMHG | DIASTOLIC BLOOD PRESSURE: 74 MMHG | BODY MASS INDEX: 19.62 KG/M2 | WEIGHT: 125 LBS | HEIGHT: 67 IN

## 2021-03-03 DIAGNOSIS — C79.02 MALIGNANT NEOPLASM METASTATIC TO LEFT KIDNEY (H): ICD-10-CM

## 2021-03-03 DIAGNOSIS — C62.90 MALIGNANT NEOPLASM OF TESTIS METASTATIC TO INTRA-ABDOMINAL LYMPH NODE (H): ICD-10-CM

## 2021-03-03 DIAGNOSIS — C77.2 MALIGNANT NEOPLASM OF TESTIS METASTATIC TO INTRA-ABDOMINAL LYMPH NODE (H): Primary | ICD-10-CM

## 2021-03-03 DIAGNOSIS — C77.2 MALIGNANT NEOPLASM OF TESTIS METASTATIC TO INTRA-ABDOMINAL LYMPH NODE (H): ICD-10-CM

## 2021-03-03 DIAGNOSIS — C62.90 MALIGNANT NEOPLASM OF TESTIS METASTATIC TO INTRA-ABDOMINAL LYMPH NODE (H): Primary | ICD-10-CM

## 2021-03-03 PROCEDURE — 74177 CT ABD & PELVIS W/CONTRAST: CPT | Performed by: RADIOLOGY

## 2021-03-03 PROCEDURE — 99024 POSTOP FOLLOW-UP VISIT: CPT | Performed by: UROLOGY

## 2021-03-03 PROCEDURE — 71260 CT THORAX DX C+: CPT | Performed by: RADIOLOGY

## 2021-03-03 RX ORDER — IOPAMIDOL 755 MG/ML
78 INJECTION, SOLUTION INTRAVASCULAR ONCE
Status: COMPLETED | OUTPATIENT
Start: 2021-03-03 | End: 2021-03-03

## 2021-03-03 RX ADMIN — IOPAMIDOL 78 ML: 755 INJECTION, SOLUTION INTRAVASCULAR at 09:40

## 2021-03-03 ASSESSMENT — PAIN SCALES - GENERAL: PAINLEVEL: NO PAIN (0)

## 2021-03-03 ASSESSMENT — MIFFLIN-ST. JEOR: SCORE: 1500.63

## 2021-03-03 NOTE — LETTER
3/3/2021       RE: Cyrus Huertas Jr.  [address] [facility] [address]     Dear Colleague,    Thank you for referring your patient, Cyrus Huertas Jr., to the [facility] UROLOGY CLINIC [place] at [facility]. Please see a copy of my visit note below.      CHIEF COMPLAINT  It was my pleasure to see Cyrus Huerats Jr. who is a 25 year old male for follow-up of testis cancer.      HPI  Cyrus Huertas is a very pleasant 25 year old male with a history of testis cancer. He initially presented in Sept 2020 with large left testis mass. Of note, also had COVID 19 at that time. He subsequently was found to have large left testis mass with large retroperitoneal nodes involving the left kidney, consistent with metastasis. Tumor markers were elevated, most notably AFP which was 123,000 at diagnosis.     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 He subsequently [place] [facility] [place] of 5 cycles of Etoposide, Cisplatin, and Ifosfamide. Overall he tolerated chemotherapy very well. His tumor markers declined but remained elevated. On post-treatment PET scan, he was noted to have had interval improvement in size of retroperitoneal mass, but this remained rather large and clearly invaded the left kidney.      He is now s/p RPLND, left nephrectomy, with colectomy and jejunectomy due to direct invasion of tumor 1/18/21. Overall continues clinical improvement. Appetite improving but has noted some weight loss. Has had some leakage from his midline incision which he is still packing daily. Denies pain. He just started taking miralax two days ago, last BM 4 days ago     AFP  2/26/2021 - 14  1/14/2021 - 435  12/3/2020 - 1,031  11/18/2020 - 2,471  10/28/2020 - 13,003  10/22/2020 - 21,635  10/7/2020 - 79,636  9/17/2020 - 123,828    Tumor Markers 2/26/2021  AFP - 14.0  HCG - <3  LDH  "- 130     Left Orchiectomy 9/7/2020  Final Diagnosis:  A.  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 Tumor invades the scrotum. Margins free of tumor. Pathologic   staging (AJCC 8th ed.): pT4. See Comment for complete tumor synopsis.    B.  Testis, left testicle medial margin with scrotal involvement, orchiectomy - Yolk sac tumor   involving the subcutaneous scrotal soft1/ tissue. Margins free of tumor.     1/18/21  A. Peritoneal implant:   - Fibrous nodule with therapy related changes   - Negative for malignancy     B. Left kidney, eduard-aortic mass, descending and transverse colon:   - Residual yolk sac tumor (0.6 cm and 0.3 cm foci) and small scattered   foci of teratoma elements   - Extensive therapy related changes (>90% of tumor mass)   - 2/25 lymph nodes with residual teratoma elements   - Benign colon and kidney tissue   - See gross description for additional details     C. Lymph node, intra aortic caval lymph node:   - Six benign lymph nodes     D. Left spermatic cord:   - Benign spermatic cord tissue     E. Jejunum:   - Benign segment of intestine     F. Lymph node, para-caval:   - Two benign lymph nodes     G. Lymph node, intra-aortic caval #2:   - Four benign lymph nodes     PHYSICAL EXAM  Patient is a 25 year old  male   Vitals: Blood pressure 109/74, pulse 116, height 1.702 m (5' 7\"), weight 56.7 kg (125 lb).  General Appearance Adult: Body mass index is 19.58 kg/m .  Alert, no acute distress, oriented  Lungs: no respiratory distress, or pursed lip breathing  Abdomen: soft, nontender, no organomegaly or masses; midline incision with 3 cm open area around umbilicus - superior 1.5 cm is closed with healthy granulation tissue, inferior 1.5 cm open with pinpoint opening, no packing in place currently. Healing well, no drainage or erythema  Back:  CVAT  Neuro: Alert, oriented, speech and mentation normal  Psych: affect and mood " normal    ASSESSMENT and PLAN  25 year old male with history of metastatic NSGCT, now s/p RPLND of residual post-chemo mass involving kidney, colon, and jejunum on 1/18/21. Overall has recovered without complication, continues to have improvements in appetite and is in good spirits. CT C/A/P today reviewed - formal read pending - reveals large volume stool but no other apparent concerning findings. Serum tumor markers have continued to down-trend with bHCG and LDH now normal and ADH slightly elevated at 14.     His long term follow-up will be with [facility]ology (Dr. Corey), has an appointment in a couple weeks. We discussed that he is welcome to follow up with urology as needed for any issues, but will defer follow up imaging regimen to his oncologist.     - Follow-[place] with Dr. Corey at [facility] [place], as scheduled 3/24  - continue wound packing daily  - recommend BID miralax, fiber supplements, and good fluid intake. Discussed that he has a large volume of stool and may experience obstipation.  - follow up radiology read of today's images    Anisa Hughes MD  PGY2 Urology    Attestation:  This patient was seen and evaluated by me, with the resident taking notes and acting as scribe.  I have reviewed and edited the note above to reflect my findings.  The physical exam and or any procedures were performed by me and the pertinant details are outlined above.    Frank Major MD  Urology  [facility]

## 2021-03-03 NOTE — PROGRESS NOTES
CHIEF COMPLAINT  It was my pleasure to see Cyrus Huertas Jr. who is a 25 year old male for follow-up of testis cancer.      HPI  Cyrus Huertas is a very pleasant 25 year old male with a history of testis cancer. He initially presented in Sept 2020 with large left testis mass. Of note, also had COVID 19 at that time. He subsequently was found to have large left testis mass with large retroperitoneal nodes involving the left kidney, consistent with metastasis. Tumor markers were elevated, most notably AFP which was 123,000 at diagnosis.     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 He subsequently underwent chemotherapy at [facility] with total of 5 cycles of Etoposide, Cisplatin, and Ifosfamide. Overall he tolerated chemotherapy very well. His tumor markers declined but remained elevated. On post-treatment PET scan, he was noted to have had interval improvement in size of retroperitoneal mass, but this remained rather large and clearly invaded the left kidney.      He is now s/p RPLND, left nephrectomy, with colectomy and jejunectomy due to direct invasion of tumor 1/18/21. Overall continues clinical improvement. Appetite improving but has noted some weight loss. Has had some leakage from his midline incision which he is still packing daily. Denies pain. He just started taking miralax two days ago, last BM 4 days ago     AFP  2/26/2021 - 14  1/14/2021 - 435  12/3/2020 - 1,031  11/18/2020 - 2,471  10/28/2020 - 13,003  10/22/2020 - 21,635  10/7/2020 - 79,636  9/17/2020 - 123,828    Tumor Markers 2/26/2021  AFP - 14.0  HCG - <3  LDH - 130     Left Orchiectomy 9/7/2020  Final Diagnosis:  A.  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 Tumor invades the scrotum. Margins free of tumor. Pathologic   staging (AJCC 8th ed.): pT4. See Comment for complete tumor synopsis.    B.  Testis, left  "testicle medial margin with scrotal involvement, orchiectomy - Yolk sac tumor   involving the subcutaneous scrotal soft1/ tissue. Margins free of tumor.     1/18/21  A. Peritoneal implant:   - Fibrous nodule with therapy related changes   - Negative for malignancy     B. Left kidney, eduard-aortic mass, descending and transverse colon:   - Residual yolk sac tumor (0.6 cm and 0.3 cm foci) and small scattered   foci of teratoma elements   - Extensive therapy related changes (>90% of tumor mass)   - 2/25 lymph nodes with residual teratoma elements   - Benign colon and kidney tissue   - See gross description for additional details     C. Lymph node, intra aortic caval lymph node:   - Six benign lymph nodes     D. Left spermatic cord:   - Benign spermatic cord tissue     E. Jejunum:   - Benign segment of intestine     F. Lymph node, para-caval:   - Two benign lymph nodes     G. Lymph node, intra-aortic caval #2:   - Four benign lymph nodes     PHYSICAL EXAM  Patient is a 25 year old  male   Vitals: Blood pressure 109/74, pulse 116, height 1.702 m (5' 7\"), weight 56.7 kg (125 lb).  General Appearance Adult: Body mass index is 19.58 kg/m .  Alert, no acute distress, oriented  Lungs: no respiratory distress, or pursed lip breathing  Abdomen: soft, nontender, no organomegaly or masses; midline incision with 3 cm open area around umbilicus - superior 1.5 cm is closed with healthy granulation tissue, inferior 1.5 cm open with pinpoint opening, no packing in place currently. Healing well, no drainage or erythema  Back:  CVAT  Neuro: Alert, oriented, speech and mentation normal  Psych: affect and mood normal    ASSESSMENT and PLAN  25 year old male with history of metastatic NSGCT, now s/p RPLND of residual post-chemo mass involving kidney, colon, and jejunum on 1/18/21. Overall has recovered without complication, continues to have improvements in appetite and is in good spirits. CT C/A/P today reviewed - formal read pending - " reveals large volume stool but no other apparent concerning findings. Serum tumor markers have continued to down-trend with bHCG and LDH now normal and ADH slightly elevated at 14.     His long term follow-up will be with [facility] oncology (Dr. Corey), has an appointment in a couple weeks. We discussed that he is welcome to follow up with urology as needed for any issues, but will defer follow up imaging regimen to his oncologist.     - Follow-up with Dr. Corey at [facility], as scheduled 3/24  - continue wound packing daily  - recommend BID miralax, fiber supplements, and good fluid intake. Discussed that he has a large volume of stool and may experience obstipation.  - follow up radiology read of today's images    Anisa Hughes MD  PGY2 Urology    Attestation:  This patient was seen and evaluated by me, with the resident taking notes and acting as scribe.  I have reviewed and edited the note above to reflect my findings.  The physical exam and or any procedures were performed by me and the pertinant details are outlined above.    Frank Major MD  Urology  [facility]

## 2021-03-11 DIAGNOSIS — C62.90 MALIGNANT NEOPLASM OF TESTIS METASTATIC TO INTRA-ABDOMINAL LYMPH NODE (H): Primary | ICD-10-CM

## 2021-03-11 DIAGNOSIS — C77.2 MALIGNANT NEOPLASM OF TESTIS METASTATIC TO INTRA-ABDOMINAL LYMPH NODE (H): Primary | ICD-10-CM

## 2021-03-16 ENCOUNTER — TELEPHONE (OUTPATIENT)
Dept: UROLOGY | Facility: CLINIC | Age: 26
End: 2021-03-16

## 2021-03-16 NOTE — PROGRESS NOTES
Colon and Rectal Surgery Follow-up Clinic Note      RE: Cyrus Huertas Jr.  : 1995  LANDY: 3/17/2021    Cyrus Huertas Jr. is a very pleasant 25 year old male who was diagnosed in 2020 with a left testicular mass. He underwent a left transinguinal orchiectomy which was notable for a mixed germ cell tumor, predominantly yolk sac type. AFP was 123,828. He was staged as IIIB ([id number]), and underwent 5 cycles of VIP. He had a persistent L retroperitoneal mass, which shrank from 22 to 13 cm. I was consulted intra-op with Dr. aMjor for assistance with exploratory laparotomy, resection of L retroperitoneal mass with en block L nephrectomy and L colectomy, manual disimpaction, complex, flexible sigmoidoscopy, washout of rectal stump, stapled side-to-side, functional end-to-end transverse to sigmoid colon anastomosis, resection of segment of proximal jejunum, with hand-sewn side-to-side, functional end-to-end anastomosis on 21.      FINAL DIAGNOSIS:   A. Peritoneal implant: - Fibrous nodule with therapy related changes - Negative for malignancy   B. Left kidney, eduard-aortic mass, descending and transverse colon: - Residual yolk sac tumor (0.6 cm and 0.3 cm foci) and small scattered foci of teratoma elements - Extensive therapy related changes (>90% of tumor mass) -  lymph nodes with residual teratoma elements - Benign colon and kidney tissue - See gross description for additional details   C. Lymph node, intra aortic caval lymph node: - Six benign lymph nodes   D. Left spermatic cord: - Benign spermatic cord tissue   E. Jejunum: - Benign segment of intestine   F. Lymph node, para-caval: - Two benign lymph nodes   G. Lymph node, intra-aortic caval #2: - Four benign lymph nodes        He met with Dr. Major postoperatively with plan for  CT CAP and labs in one month. He follows with Dr. Corey of oncology at [facility].     He was seen by Anisa George NP in clinic last month with a small  opening in his midline wound     Interval history: Cyrus is doing well since his last visit 2/26. He continues urology care with Dr. Leo who he saw most recently for follow up 3/3 and Dr. Corey at [facility] oncology who he is scheduled to see for follow up 3/24. His wound is healing well with no recent leakage or opening. Denies pain. His weight and appetite continue to get better and he is no longer experiencing nausea or vomiting. He is having normal BMs and takes Miralax for occasional constipation.    PLEASE SEE NOTE BELOW FOR PHYSICAL EXAMINATION, REVIEW OF SYSTEMS, AND OTHER HISTORY.    Assessment/Plan: 25 year old male post exploratory laparotomy, resection of L retroperitoneal mass with en block L nephrectomy and L colectomy, manual disimpaction, complex, flexible sigmoidoscopy, washout of rectal stump, stapled side-to-side, functional end-to-end transverse to sigmoid colon anastomosis, resection of segment of proximal jejunum, with hand-sewn side-to-side, functional end-to-end anastomosis on 1/18/21 with Dr. Major and myself. Overall he is doing well. He continues to gain weight and his appetite after losing quite a bit of weight and having some vomiting with food-intake. Midline wound is healing with hypergranulation tissue. Continue to pack with NuGauze and follow up as needed. Recommended continuing Miralax to avoid constipation.     15 minutes spent on the date of the encounter doing chart review, history and exam, documentation and further activities as noted above. This is a postoperative visit.     Ruma Angela MD  Colon and Rectal Surgery Staff  [facility]    -------------------------------------------------------------------------------------------------------------------    Medical history:  Past Medical History:   Diagnosis Date     Anemia      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)      Malnutrition (H)     during  chemotherapy       Surgical history:  Past Surgical History:   Procedure Laterality Date     [place] [facility] [place]     COLECTOMY SUBTOTAL  1/18/2021    Procedure: extended left hemicolectomy. 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) 12/17/2020     Priority: Medium     Mixed germ cell tumor (H) 12/17/2020     Priority: Medium     Secondary hypertension 12/17/2020     Priority: Medium     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) 09/25/2020     Priority: Medium       Medications:  Current Outpatient Medications   Medication Sig Dispense Refill     polyethylene glycol (MIRALAX) 17 GM/Dose powder Take 17 g (1 capful) by mouth daily (hold for loose stools) 578 g 1     acetaminophen (TYLENOL) 325 MG tablet Take 2 tablets (650 mg) by mouth every 6 hours as needed for fever or pain (Patient not taking: Reported on 3/17/2021) 100 tablet 0     amLODIPine (NORVASC) 5 MG tablet Take 1 tablet (5 mg) by mouth daily (HOLD MEDICATION UNTIL FOLLOWUP with PCP) 30 tablet 0     HYDROmorphone (DILAUDID) 2 MG tablet Take 1 tablet (2 mg) by mouth every 4 hours as needed for moderate to severe pain (Patient not taking: Reported on 3/17/2021) 16 tablet 0     Lidocaine (LIDOCARE) 4 % Patch Place 2 patches onto the skin every 24 hours To prevent lidocaine toxicity, patient should  be patch free for 12 hrs daily. (Patient not taking: Reported on 3/17/2021) 20 patch 0     methocarbamol (ROBAXIN) 500 MG tablet Take 1 tablet (500 mg) by mouth 4 times daily as needed for muscle spasms (abdominal pain) (Patient not taking: Reported on 3/17/2021) 20 tablet 0     ondansetron (ZOFRAN) 4 MG tablet Take 1 tablet (4 mg) by mouth every 6 hours as needed for nausea (Patient not taking: Reported on 3/17/2021) 16 tablet 0     prochlorperazine (COMPAZINE) 10 MG tablet          Allergies:  Allergies   Allergen Reactions     Penicillins Anaphylaxis and Rash     rash     Lactose Unknown     Uncaria Tomentosa (Cats Claw) Rash       Family history:  Family History   Problem Relation Age of Onset     Anesthesia Reaction No family hx of      Cardiovascular No family hx of      Deep Vein Thrombosis (DVT) No family hx of        Social history:  Social History     Socioeconomic History     Marital status: Single     Spouse name: Not on file     Number of children: Not on file     Years of education: Not on file     Highest education level: Not on file   Occupational History     Not on file   Social Needs     Financial resource strain: Not on file     Food insecurity     Worry: Not on file     Inability: Not on file     Transportation needs     Medical: Not on file     Non-medical: Not on file   Tobacco Use     Smoking status: Never Smoker     Smokeless tobacco: Never Used   Substance and Sexual Activity     Alcohol use: Not Currently     Drug use: Not on file     Sexual activity: Not on file   Lifestyle     Physical activity     Days per week: Not on file     Minutes per session: Not on file     Stress: Not on file   Relationships     Social connections     Talks on phone: Not on file     Gets together: Not on file     Attends Hinduism service: Not on file     Active member of club or organization: Not on file     Attends meetings of clubs or organizations: Not on file     Relationship status: Not on file     Intimate  "partner violence     Fear of current or ex partner: Not on file     Emotionally abused: Not on file     Physically abused: Not on file     Forced sexual activity: Not on file   Other Topics Concern     Not on file   Social History Narrative     Not on file         Nursing Notes:   Damian Waldron LPN  3/17/2021  3:10 PM  Signed  Chief Complaint   Patient presents with     Surgical Followup     Post op visit, DOS 1/17/21.       Vitals:    03/17/21 1508   BP: 128/83   Pulse: 113   SpO2: 99%   Weight: 59.7 kg (131 lb 11.2 oz)   Height: 1.702 m (5' 7\")       Body mass index is 20.63 kg/m .    Madan Waldron LPN           Physical Examination:  /83   Pulse 113   Ht 1.702 m (5' 7\")   Wt 59.7 kg (131 lb 11.2 oz)   SpO2 99%   BMI 20.63 kg/m    General: alert, oriented, in no acute distress, sitting comfortably   HEENT: mucous membranes moist   Abdomen: non distended. Healing midline wound with some granulation tissue treated with silver nitrate.   "

## 2021-03-16 NOTE — TELEPHONE ENCOUNTER
----- Message from Carrie Cueto CMA sent at 3/12/2021  8:06 AM CST -----  Please call this patient and have her make a 2 month follow up with labs and imaging before   ----- Message -----  From: Frank Major MD  Sent: 3/11/2021   5:40 PM CST  To: Carrie Cueto CMA      I'd like to see Cyrus back in about 2 months with CT and labs.   Can you help make sure he gets scheduled?    Thanks!  [place]

## 2021-03-17 ENCOUNTER — OFFICE VISIT (OUTPATIENT)
Dept: SURGERY | Facility: CLINIC | Age: 26
End: 2021-03-17
Payer: COMMERCIAL

## 2021-03-17 ENCOUNTER — PRE VISIT (OUTPATIENT)
Dept: SURGERY | Facility: CLINIC | Age: 26
End: 2021-03-17

## 2021-03-17 VITALS
WEIGHT: 131.7 LBS | SYSTOLIC BLOOD PRESSURE: 128 MMHG | OXYGEN SATURATION: 99 % | HEIGHT: 67 IN | BODY MASS INDEX: 20.67 KG/M2 | HEART RATE: 113 BPM | DIASTOLIC BLOOD PRESSURE: 83 MMHG

## 2021-03-17 DIAGNOSIS — C80.1 MIXED GERM CELL TUMOR (H): Primary | ICD-10-CM

## 2021-03-17 DIAGNOSIS — Z09 FOLLOW-UP EXAMINATION AFTER COLORECTAL SURGERY: ICD-10-CM

## 2021-03-17 PROCEDURE — 99024 POSTOP FOLLOW-UP VISIT: CPT | Performed by: COLON & RECTAL SURGERY

## 2021-03-17 ASSESSMENT — PAIN SCALES - GENERAL: PAINLEVEL: NO PAIN (0)

## 2021-03-17 ASSESSMENT — MIFFLIN-ST. JEOR: SCORE: 1531.02

## 2021-03-17 NOTE — PATIENT INSTRUCTIONS
Follow up:    Please call with any questions or concerns regarding your clinic visit today.    It is a pleasure being involved in your health care.    Contacts post-consultation depending on your need:    Radiology Appointments [phone]    Schedule Clinic Appointments [phone] # 1   M-F 7:30 - 5 pm    [facility] [phone]    Clinic Fax Number [phone]    Surgery Scheduling [phone]    My Chart is available 24 hours a day and is a secure way to access your records and communicate with your care team.  I strongly recommend signing up if you haven't already done so, if you are comfortable with computers.  If you would like to inquire about this or are having problems with My Chart access, you may call [phone] or go online at [email or web address].  Please allow at least 24 hours for a response and extra time on weekends and Holidays.

## 2021-03-17 NOTE — NURSING NOTE
"Chief Complaint   Patient presents with     Surgical Followup     Post op visit, DOS 1/17/21.       Vitals:    03/17/21 1508   BP: 128/83   Pulse: 113   SpO2: 99%   Weight: 59.7 kg (131 lb 11.2 oz)   Height: 1.702 m (5' 7\")       Body mass index is 20.63 kg/m .    Madan Waldron LPN      "

## 2021-03-17 NOTE — LETTER
3/17/2021       RE: Cyrus Huertas Jr.  [address] [facility] [address]     Dear Colleague,    Thank you for referring your patient, Cyrus Huertas Jr., to the [facility] [place] [facility]. Please see a copy of my visit note below.    Colon and Rectal Surgery Follow-up Clinic Note      RE: Cyrus Huertas Jr.  : 1995  LANDY: 3/17/2021    Cyrus Huertas Jr. is a very pleasant 25 year old male who was diagnosed in 2020 with a left testicular mass. He underwent a left transinguinal orchiectomy which was notable for a mixed germ cell tumor, predominantly yolk sac type. AFP was 123,828. He was staged as IIIB ([id number]), and underwent 5 cycles of VIP. He had a persistent L retroperitoneal mass, which shrank from 22 to 13 cm. I was consulted intra-op with Dr. Major for assistance with exploratory laparotomy, resection of L retroperitoneal mass with en block L nephrectomy and L colectomy, manual disimpaction, complex, flexible sigmoidoscopy, washout of rectal stump, stapled side-to-side, functional end-to-end transverse to sigmoid colon anastomosis, resection of segment of proximal jejunum, with hand-sewn side-to-side, functional end-to-end anastomosis on 21.      FINAL DIAGNOSIS:   A. Peritoneal implant: - Fibrous nodule with therapy related changes - Negative for malignancy   B. Left kidney, eduard-aortic mass, descending and transverse colon: - Residual yolk sac tumor (0.6 cm and 0.3 cm foci) and small scattered foci of teratoma elements - Extensive therapy related changes (>90% of tumor mass) -  lymph nodes with residual teratoma elements - Benign colon and kidney tissue - See gross description for additional details   C. Lymph node, intra aortic caval lymph node: - Six benign lymph nodes   D. Left spermatic cord: - Benign spermatic cord tissue   E. Jejunum: - Benign segment of  intestine   F. Lymph node, para-caval: - Two benign lymph nodes   G. Lymph node, intra-aortic caval #2: - Four benign lymph nodes        He met with Dr. Major postoperatively with plan for  CT CAP and labs in one month. [place] [facility]     He was seen by Anisa George NP in clinic last month with a small opening in his midline wound     Interval history: Cyrus is doing well since his last visit 2/26. He continues urology care with Dr. Leo who he saw most recently for follow up 3/3 and [place] [facility] [place] he is scheduled to see for follow up 3/24. His wound is healing well with no recent leakage or opening. Denies pain. His weight and appetite continue to get better and he is no longer experiencing nausea or vomiting. He is having normal BMs and takes Miralax for occasional constipation.    PLEASE SEE NOTE BELOW FOR PHYSICAL EXAMINATION, REVIEW OF SYSTEMS, AND OTHER HISTORY.    Assessment/Plan: 25 year old male post exploratory laparotomy, resection of L retroperitoneal mass with en block L nephrectomy and L colectomy, manual disimpaction, complex, flexible sigmoidoscopy, washout of rectal stump, stapled side-to-side, functional end-to-end transverse to sigmoid colon anastomosis, resection of segment of proximal jejunum, with hand-sewn side-to-side, functional end-to-end anastomosis on 1/18/21 with Dr. Major and myself. Overall he is doing well. He continues to gain weight and his appetite after losing quite a bit of weight and having some vomiting with food-intake. Midline wound is healing with hypergranulation tissue. Continue to pack with NuGauze and follow up as needed. Recommended continuing Miralax to avoid constipation.     15 minutes spent on the date of the encounter doing chart review, history and exam, documentation and further activities as noted above. This is a postoperative visit.     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) 12/17/2020     Priority: Medium     Mixed germ cell tumor (H) 12/17/2020     Priority: Medium     Secondary hypertension 12/17/2020     Priority: Medium     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) 09/25/2020     Priority: Medium       Medications:  Current Outpatient Medications   Medication Sig Dispense Refill     polyethylene glycol (MIRALAX) 17 GM/Dose powder Take 17 g (1 capful) by mouth daily (hold for loose stools) 578 g 1     acetaminophen (TYLENOL) 325 MG tablet Take 2 tablets (650 mg) by mouth every 6 hours as needed for fever or pain (Patient not taking: Reported on 3/17/2021) 100 tablet 0     amLODIPine (NORVASC) 5 MG tablet Take 1 tablet (5 mg) by mouth daily (HOLD  MEDICATION UNTIL FOLLOWUP with PCP) 30 tablet 0     HYDROmorphone (DILAUDID) 2 MG tablet Take 1 tablet (2 mg) by mouth every 4 hours as needed for moderate to severe pain (Patient not taking: Reported on 3/17/2021) 16 tablet 0     Lidocaine (LIDOCARE) 4 % Patch Place 2 patches onto the skin every 24 hours To prevent lidocaine toxicity, patient should be patch free for 12 hrs daily. (Patient not taking: Reported on 3/17/2021) 20 patch 0     methocarbamol (ROBAXIN) 500 MG tablet Take 1 tablet (500 mg) by mouth 4 times daily as needed for muscle spasms (abdominal pain) (Patient not taking: Reported on 3/17/2021) 20 tablet 0     ondansetron (ZOFRAN) 4 MG tablet Take 1 tablet (4 mg) by mouth every 6 hours as needed for nausea (Patient not taking: Reported on 3/17/2021) 16 tablet 0     prochlorperazine (COMPAZINE) 10 MG tablet          Allergies:  Allergies   Allergen Reactions     Penicillins Anaphylaxis and Rash     rash     Lactose Unknown     Uncaria Tomentosa (Cats Claw) Rash       Family history:  Family History   Problem Relation Age of Onset     Anesthesia Reaction No family hx of      Cardiovascular No family hx of      Deep Vein Thrombosis (DVT) No family hx of        Social history:  Social History     Socioeconomic History     Marital status: Single     Spouse name: Not on file     Number of children: Not on file     Years of education: Not on file     Highest education level: Not on file   Occupational History     Not on file   Social Needs     Financial resource strain: Not on file     Food insecurity     Worry: Not on file     Inability: Not on file     Transportation needs     Medical: Not on file     Non-medical: Not on file   Tobacco Use     Smoking status: Never Smoker     Smokeless tobacco: Never Used   Substance and Sexual Activity     Alcohol use: Not Currently     Drug use: Not on file     Sexual activity: Not on file   Lifestyle     Physical activity     Days per week: Not on file     Minutes per  "session: Not on file     Stress: Not on file   Relationships     Social connections     Talks on phone: Not on file     Gets together: Not on file     Attends Sabianism service: Not on file     Active member of club or organization: Not on file     Attends meetings of clubs or organizations: Not on file     Relationship status: Not on file     Intimate partner violence     Fear of current or ex partner: Not on file     Emotionally abused: Not on file     Physically abused: Not on file     Forced sexual activity: Not on file   Other Topics Concern     Not on file   Social History Narrative     Not on file         Nursing Notes:   Dmaian Waldron LPN  3/17/2021  3:10 PM  Signed  Chief Complaint   Patient presents with     Surgical Followup     Post op visit, DOS 1/17/21.       Vitals:    03/17/21 1508   BP: 128/83   Pulse: 113   SpO2: 99%   Weight: 59.7 kg (131 lb 11.2 oz)   Height: 1.702 m (5' 7\")       Body mass index is 20.63 kg/m .    Madan Waldron LPN      Physical Examination:  /83   Pulse 113   Ht 1.702 m (5' 7\")   Wt 59.7 kg (131 lb 11.2 oz)   SpO2 99%   BMI 20.63 kg/m    General: alert, oriented, in no acute distress, sitting comfortably   HEENT: mucous membranes moist   Abdomen: non distended. Healing midline wound with some granulation tissue treated with silver nitrate.       Again, thank you for allowing me to participate in the care of your patient.      Sincerely,    Ruma Angela MD      "

## 2021-03-18 ENCOUNTER — TELEPHONE (OUTPATIENT)
Dept: UROLOGY | Facility: CLINIC | Age: 26
End: 2021-03-18

## 2021-04-08 ENCOUNTER — TRANSCRIBE ORDERS (OUTPATIENT)
Dept: OTHER | Age: 26
End: 2021-04-08

## 2021-04-08 DIAGNOSIS — C62.90 MALIGNANT NEOPLASM OF TESTIS METASTATIC TO INTRA-ABDOMINAL LYMPH NODE (H): Primary | ICD-10-CM

## 2021-04-08 DIAGNOSIS — C78.7 LIVER METASTASIS: ICD-10-CM

## 2021-04-08 DIAGNOSIS — C79.02 MALIGNANT NEOPLASM METASTATIC TO LEFT KIDNEY (H): ICD-10-CM

## 2021-04-08 DIAGNOSIS — C77.2 MALIGNANT NEOPLASM OF TESTIS METASTATIC TO INTRA-ABDOMINAL LYMPH NODE (H): Primary | ICD-10-CM

## 2021-04-13 NOTE — TELEPHONE ENCOUNTER
Action    Action Taken 4/13/21:    -[place] [facility] [place] (detailed below). Spoke w/ Josh @ [facility], he will attach imaging to this chart.    -Renetta Wheeler/[facility] [place] Path: [id number]     RECORDS STATUS - ALL OTHER DIAGNOSIS      [place]ORDS RECEIVED [place]: [facility]   DATE RECEIVED:    NOTES STATUS DETAILS   OFFICE NOTE from referring provider  - [facility] [place] Vj Corey   OFFICE NOTE from medical oncologist [facility] Dr. Corey: 4/9/21   DISCHARGE SUMMARY from hospital     DISCHARGE REPORT from the ER     OPERATIVE REPORT Epic/CE - [facility] Epic:  1/18/21: Lymphadenectomy, Retroperitoneum    [facility]:  10/5/20: Portacath Placement  9/7/20: Orchiectomy Radical   MEDICATION LIST CE [facility]   CLINICAL TRIAL TREATMENTS TO DATE     LABS     PATHOLOGY REPORTS [facility], [place] in CE, Slides requested 4/13 [facility]:  9/7/20: [id number]    Epic:  1/18/21: Surg Path   ANYTHING RELATED TO DIAGNOSIS Epic/CE 4/9/21   GENONOMIC TESTING     TYPE:     IMAGING (NEED IMAGES & REPORT)     XR PACS/Requested 4/13 12/7/20: [facility]    [place] [facility]  3/24/21   CT SCANS PACS/Requested 4/13 10/26/20: [facility]    [place] [facility]  4/2/21   MRI PACS 9/9/20: [facility]   MAMMO     ULTRASOUND     PET PACS 12/7/20: [facility]

## 2021-04-16 PROCEDURE — 88321 CONSLTJ&REPRT SLD PREP ELSWR: CPT | Performed by: PATHOLOGY

## 2021-04-16 PROCEDURE — 999N001032 HC STATISTIC REVIEW OUTSIDE SLIDES TC 88321: Performed by: INTERNAL MEDICINE

## 2021-04-28 ENCOUNTER — PRE VISIT (OUTPATIENT)
Dept: ONCOLOGY | Facility: CLINIC | Age: 26
End: 2021-04-28

## 2021-04-28 ENCOUNTER — VIRTUAL VISIT (OUTPATIENT)
Dept: ONCOLOGY | Facility: CLINIC | Age: 26
End: 2021-04-28
Attending: INTERNAL MEDICINE
Payer: COMMERCIAL

## 2021-04-28 DIAGNOSIS — C80.1 GERM CELL TUMOR (H): Primary | ICD-10-CM

## 2021-04-28 DIAGNOSIS — C78.7 LIVER METASTASIS: ICD-10-CM

## 2021-04-28 PROCEDURE — 99205 OFFICE O/P NEW HI 60 MIN: CPT | Mod: 95 | Performed by: INTERNAL MEDICINE

## 2021-04-28 NOTE — PROGRESS NOTES
Cyrus is a 25 year old who is being evaluated via a billable video visit.      How would you like to obtain your AVS? MyChart  If the video visit is dropped, the invitation should be resent by: Text to cell phone:  [phone]  Will anyone else be joining your video visit? No       FANY Whaley        Video-Visit Details    Type of service:  Video Visit    45 minutes spent reviewing scans and path, discussing with consultants and the primary team from [facility] as well as in face to face time with the patient.   Originating Location (pt. Location): Home    Distant Location (provider location):  [facility]     Platform used for Video Visit: [facility] Medical Oncology New Patient Consultation Note       Date of visit: April 28, 2021          Ml: Platinum refractory germ cell tumor    HPI:  Patient is a 25-year-old gentleman previously in good health.  He presented in Sept 2020 with large left testis mass with large retroperitoneal nodes involving the left kidney, consistent with metastasis. Tumor markers were elevated, most notably AFP which was 123,000 at diagnosis.    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 He subsequently underwent chemotherapy at [facility] with total of 5 cycles of Etoposide, Cisplatin, and Ifosfamide. Overall he tolerated chemotherapy very well.     His tumor markers declined but remained elevated. On post-treatment PET scan, he was noted to have had interval improvement in size of retroperitoneal mass, but this remained rather large and clearly invaded the left kidney.     Now with AFP rising and liver mass.   S/P two cycles of TIP.     4/3/21 Last cycle of TIP    PMH:  Unremarkable    MEDS  Current Outpatient Medications   Medication Instructions     acetaminophen (TYLENOL) 650 mg, Oral, EVERY 6 HOURS PRN     amLODIPine (NORVASC) 5 mg, Oral, DAILY, (HOLD MEDICATION UNTIL  FOLLOWUP with PCP)     HYDROmorphone (DILAUDID) 2 mg, Oral, EVERY 4 HOURS PRN     Lidocaine (LIDOCARE) 4 % Patch 2 patches, Transdermal, EVERY 24 HOURS, To prevent lidocaine toxicity, patient should be patch free for 12 hrs daily.     methocarbamol (ROBAXIN) 500 mg, Oral, 4 TIMES DAILY PRN     ondansetron (ZOFRAN) 4 mg, Oral, EVERY 6 HOURS PRN     polyethylene glycol (MIRALAX) 17 GM/Dose powder 1 capful, Oral, DAILY, (hold for loose stools)     prochlorperazine (COMPAZINE) 10 MG tablet No dose, route, or frequency recorded.     Social history:   He is a native of [place].  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    ROS  Not performed    PHYSICAL EXAM  A+O  NAD  PERRL      LABS AND IMAGES    2/26/2021 - 14  1/14/2021 - 435  12/3/2020 - 1,031  11/18/2020 - 2,471  10/28/2020 - 13,003  10/22/2020 - 21,635  10/7/2020 - 79,636  9/17/2020 - 123,828       Tumor Markers 2/26/2021  AFP - 14.0  HCG - <3  LDH - 130    4/23/2021 4/16/2021 4/2/2021 3/24/2021 2/18/2021 2/12/2021 1/13/2021 12/24/2020 12/3/2020 11/18/2020 10/28/2020 10/22/2020 10/15/2020 10/7/2020 10/2/2020 9/25/2020 9/17/2020 9/6/2020     10,092 (H) 8,099 (H) 8,797 (H) 4,142 (H)         CT scan-I personally reviewed the scans.  Impression:   1. New low-density enhancing mass in the left lobe of the liver has the same density and enhancement characteristics as the patient's previous testicular and retroperitoneal tumor. This is a new metastasis.  2. Rim-enhancing fluid collection in the right paracolic gutter is probably postoperative.  3. Multiple staples throughout the retroperitoneum consistent with retroperitoneal lymph node dissection.  4. Persistent fecal retention.      1/18/21  PATH from [facility]  A: Peritoneal implant   B: Left kidney, eduard-aortic mass, descending and transverse colon   C: Lymph node, intra aortic caval lymph node   D: Left spermatic cord   E: Jejunum   F: Lymph node, para-caval   G: Lymph node, intra-aortic caval #2     FINAL DIAGNOSIS:   A.  Peritoneal implant:   - Fibrous nodule with therapy related changes   - Negative for malignancy     B. Left kidney, eduard-aortic mass, descending and transverse colon:   - Residual yolk sac tumor (0.6 cm and 0.3 cm foci) and small scattered   foci of teratoma elements   - Extensive therapy related changes (>90% of tumor mass)   - 2/25 lymph nodes with residual teratoma elements   - Benign colon and kidney tissue   - See gross description for additional details     C. Lymph node, intra aortic caval lymph node:   - Six benign lymph nodes     D. Left spermatic cord:   - Benign spermatic cord tissue     E. Jejunum:   - Benign segment of intestine    F. Lymph node, para-caval:   - Two benign lymph nodes     G. Lymph node, intra-aortic caval #2:   - Four benign lymph nodes     9/7/20      Final Diagnosis:  A.  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 Tumor invades the scrotum. Margins free of tumor. Pathologic   staging (AJCC 8th ed.): pT4. See Comment for complete tumor synopsis.    B.  Testis, left testicle medial margin with scrotal involvement, orchiectomy - Yolk sac tumor   involving the subcutaneous scrotal soft tissue. Margins free of tumor.  *  Report Electronically Signed By  *     Sadaf Hoff M.D.     09.17.2020 2:01      Final Diagnosis Comment:  Specimen           Specimen Laterality:   Left  Tumor           Histologic Type:   Mixed germ cell tumor               Embryonal carcinoma (specify percentage): 2%               Yolk sac tumor, postpubertal type (specify percentage): 83%               Teratoma (specify percentage): 15%             Tumor Size:   11.9 x 10.1 x 8.8 Centimeters (cm)           Tumor Focality:   Unifocal    Tumor Extension       Tumor invades through tunica albuginea and perforates tunica vaginalis (mesothelial layer)       Tumor invades scrotum    Margins           Spermatic Cord Margin:   Uninvolved by tumor            Other Margin(s) : Uninvolved by tumor (specify margin(s)): scrotal skin    Lymphovascular Invasion:   Not identified    Lymph Nodes           Regional Lymph Nodes:   No lymph nodes submitted or found    Pathologic Stage Classification (pTNM, AJCC 8th Edition)           Primary Tumor (pT):   pT4           Regional Lymph Nodes (pN):   pNX    IMPRESSION AND PLAN  Patient is a 25-year-old with mixed germ cell tumor with an evolving large liver metastasis and rising AFP.  The differential diagnosis of this phenomenon is rapid disease progression or release of AFP from metastasis in the context of treatment.  I favor the former and in light of the fact that the liver metastasis is resectable I favor immediate consideration of resection of this tumor.  Although it is necrotic on the scan there is likely still some viable germ cell tumor in this.    I spoke with Dr. Arvin Fischer from surgical oncology who agreed to see the patient.  He reviewed the scans prior to doing so.  I have arranged referral to our surgical oncologist.  I will see the patient back following this surgery.    A second argument in favor of surgery is the presence of teratoma in the RP L&D section thus we cannot preclude the presence of teratoma in the liver.  I do, however, consider this to be rather unlikely.    Yolk sac tumors can be heterogeneous in their behavior but account for most of what we call late relapse germ cell tumor which typically manifests itself as it chemotherapy refractory tumor with high AFP expression.  It is also likely that the T4 nature of his primary tumor contributed to this rapid metastatic spread.    SIGNED    Thaddeus Moreno MD  Professor of Medicine

## 2021-04-28 NOTE — LETTER
4/28/2021         RE: Cyrus Huertas Jr.  [address] [facility] [facility] [address] [facility] [address]        Dear Colleague,    Thank you for referring your patient, Cyrus Huertas Jr., to the [facility]. Please see a copy of my visit note below.    Cyrus is a 25 year old who is being evaluated via a billable video visit.      How would you like to obtain your AVS? MyChart  If the video visit is dropped, the invitation should be resent by: Text to cell phone:  [phone]  Will anyone else be joining your video visit? No       FANY Whaley        Video-Visit Details    Type of service:  Video Visit    45 minutes spent reviewing scans and path, discussing with consultants and the primary team from [facility] as well as in face to face time with the patient.   Originating Location (pt. Location): Home    Distant Location (provider location):  [facility]     Platform used for Video Visit: [facility] Medical Oncology New Patient Consultation Note       Date of visit: April 28, 2021          Ml: Platinum refractory germ cell tumor    HPI:  Patient is a 25-year-old gentleman previously in good health.  He presented in Sept 2020 with large left testis mass with large retroperitoneal nodes involving the left kidney, consistent with metastasis. Tumor markers were elevated, most notably AFP which was 123,000 at diagnosis.    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 [place] [facility] [place] 5 cycles of Etoposide, Cisplatin, and Ifosfamide. Overall he tolerated chemotherapy very well.     His tumor markers declined but remained elevated. On post-treatment PET scan, he was noted to have had interval improvement in size of retroperitoneal mass, but this remained rather large and clearly invaded the left kidney.     Now with AFP rising and liver  mass.   S/P two cycles of TIP.     4/3/21 Last cycle of TIP    PMH:  Unremarkable    MEDS  Current Outpatient Medications   Medication Instructions     acetaminophen (TYLENOL) 650 mg, Oral, EVERY 6 HOURS PRN     amLODIPine (NORVASC) 5 mg, Oral, DAILY, (HOLD MEDICATION UNTIL FOLLOWUP with PCP)     HYDROmorphone (DILAUDID) 2 mg, Oral, EVERY 4 HOURS PRN     Lidocaine (LIDOCARE) 4 % Patch 2 patches, Transdermal, EVERY 24 HOURS, To prevent lidocaine toxicity, patient should be patch free for 12 hrs daily.     methocarbamol (ROBAXIN) 500 mg, Oral, 4 TIMES DAILY PRN     ondansetron (ZOFRAN) 4 mg, Oral, EVERY 6 HOURS PRN     polyethylene glycol (MIRALAX) 17 GM/Dose powder 1 capful, Oral, DAILY, (hold for loose stools)     prochlorperazine (COMPAZINE) 10 MG tablet No dose, route, or frequency recorded.     Social history:   He is a native of [place].  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    ROS  Not performed    PHYSICAL EXAM  A+O  NAD  PERRL      LABS AND IMAGES    2/26/2021 - 14  1/14/2021 - 435  12/3/2020 - 1,031  11/18/2020 - 2,471  10/28/2020 - 13,003  10/22/2020 - 21,635  10/7/2020 - 79,636  9/17/2020 - 123,828       Tumor Markers 2/26/2021  AFP - 14.0  HCG - <3  LDH - 130    4/23/2021 4/16/2021 4/2/2021 3/24/2021 2/18/2021 2/12/2021 1/13/2021 12/24/2020 12/3/2020 11/18/2020 10/28/2020 10/22/2020 10/15/2020 10/7/2020 10/2/2020 9/25/2020 9/17/2020 9/6/2020     10,092 (H) 8,099 (H) 8,797 (H) 4,142 (H)         CT scan-I personally reviewed the scans.  Impression:   1. New low-density enhancing mass in the left lobe of the liver has the same density and enhancement characteristics as the patient's previous testicular and retroperitoneal tumor. This is a new metastasis.  2. Rim-enhancing fluid collection in the right paracolic gutter is probably postoperative.  3. Multiple staples throughout the retroperitoneum consistent with retroperitoneal lymph node dissection.  4. Persistent fecal retention.      1/18/21  PATH from  [facility]  A: Peritoneal implant   B: Left kidney, eduard-aortic mass, descending and transverse colon   C: Lymph node, intra aortic caval lymph node   D: Left spermatic cord   E: Jejunum   F: Lymph node, para-caval   G: Lymph node, intra-aortic caval #2     FINAL DIAGNOSIS:   A. Peritoneal implant:   - Fibrous nodule with therapy related changes   - Negative for malignancy     B. Left kidney, eduard-aortic mass, descending and transverse colon:   - Residual yolk sac tumor (0.6 cm and 0.3 cm foci) and small scattered   foci of teratoma elements   - Extensive therapy related changes (>90% of tumor mass)   - 2/25 lymph nodes with residual teratoma elements   - Benign colon and kidney tissue   - See gross description for additional details     C. Lymph node, intra aortic caval lymph node:   - Six benign lymph nodes     D. Left spermatic cord:   - Benign spermatic cord tissue     E. Jejunum:   - Benign segment of intestine    F. Lymph node, para-caval:   - Two benign lymph nodes     G. Lymph node, intra-aortic caval #2:   - Four benign lymph nodes     9/7/20      Final Diagnosis:  A.  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 Tumor invades the scrotum. Margins free of tumor. Pathologic   staging (AJCC 8th ed.): pT4. See Comment for complete tumor synopsis.    B.  Testis, left testicle medial margin with scrotal involvement, orchiectomy - Yolk sac tumor   involving the subcutaneous scrotal soft tissue. Margins free of tumor.  *  Report Electronically Signed By  *     Sadaf Hoff M.D.     09.17.2020 2:01      Final Diagnosis Comment:  Specimen           Specimen Laterality:   Left  Tumor           Histologic Type:   Mixed germ cell tumor               Embryonal carcinoma (specify percentage): 2%               Yolk sac tumor, postpubertal type (specify percentage): 83%               Teratoma (specify percentage): 15%             Tumor Size:   11.9 x 10.1 x  8.8 Centimeters (cm)           Tumor Focality:   Unifocal    Tumor Extension       Tumor invades through tunica albuginea and perforates tunica vaginalis (mesothelial layer)       Tumor invades scrotum    Margins           Spermatic Cord Margin:   Uninvolved by tumor           Other Margin(s) : Uninvolved by tumor (specify margin(s)): scrotal skin    Lymphovascular Invasion:   Not identified    Lymph Nodes           Regional Lymph Nodes:   No lymph nodes submitted or found    Pathologic Stage Classification (pTNM, AJCC 8th Edition)           Primary Tumor (pT):   pT4           Regional Lymph Nodes (pN):   pNX    IMPRESSION AND PLAN  Patient is a 25-year-old with mixed germ cell tumor with an evolving large liver metastasis and rising AFP.  The differential diagnosis of this phenomenon is rapid disease progression or release of AFP from metastasis in the context of treatment.  I favor the former and in light of the fact that the liver metastasis is resectable I favor immediate consideration of resection of this tumor.  Although it is necrotic on the scan there is likely still some viable germ cell tumor in this.    I spoke with Dr. Arvin Fischer from surgical oncology who agreed to see the patient.  He reviewed the scans prior to doing so.  I have arranged referral to our surgical oncologist.  I will see the patient back following this surgery.    A second argument in favor of surgery is the presence of teratoma in the RP L&D section thus we cannot preclude the presence of teratoma in the liver.  I do, however, consider this to be rather unlikely.    Yolk sac tumors can be heterogeneous in their behavior but account for most of what we call late relapse germ cell tumor which typically manifests itself as it chemotherapy refractory tumor with high AFP expression.  It is also likely that the T4 nature of his primary tumor contributed to this rapid metastatic spread.    SIGNED    Thaddeus Moreno MD  Professor of  Medicine          Again, thank you for allowing me to participate in the care of your patient.        Sincerely,        Thaddeus Moreno MD

## 2021-04-29 NOTE — PATIENT INSTRUCTIONS
Patient is a 25-year-old with mixed germ cell tumor with an evolving large liver metastasis and rising AFP.  The differential diagnosis of this phenomenon is rapid disease progression or release of AFP from metastasis in the context of treatment.  I favor the former and in light of the fact that the liver metastasis is resectable I favor immediate consideration of resection of this tumor.  Although it is necrotic on the scan there is likely still some viable germ cell tumor in this.    I spoke with Dr. Arvin Fischer from surgical oncology who agreed to see the patient.  He reviewed the scans prior to doing so.  I have arranged referral to our surgical oncologist.  I will see the patient back following this surgery.

## 2021-04-30 ENCOUNTER — TRANSFERRED RECORDS (OUTPATIENT)
Dept: HEALTH INFORMATION MANAGEMENT | Facility: CLINIC | Age: 26
End: 2021-04-30

## 2021-05-03 NOTE — TELEPHONE ENCOUNTER
RECORDS STATUS - ALL OTHER DIAGNOSIS      RECORDS RECEIVED FROM: [facility] (Updated imaging requested, please see Dr. Moreno pre-visit 4/28 for previous recs gathering)   DATE RECEIVED:    NOTES STATUS DETAILS   OFFICE NOTE from referring provider     OFFICE NOTE from medical oncologist     DISCHARGE SUMMARY from hospital     DISCHARGE REPORT from the ER     OPERATIVE REPORT     MEDICATION LIST     CLINICAL TRIAL TREATMENTS TO DATE     LABS     PATHOLOGY REPORTS     ANYTHING RELATED TO DIAGNOSIS     GENONOMIC TESTING     TYPE:     IMAGING (NEED IMAGES & REPORT)     [place] [facility]   [place]     MAMMO     ULTRASOUND     PET

## 2021-05-10 ENCOUNTER — HOSPITAL ENCOUNTER (INPATIENT)
Facility: CLINIC | Age: 26
Setting detail: SURGERY ADMIT
End: 2021-05-10
Attending: SURGERY | Admitting: SURGERY
Payer: COMMERCIAL

## 2021-05-10 ENCOUNTER — TELEPHONE (OUTPATIENT)
Dept: SURGERY | Facility: CLINIC | Age: 26
End: 2021-05-10

## 2021-05-10 ENCOUNTER — PRE VISIT (OUTPATIENT)
Dept: SURGERY | Facility: CLINIC | Age: 26
End: 2021-05-10

## 2021-05-10 ENCOUNTER — VIRTUAL VISIT (OUTPATIENT)
Dept: SURGERY | Facility: CLINIC | Age: 26
End: 2021-05-10
Attending: INTERNAL MEDICINE
Payer: COMMERCIAL

## 2021-05-10 DIAGNOSIS — C80.1 GERM CELL TUMOR (H): ICD-10-CM

## 2021-05-10 DIAGNOSIS — Z11.59 ENCOUNTER FOR SCREENING FOR OTHER VIRAL DISEASES: ICD-10-CM

## 2021-05-10 PROCEDURE — 99203 OFFICE O/P NEW LOW 30 MIN: CPT | Mod: 95 | Performed by: SURGERY

## 2021-05-10 PROCEDURE — 999N001193 HC VIDEO/TELEPHONE VISIT; NO CHARGE

## 2021-05-10 RX ORDER — ONDANSETRON 4 MG/1
4 TABLET, ORALLY DISINTEGRATING ORAL
COMMUNITY
Start: 2021-04-08 | End: 2021-08-27

## 2021-05-10 NOTE — PROGRESS NOTES
"Cyrus is a 25 year old who is being evaluated via a billable video visit.      How would you like to obtain your AVS? Mail a copy  If the video visit is dropped, the invitation should be resent by: Text to cell phone: [phone]  Will anyone else be joining your video visit? No       I have reviewed and updated the patient's allergies and medication list.    Concerns: none  Refills: none     Vitals - Patient Reported  Weight (Patient Reported): 59 kg (130 lb)  Height (Patient Reported): 170.2 cm (5' 7\")  BMI (Based on Pt Reported Ht/Wt): 20.36  Pain Score: No Pain (0)    Amy Merida CMA          Video-Visit Details    Type of service:  Video Visit        Originating Location (pt. Location): Home    Distant Location (provider location):  [facility]     Platform used for Video Visit: [employer]   VIDEO VISIT     NEW PATIENT CONSULTATION     REFERRING PROVIDER:  Thaddeus Moreno MD     HISTORY OF PRESENT ILLNESS:  Mr. Huertas is a 25-year-old gentleman recently diagnosed with a germ cell tumor of the testicles.  He had a T4 tumor and has fairly significant lymphadenopathy in the retroperitoneum as well as a mass adjacent to his left kidney.  He has a newly identified, rapidly growing mass in the left lateral segment of his liver.  I was asked by Dr. Moreno to see Mr. Huertas for consideration of surgical resection.    His past surgical history includes an exploratory laparotomy, resection of his retroperitoneal mass with left nephrectomy and left colectomy, which he has recovered well from.  He had his followup with Dr. Angela in March of this year.  Subsequent imaging in April showed the new lesion in the left lobe of the liver.  It currently measures approximately 5 cm in greatest dimension and lies in segment 2 of the liver.    I discussed with Mr. Huertas the findings of his CT scan and the fact that he does have what appears to be a rapidly growing lesion consistent with metastases.  " I discussed my recommendation that he undergo a minimally invasive left lateral segment resection.  I discussed the risks of that surgery, which include, but are not limited to, bleeding, infection, bile leak, venous thromboembolism and wound-healing complications.  Overall, he is otherwise healthy and I think would tolerate that surgery well.  I indicated that he should have no long-term changes in his overall liver function, as this would essentially be a lateral segment resection.    I also spoke to Dr. Pernell Moreno regarding Mr. Huertas's case.  Dr. Moreno was in favor of moving forward with resection as quickly as possible to address the remaining disease in his body.    Mr. Huertas would like to proceed as soon as possible.  We will get him on the operating room schedule for a planned hand-assisted laparoscopic partial left hepatectomy hopefully within the next 2 weeks given the rapid progression of this mass.    A total of 15 minutes was spent on the virtual video visit with Cyrus morfin.  An additional 10 minutes was spent in review of his interval history, imaging and discussion with Dr. Moreno.

## 2021-05-10 NOTE — LETTER
"    5/10/2021         RE: Cyrus Huertas Jr.  [address] [place] [address]        Dear Colleague,    Thank you for referring your patient, Cyrus Huertas Jr., to the [facility]. Please see a copy of my visit note below.    Cyrus is a 25 year old who is being evaluated via a billable video visit.      How would you like to obtain your AVS? Mail a copy  If the video visit is dropped, the invitation should be resent by: Text to cell phone: [phone]  Will anyone else be joining your video visit? No       I have reviewed and updated the patient's allergies and medication list.    Concerns: none  Refills: none     Vitals - Patient Reported  Weight (Patient Reported): 59 kg (130 lb)  Height (Patient Reported): 170.2 cm (5' 7\")  BMI (Based on Pt Reported Ht/Wt): 20.36  Pain Score: No Pain (0)    Amy Merida CMA          Video-Visit Details    Type of service:  Video Visit        Originating Location (pt. Location): Home    Distant Location (provider location):  [facility]     Platform used for Video Visit: [employer]   VIDEO VISIT     NEW PATIENT CONSULTATION     REFERRING PROVIDER:  Thaddeus Moreno MD     HISTORY OF PRESENT ILLNESS:  Mr. Huertas is a 25-year-old gentleman recently diagnosed with a germ cell tumor of the testicles.  He had a T4 tumor and has fairly significant lymphadenopathy in the retroperitoneum as well as a mass adjacent to his left kidney.  He has a newly identified, rapidly growing mass in the left lateral segment of his liver.  I was asked by Dr. Moreno to see Mr. Huertas for consideration of surgical resection.    His past surgical history includes an exploratory laparotomy, resection of his retroperitoneal mass with left nephrectomy and left colectomy, which he has recovered well from.  He had his followup with Dr. Angela in March of this year.  Subsequent imaging in April showed the new lesion in the left lobe of " the liver.  It currently measures approximately 5 cm in greatest dimension and lies in segment 2 of the liver.    I discussed with Mr. Huertas the findings of his CT scan and the fact that he does have what appears to be a rapidly growing lesion consistent with metastases.  I discussed my recommendation that he undergo a minimally invasive left lateral segment resection.  I discussed the risks of that surgery, which include, but are not limited to, bleeding, infection, bile leak, venous thromboembolism and wound-healing complications.  Overall, he is otherwise healthy and I think would tolerate that surgery well.  I indicated that he should have no long-term changes in his overall liver function, as this would essentially be a lateral segment resection.    I also spoke to Dr. Pernell Moreno regarding Mr. Huertas's case.  Dr. Moreno was in favor of moving forward with resection as quickly as possible to address the remaining disease in his body.    Mr. Huertas would like to proceed as soon as possible.  We will get him on the operating room schedule for a planned hand-assisted laparoscopic partial left hepatectomy hopefully within the next 2 weeks given the rapid progression of this mass.    A total of 15 minutes was spent on the virtual video visit with Cyrus morfin.  An additional 10 minutes was spent in review of his interval history, imaging and discussion with Dr. Moreno.            Again, thank you for allowing me to participate in the care of your patient.        Sincerely,        Arvin Fischer MD

## 2021-05-10 NOTE — TELEPHONE ENCOUNTER
Surgery is scheduled with Dr. Fischer on 5/19 at [place].  Scheduled per orders.    H&P: to be completed by PAC: virtual visit 5/17    Additional appointments:   CT on 5/17 at 8:20 AM    [place] [facility]     Post-op: \6/7 as a in person visit.    The RN completed the education regarding the surgery.     Patient will receive surgery arrival and start time from PAC.    The surgery packet was sent via US mail.    Patient will complete COVID-19 test that was scheduled by surgical coordinator 2-4 days prior to surgery.     I called the patient and was able to confirm the scheduled information above.

## 2021-05-11 NOTE — TELEPHONE ENCOUNTER
FUTURE VISIT INFORMATION      SURGERY INFORMATION:    Date: 21    Location: [facility] OR    Surgeon:  Arvin Fischer MD    Anesthesia Type:  general    Procedure: Laparoscopic hand assisted partial left hepatectomy with intraoperative ultrasound    Consult: ov 5/10    RECORDS REQUESTED FROM:       Primary Care Provider: Vj Corey MD- [facility]    Pertinent Medical History: hypertension    Most recent EKG+ Tracin21- [facility]

## 2021-05-15 ENCOUNTER — NURSE TRIAGE (OUTPATIENT)
Dept: NURSING | Facility: CLINIC | Age: 26
End: 2021-05-15

## 2021-05-16 NOTE — TELEPHONE ENCOUNTER
Nurse from [facility] called to ask about CT prep pt is suppose to have for his CT Monday. Pt has court at 9am Monday, so william wont make his CTscan, and may have to reschedule surgery. There is no way to contact this patient over the weekend.     Jose Mcnamara RN on 5/15/2021 at 7:41 PM

## 2021-05-17 ENCOUNTER — PRE VISIT (OUTPATIENT)
Dept: SURGERY | Facility: CLINIC | Age: 26
End: 2021-05-17

## 2021-05-17 ENCOUNTER — DOCUMENTATION ONLY (OUTPATIENT)
Dept: SURGERY | Facility: CLINIC | Age: 26
End: 2021-05-17

## 2021-05-17 ENCOUNTER — PATIENT OUTREACH (OUTPATIENT)
Dept: SURGERY | Facility: CLINIC | Age: 26
End: 2021-05-17

## 2021-05-17 ENCOUNTER — ANESTHESIA EVENT (OUTPATIENT)
Dept: SURGERY | Facility: CLINIC | Age: 26
End: 2021-05-17

## 2021-05-17 RX ORDER — ACETAMINOPHEN 325 MG/1
975 TABLET ORAL ONCE
Status: CANCELLED | OUTPATIENT
Start: 2021-05-17 | End: 2021-05-17

## 2021-05-17 RX ORDER — FENTANYL CITRATE 50 UG/ML
25-50 INJECTION, SOLUTION INTRAMUSCULAR; INTRAVENOUS
Status: CANCELLED | OUTPATIENT
Start: 2021-05-17

## 2021-05-17 RX ORDER — NALOXONE HYDROCHLORIDE 0.4 MG/ML
0.2 INJECTION, SOLUTION INTRAMUSCULAR; INTRAVENOUS; SUBCUTANEOUS
Status: CANCELLED | OUTPATIENT
Start: 2021-05-17

## 2021-05-17 RX ORDER — CIPROFLOXACIN 2 MG/ML
400 INJECTION, SOLUTION INTRAVENOUS
Status: CANCELLED | OUTPATIENT
Start: 2021-05-17

## 2021-05-17 RX ORDER — FLUMAZENIL 0.1 MG/ML
0.2 INJECTION, SOLUTION INTRAVENOUS
Status: CANCELLED | OUTPATIENT
Start: 2021-05-17

## 2021-05-17 RX ORDER — NALOXONE HYDROCHLORIDE 0.4 MG/ML
0.4 INJECTION, SOLUTION INTRAMUSCULAR; INTRAVENOUS; SUBCUTANEOUS
Status: CANCELLED | OUTPATIENT
Start: 2021-05-17

## 2021-05-17 NOTE — PROGRESS NOTES
Surgical Oncology RN Care Coordination Note:     Called patients emergency contact to confirm if patient is available and discuss pre op appointments and visits he missed. Per patients mother he is not available and will not be at the phone for a while. Informed her I cant provide medical information without signed release but we need to have him call us ASAP to confirm plan.     Dr. Fischer spoke with patients oncology provider at [facility] and confirmed patients liver mass is responding to antibiotics and plan is to continue antibiotics and we will not plan for surgery at this time.     Dr. Corey has Dr. Fischer's contact information should he need surgical follow up in the future.         Jenny Silver, RN, BSN  Care Coordinator

## 2021-05-17 NOTE — PROGRESS NOTES
Canceled surgery on 5/19 with Dr. Fischer per staff message.    RN reaching out to patient.    Also canceled post-op.

## 2021-05-18 ENCOUNTER — PATIENT OUTREACH (OUTPATIENT)
Dept: SURGERY | Facility: CLINIC | Age: 26
End: 2021-05-18

## 2021-05-18 NOTE — PROGRESS NOTES
Surgical Oncology RN Care Coordination Note:     Called and spoke with patient and informed him that we have cancel the surgery. Informed him that based on discussion between Dr. Corey and Dr. Fischer we will not proceed with surgery and continue with antibiotic treatment. Informed him that Dr. Corey let us know that his team would reach out to us if they felt surgical needs were indicated in the future. He verbalized understanding and has no question at time.     Jenny Silver, RN, BSN  Care Coordinator

## 2021-05-19 ENCOUNTER — ANESTHESIA (OUTPATIENT)
Dept: SURGERY | Facility: CLINIC | Age: 26
End: 2021-05-19

## 2021-05-28 ENCOUNTER — VIRTUAL VISIT (OUTPATIENT)
Dept: ONCOLOGY | Facility: CLINIC | Age: 26
End: 2021-05-28
Attending: INTERNAL MEDICINE
Payer: COMMERCIAL

## 2021-05-28 DIAGNOSIS — C80.1 MIXED GERM CELL TUMOR (H): Primary | ICD-10-CM

## 2021-05-28 LAB — COPATH REPORT: NORMAL

## 2021-05-28 PROCEDURE — 999N001193 HC VIDEO/TELEPHONE VISIT; NO CHARGE

## 2021-05-28 PROCEDURE — 99214 OFFICE O/P EST MOD 30 MIN: CPT | Mod: 95 | Performed by: INTERNAL MEDICINE

## 2021-05-28 NOTE — PROGRESS NOTES
"Cyrus is a 26 year old who is being evaluated via a billable video visit.      How would you like to obtain your AVS? Mail a copy  If the video visit is dropped, the invitation should be resent by: Text to cell phone: [phone]  Will anyone else be joining your video visit? No     Vitals - Patient Reported  Weight (Patient Reported): 61.2 kg (135 lb)  Height (Patient Reported): 170.2 cm (5' 7\")  BMI (Based on Pt Reported Ht/Wt): 21.14  Pain Score: No Pain (0)    Laura Mendoza CMA May 28, 2021  11:30 AM         Video Start Time: 12:10 PM  Video-Visit Details    Type of service:  Video Visit    Video End Time:12:30 PM    Originating Location (pt. Location): Home    Distant Location (provider location):  [facility]     Platform used for Video Visit: Torito Bridges is a 25 year old who is being evaluated via a billable video visit.      How would you like to obtain your AVS? MyChart  If the video visit is dropped, the invitation should be resent by: Text to cell phone:  [phone]  Will anyone else be joining your video visit? No               Video-Visit Details    Type of service:  Video Visit    45 minutes spent reviewing scans and path, discussing with consultants and the primary team from [facility] as well as in face to face time with the patient.   Originating Location (pt. Location): Home    Distant Location (provider location):  [facility]     Platform used for Video Visit: [facility] Medical Oncology New Patient Consultation Note       Date of visit: May 28, 2021          Ml: Platinum refractory germ cell tumor    HPI:  Patient is a 25-year-old gentleman previously in good health.  He presented in Sept 2020 with large left testis mass with large retroperitoneal nodes involving the left kidney, consistent with metastasis. Tumor markers were elevated, most notably AFP which was 123,000 at diagnosis.    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 He subsequently underwent chemotherapy at [facility] with total of 5 cycles of Etoposide, Cisplatin, and Ifosfamide. Overall he tolerated chemotherapy very well.     His tumor markers declined but remained elevated. On post-treatment PET scan, he was noted to have had interval improvement in size of retroperitoneal mass, but this remained rather large and clearly invaded the left kidney.     Now with AFP rising and liver mass.   S/P two cycles of TIP.     4/3/21 Last cycle of TIP    4/16/21 AFP = 8,099  4/23/21 AFP = 10,092  5/14/21 AFP =  513  No change in status  Surgery was postponed given the dropping AFP  He feels well.       PMH:  Unremarkable    MEDS  Current Outpatient Medications   Medication Instructions     acetaminophen (TYLENOL) 650 mg, Oral, EVERY 6 HOURS PRN     amLODIPine (NORVASC) 5 mg, Oral, DAILY, (HOLD MEDICATION UNTIL FOLLOWUP with PCP)     HYDROmorphone (DILAUDID) 2 mg, Oral, EVERY 4 HOURS PRN     Lidocaine (LIDOCARE) 4 % Patch 2 patches, Transdermal, EVERY 24 HOURS, To prevent lidocaine toxicity, patient should be patch free for 12 hrs daily.     methocarbamol (ROBAXIN) 500 mg, Oral, 4 TIMES DAILY PRN     ondansetron (ZOFRAN) 4 mg, Oral, EVERY 6 HOURS PRN     polyethylene glycol (MIRALAX) 17 GM/Dose powder 1 capful, Oral, DAILY, (hold for loose stools)     prochlorperazine (COMPAZINE) 10 MG tablet No dose, route, or frequency recorded.     Social history:   He is a native of [place].  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    ROS  Not performed    PHYSICAL EXAM  A+O  NAD  PERRL      LABS AND IMAGES    2/26/2021 - 14  1/14/2021 - 435  12/3/2020 - 1,031  11/18/2020 - 2,471  10/28/2020 - 13,003  10/22/2020 - 21,635  10/7/2020 - 79,636  9/17/2020 - 123,828       4/16/21 AFP = 8,099  4/23/21 AFP = 10,092  5/14/21 AFP =  513        CT scan-I personally reviewed the scans.  Impression:   1. New low-density  enhancing mass in the left lobe of the liver has the same density and enhancement characteristics as the patient's previous testicular and retroperitoneal tumor. This is a new metastasis.  2. Rim-enhancing fluid collection in the right paracolic gutter is probably postoperative.  3. Multiple staples throughout the retroperitoneum consistent with retroperitoneal lymph node dissection.  4. Persistent fecal retention.      1/18/21  PATH from [facility]  A: Peritoneal implant   B: Left kidney, eduard-aortic mass, descending and transverse colon   C: Lymph node, intra aortic caval lymph node   D: Left spermatic cord   E: Jejunum   F: Lymph node, para-caval   G: Lymph node, intra-aortic caval #2     FINAL DIAGNOSIS:   A. Peritoneal implant:   - Fibrous nodule with therapy related changes   - Negative for malignancy     B. Left kidney, eduard-aortic mass, descending and transverse colon:   - Residual yolk sac tumor (0.6 cm and 0.3 cm foci) and small scattered   foci of teratoma elements   - Extensive therapy related changes (>90% of tumor mass)   - 2/25 lymph nodes with residual teratoma elements   - Benign colon and kidney tissue   - See gross description for additional details     C. Lymph node, intra aortic caval lymph node:   - Six benign lymph nodes     D. Left spermatic cord:   - Benign spermatic cord tissue     E. Jejunum:   - Benign segment of intestine    F. Lymph node, para-caval:   - Two benign lymph nodes     G. Lymph node, intra-aortic caval #2:   - Four benign lymph nodes     9/7/20      Final Diagnosis:  A.  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 Tumor invades the scrotum. Margins free of tumor. Pathologic   staging (AJCC 8th ed.): pT4. See Comment for complete tumor synopsis.    B.  Testis, left testicle medial margin with scrotal involvement, orchiectomy - Yolk sac tumor   involving the subcutaneous scrotal soft tissue. Margins free of  tumor.  *  Report Electronically Signed By  *     Sadaf Hoff M.D.     09.17.2020 2:01      Final Diagnosis Comment:  Specimen           Specimen Laterality:   Left  Tumor           Histologic Type:   Mixed germ cell tumor               Embryonal carcinoma (specify percentage): 2%               Yolk sac tumor, postpubertal type (specify percentage): 83%               Teratoma (specify percentage): 15%             Tumor Size:   11.9 x 10.1 x 8.8 Centimeters (cm)           Tumor Focality:   Unifocal    Tumor Extension       Tumor invades through tunica albuginea and perforates tunica vaginalis (mesothelial layer)       Tumor invades scrotum    Margins           Spermatic Cord Margin:   Uninvolved by tumor           Other Margin(s) : Uninvolved by tumor (specify margin(s)): scrotal skin    Lymphovascular Invasion:   Not identified    Lymph Nodes           Regional Lymph Nodes:   No lymph nodes submitted or found    Pathologic Stage Classification (pTNM, AJCC 8th Edition)           Primary Tumor (pT):   pT4           Regional Lymph Nodes (pN):   pNX    IMPRESSION AND PLAN  Patient is a 26-year-old with mixed germ cell tumor with an evolving large liver metastasis and rising AFP.  The differential diagnosis of this phenomenon is rapid disease progression or release of AFP from metastasis in the context of treatment.  I favor the former and in light of the fact that the liver metastasis is resectable I favor immediate consideration of resection of this tumor.  Although it is necrotic on the scan there is likely still some viable germ cell tumor in this.    I am encouraged by the drop in the AFP and surgery has been postponed  I think it best to complete 4 full cycles of the TIP and then consider resection of residual disease.   As a Yolk sac tumor, late relapse would be a worry.     The other option is HDCSCT  But it is not clear if this is better than TIP x 4 ( a randomized trial is underway)    I will discuss with  Dr Corey        SIGNED    Thaddeus Moreno MD  Professor of Medicine

## 2021-05-28 NOTE — LETTER
"    5/28/2021         RE: Cyrus Huertas Jr.  [address] [address]        Dear Colleague,    Thank you for referring your patient, Cyrus Huertas Jr., to the [facility]. Please see a copy of my visit note below.    Cyrus is a 26 year old who is being evaluated via a billable video visit.      How would you like to obtain your AVS? Mail a copy  If the video visit is dropped, the invitation should be resent by: Text to cell phone: [phone]  Will anyone else be joining your video visit? No     Vitals - Patient Reported  Weight (Patient Reported): 61.2 kg (135 lb)  Height (Patient Reported): 170.2 cm (5' 7\")  BMI (Based on Pt Reported Ht/Wt): 21.14  Pain Score: No Pain (0)    Laura Mendoza CMA May 28, 2021  11:30 AM         Video Start Time: 12:10 PM  Video-Visit Details    Type of service:  Video Visit    Video End Time:12:30 PM    Originating Location (pt. Location): Home    Distant Location (provider location):  [facility]     Platform used for Video Visit: Torito Bridges is a 25 year old who is being evaluated via a billable video visit.      How would you like to obtain your AVS? MyChart  If the video visit is dropped, the invitation should be resent by: Text to cell phone:  [phone]  Will anyone else be joining your video visit? No               Video-Visit Details    Type of service:  Video Visit    45 minutes spent reviewing scans and path, discussing with consultants and the primary team from [facility] as well as in face to face time with the patient.   Originating Location (pt. Location): Home    Distant Location (provider location):  [facility]     Platform used for Video Visit: [facility] Medical Oncology New Patient Consultation Note       Date of visit: May 28, 2021          Ml: Platinum refractory germ cell tumor    HPI:  Patient is a " 25-year-old gentleman previously in good health.  He presented in Sept 2020 with large left testis mass with large retroperitoneal nodes involving the left kidney, consistent with metastasis. Tumor markers were elevated, most notably AFP which was 123,000 at diagnosis.    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 He subsequently underwent chemotherapy at [facility] [place] with total of 5 cycles of Etoposide, Cisplatin, and Ifosfamide. Overall he tolerated chemotherapy very well.     His tumor markers declined but remained elevated. On post-treatment PET scan, he was noted to have had interval improvement in size of retroperitoneal mass, but this remained rather large and clearly invaded the left kidney.     Now with AFP rising and liver mass.   S/P two cycles of TIP.     4/3/21 Last cycle of TIP    4/16/21 AFP = 8,099  4/23/21 AFP = 10,092  5/14/21 AFP =  513  No change in status  Surgery was postponed given the dropping AFP  He feels well.       PMH:  Unremarkable    MEDS  Current Outpatient Medications   Medication Instructions     acetaminophen (TYLENOL) 650 mg, Oral, EVERY 6 HOURS PRN     amLODIPine (NORVASC) 5 mg, Oral, DAILY, (HOLD MEDICATION UNTIL FOLLOWUP with PCP)     HYDROmorphone (DILAUDID) 2 mg, Oral, EVERY 4 HOURS PRN     Lidocaine (LIDOCARE) 4 % Patch 2 patches, Transdermal, EVERY 24 HOURS, To prevent lidocaine toxicity, patient should be patch free for 12 hrs daily.     methocarbamol (ROBAXIN) 500 mg, Oral, 4 TIMES DAILY PRN     ondansetron (ZOFRAN) 4 mg, Oral, EVERY 6 HOURS PRN     polyethylene glycol (MIRALAX) 17 GM/Dose powder 1 capful, Oral, DAILY, (hold for loose stools)     prochlorperazine (COMPAZINE) 10 MG tablet No dose, route, or frequency recorded.     Social history:   He is a native of [place].  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    ROS  Not performed    PHYSICAL EXAM  A+O  NAD  PERRL      LABS AND IMAGES    2/26/2021 - 14  1/14/2021  - 435  12/3/2020 - 1,031  11/18/2020 - 2,471  10/28/2020 - 13,003  10/22/2020 - 21,635  10/7/2020 - 79,636  9/17/2020 - 123,828       4/16/21 AFP = 8,099  4/23/21 AFP = 10,092  5/14/21 AFP =  513        CT scan-I personally reviewed the scans.  Impression:   1. New low-density enhancing mass in the left lobe of the liver has the same density and enhancement characteristics as the patient's previous testicular and retroperitoneal tumor. This is a new metastasis.  2. Rim-enhancing fluid collection in the right paracolic gutter is probably postoperative.  3. Multiple staples throughout the retroperitoneum consistent with retroperitoneal lymph node dissection.  4. Persistent fecal retention.      1/18/21  PATH from [facility]  A: Peritoneal implant   B: Left kidney, eduard-aortic mass, descending and transverse colon   C: Lymph node, intra aortic caval lymph node   D: Left spermatic cord   E: Jejunum   F: Lymph node, para-caval   G: Lymph node, intra-aortic caval #2     FINAL DIAGNOSIS:   A. Peritoneal implant:   - Fibrous nodule with therapy related changes   - Negative for malignancy     B. Left kidney, eduard-aortic mass, descending and transverse colon:   - Residual yolk sac tumor (0.6 cm and 0.3 cm foci) and small scattered   foci of teratoma elements   - Extensive therapy related changes (>90% of tumor mass)   - 2/25 lymph nodes with residual teratoma elements   - Benign colon and kidney tissue   - See gross description for additional details     C. Lymph node, intra aortic caval lymph node:   - Six benign lymph nodes     D. Left spermatic cord:   - Benign spermatic cord tissue     E. Jejunum:   - Benign segment of intestine    F. Lymph node, para-caval:   - Two benign lymph nodes     G. Lymph node, intra-aortic caval #2:   - Four benign lymph nodes     9/7/20      Final Diagnosis:  A.  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 Tumor invades the  scrotum. Margins free of tumor. Pathologic   staging (AJCC 8th ed.): pT4. See Comment for complete tumor synopsis.    B.  Testis, left testicle medial margin with scrotal involvement, orchiectomy - Yolk sac tumor   involving the subcutaneous scrotal soft tissue. Margins free of tumor.  *  Report Electronically Signed By  *     Sadaf Hoff M.D.     09.17.2020 2:01      Final Diagnosis Comment:  Specimen           Specimen Laterality:   Left  Tumor           Histologic Type:   Mixed germ cell tumor               Embryonal carcinoma (specify percentage): 2%               Yolk sac tumor, postpubertal type (specify percentage): 83%               Teratoma (specify percentage): 15%             Tumor Size:   11.9 x 10.1 x 8.8 Centimeters (cm)           Tumor Focality:   Unifocal    Tumor Extension       Tumor invades through tunica albuginea and perforates tunica vaginalis (mesothelial layer)       Tumor invades scrotum    Margins           Spermatic Cord Margin:   Uninvolved by tumor           Other Margin(s) : Uninvolved by tumor (specify margin(s)): scrotal skin    Lymphovascular Invasion:   Not identified    Lymph Nodes           Regional Lymph Nodes:   No lymph nodes submitted or found    Pathologic Stage Classification (pTNM, AJCC 8th Edition)           Primary Tumor (pT):   pT4           Regional Lymph Nodes (pN):   pNX    IMPRESSION AND PLAN  Patient is a 26-year-old with mixed germ cell tumor with an evolving large liver metastasis and rising AFP.  The differential diagnosis of this phenomenon is rapid disease progression or release of AFP from metastasis in the context of treatment.  I favor the former and in light of the fact that the liver metastasis is resectable I favor immediate consideration of resection of this tumor.  Although it is necrotic on the scan there is likely still some viable germ cell tumor in this.    I am encouraged by the drop in the AFP and surgery has been postponed  I think it best to  complete 4 full cycles of the TIP and then consider resection of residual disease.   As a Yolk sac tumor, late relapse would be a worry.     The other option is HDCSCT  But it is not clear if this is better than TIP x 4 ( a randomized trial is underway)    I will discuss with Dr Corey        SIGNED    Thaddeus Moreno MD  Professor of Medicine          Again, thank you for allowing me to participate in the care of your patient.        Sincerely,        Thaddeus Moreno MD

## 2021-08-13 ENCOUNTER — VIRTUAL VISIT (OUTPATIENT)
Dept: SURGERY | Facility: CLINIC | Age: 26
End: 2021-08-13
Attending: SURGERY
Payer: COMMERCIAL

## 2021-08-13 DIAGNOSIS — C62.90 MALIGNANT NEOPLASM OF TESTIS METASTATIC TO INTRA-ABDOMINAL LYMPH NODE (H): ICD-10-CM

## 2021-08-13 DIAGNOSIS — C77.2 MALIGNANT NEOPLASM OF TESTIS METASTATIC TO INTRA-ABDOMINAL LYMPH NODE (H): ICD-10-CM

## 2021-08-13 DIAGNOSIS — C80.1 GERM CELL TUMOR (H): Primary | ICD-10-CM

## 2021-08-13 PROCEDURE — 99213 OFFICE O/P EST LOW 20 MIN: CPT | Mod: 95 | Performed by: SURGERY

## 2021-08-13 NOTE — LETTER
8/13/2021         RE: Cyrus Huertas Jr.  [address] [address]        Dear Colleague,    Thank you for referring your patient, Cyrus Huertas Jr., to the [facility]. Please see a copy of my visit note below.    Cyrus is a 26 year old who is being evaluated via a billable video visit.      How would you like to obtain your AVS? Mail a copy  If the video visit is dropped, the invitation should be resent by: Text to cell phone: [phone]  Will anyone else be joining your video visit? No       Marilee LucasDAVID August 13, 2021 2:03 PM      Subjective   Cyrus is a 26 year old who presents for the following health issues     HPI       Review of Systems         Objective           Vitals:  No vitals were obtained today due to virtual visit.    Physical Exam                 Video-Visit Details    Type of service:  Video Visit    Originating Location (pt. 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    HISTORY OF PRESENT ILLNESS:  I met with Cyrus again today.  He has had an exceptionally good response to his therapy with Dr. Weston and Dr. Moreno.  The tumor that was in his liver that previously measured 5 cm is down now to a 1.0 to 1.5 cm.  It lies in segment 2 of the liver.      I met with Cyrus discussed that surgical resection is certainly possible, although I raise the possibility of microwave ablation surgically performed as well, but I will discuss that with Dr. Corey as to whether he or Dr. Moreno have any preference.  I discussed the details of the planned hand-assisted laparoscopic partial hepatectomy to remove this lesion was Cyrus.  I discussed risks including bleeding, bile leak, venous thromboembolism, but overall I think he will recover quite well.  He should return to normal  function.  after surgery.      In the event that we ultimately perform microwave ablation, I did briefly touch on that too, which would be potentially a same-day procedure, although it might not be possible given his extensive previous surgical history.    Cyrus did not have any additional questions for me today.  I will touch base with Dr. Corey, but in the meantime, we will go ahead and get Cyrus scheduled for a planned hand-assisted laparoscopic partial left hepatectomy, possible microwave ablation and possible open surgery in the coming weeks.    A total of 30 minutes was spent on this case, more than half that time was in face-to-face time with Cyrus by virtual video visit.  An additional 15 minutes was spent in review of his interval history coordination of care with Dr. Corey and Dr. Moreno.          Again, thank you for allowing me to participate in the care of your patient.        Sincerely,        Arvin Fischer MD

## 2021-08-13 NOTE — PROGRESS NOTES
Cyrus is a 26 year old who is being evaluated via a billable video visit.      How would you like to obtain your AVS? Mail a copy  If the video visit is dropped, the invitation should be resent by: Text to cell phone: [phone]  Will anyone else be joining your video visit? No       Marilee Lucas LPN August 13, 2021 2:03 PM      Subjective   Cyrus is a 26 year old who presents for the following health issues     HPI       Review of Systems         Objective           Vitals:  No vitals were obtained today due to virtual visit.    Physical Exam                 Video-Visit Details    Type of service:  Video Visit    Originating Location (pt. Location): Home    Distant Location (provider location):  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    HISTORY OF PRESENT ILLNESS:  I met with Cyrus again today.  He has had an exceptionally good response to his therapy with Dr. Weston and Dr. Moerno.  The tumor that was in his liver that previously measured 5 cm is down now to a 1.0 to 1.5 cm.  It lies in segment 2 of the liver.      I met with Cyrus discussed that surgical resection is certainly possible, although I raise the possibility of microwave ablation surgically performed as well, but I will discuss that with Dr. Corey as to whether he or Dr. Moreno have any preference.  I discussed the details of the planned hand-assisted laparoscopic partial hepatectomy to remove this lesion was Cyrus.  I discussed risks including bleeding, bile leak, venous thromboembolism, but overall I think he will recover quite well.  He should return to normal function.  after surgery.      In the event that we ultimately perform microwave ablation, I did briefly touch on that too, which would be potentially a same-day procedure, although it might not be possible given his extensive previous surgical history.    Cyrus did not  have any additional questions for me today.  I will touch base with Dr. Corey, but in the meantime, we will go ahead and get Cyrus scheduled for a planned hand-assisted laparoscopic partial left hepatectomy, possible microwave ablation and possible open surgery in the coming weeks.    A total of 30 minutes was spent on this case, more than half that time was in face-to-face time with Cyrus by virtual video visit.  An additional 15 minutes was spent in review of his interval history coordination of care with Dr. Corey and Dr. Moreno.

## 2021-08-18 ENCOUNTER — TELEPHONE (OUTPATIENT)
Dept: SURGERY | Facility: CLINIC | Age: 26
End: 2021-08-18

## 2021-08-18 NOTE — TELEPHONE ENCOUNTER
RNCC: Jenny Silver    Surgery is scheduled with Dr. Fischer on 9/9 at [place].  Scheduled per orders.    H&P: to be completed by PAC: in person visit on 9/7    [place] [facility]     Post-op: 9/20 as a in person visit.    The RN completed the education regarding the surgery.     Patient will receive surgery arrival and start time from PAC.    The surgery packet was sent via US mail.    Patient will complete COVID-19 test that was scheduled by surgical coordinator 2-4 days prior to surgery.     I left a detailed voicemail regarding the above information and asked for a call back.

## 2021-08-18 NOTE — TELEPHONE ENCOUNTER
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

## 2021-08-19 DIAGNOSIS — Z11.59 ENCOUNTER FOR SCREENING FOR OTHER VIRAL DISEASES: ICD-10-CM

## 2021-08-25 NOTE — TELEPHONE ENCOUNTER
Sent [employer] requesting to confirm appointments/surgery.    Also sent surgery packet via [employer].    Sent packet via US mail and left a VM last week.     Requested that he either call my direct line or reply via [employer] to confirm that all has been received.

## 2021-08-27 ENCOUNTER — VIRTUAL VISIT (OUTPATIENT)
Dept: SURGERY | Facility: CLINIC | Age: 26
End: 2021-08-27
Attending: SURGERY
Payer: COMMERCIAL

## 2021-08-27 DIAGNOSIS — C80.1 GERM CELL TUMOR (H): Primary | ICD-10-CM

## 2021-08-27 PROCEDURE — 99213 OFFICE O/P EST LOW 20 MIN: CPT | Mod: 95 | Performed by: SURGERY

## 2021-08-27 RX ORDER — BISACODYL 5 MG
TABLET, DELAYED RELEASE (ENTERIC COATED) ORAL DAILY PRN
COMMUNITY
Start: 2021-06-01

## 2021-08-27 NOTE — LETTER
"    8/27/2021         RE: Cyrus Huertas Jr.  [address] [facility] [place] [address]        Dear Colleague,    Thank you for referring your patient, Cyrus Huertas Jr., to the [facility]. Please see a copy of my visit note below.    Cyrus is a 26 year old who is being evaluated via a billable telephone visit.      I have reviewed and updated the patient's allergies and medication list.    Concerns: none  Refills: none     Vitals - Patient Reported  Weight (Patient Reported): 59 kg (130 lb)  Height (Patient Reported): 171.5 cm (5' 7.5\")  BMI (Based on Pt Reported Ht/Wt): 20.06  Pain Score: No Pain (0)    Amy Merida CMA      TELEPHONE VISIT    REASON FOR EVALUATION:  Preoperative discussion.    I had a phone call with Cyrus today for about 15 minutes, just going over the surgical plan with him.  I discussed that the small lesion in his left lateral segment may be amenable to laparoscopic ablation.  However, I also discussed that we will perform an intraoperative ultrasound, and if the lesion was a little bit larger than seen on CT scan or somewhat more diffuse, then we will go ahead with plans for a hand-assisted laparoscopic partial left hepatectomy for removal of segments 2 and 3.      I discussed risks of surgery, which include, but are not limited to bleeding, infection, bile leak, additional injury to other structures, but overall this should be fairly straightforward.  I did discuss the possibility of requiring a little bit larger incision because of his previous abdominal history, but hopefully that will not be necessary.  Cyrus really did not have any additional questions for me today.    I will look forward to seeing Cyrus in the operating room for a planned laparoscopic partial left hepatectomy, possible ablation, possible open surgery.    Total of 15 minutes was spent on the phone with Cyrus today.  An additional 15 minutes was spent in coordination of " his care.        Arvin Fischer MD

## 2021-08-27 NOTE — PROGRESS NOTES
"Cyrus is a 26 year old who is being evaluated via a billable telephone visit.      What phone number would you like to be contacted at? [phone]  How would you like to obtain your AVS? Deep     I have reviewed and updated the patient's allergies and medication list.    Concerns: none  Refills: none     Vitals - Patient Reported  Weight (Patient Reported): 59 kg (130 lb)  Height (Patient Reported): 171.5 cm (5' 7.5\")  BMI (Based on Pt Reported Ht/Wt): 20.06  Pain Score: No Pain (0)    Amy Merida CMA      TELEPHONE VISIT    REASON FOR EVALUATION:  Preoperative discussion.    I had a phone call with Cyrus today for about 15 minutes, just going over the surgical plan with him.  I discussed that the small lesion in his left lateral segment may be amenable to laparoscopic ablation.  However, I also discussed that we will perform an intraoperative ultrasound, and if the lesion was a little bit larger than seen on CT scan or somewhat more diffuse, then we will go ahead with plans for a hand-assisted laparoscopic partial left hepatectomy for removal of segments 2 and 3.      I discussed risks of surgery, which include, but are not limited to bleeding, infection, bile leak, additional injury to other structures, but overall this should be fairly straightforward.  I did discuss the possibility of requiring a little bit larger incision because of his previous abdominal history, but hopefully that will not be necessary.  Cyrus really did not have any additional questions for me today.    I will look forward to seeing Cyrus in the operating room for a planned laparoscopic partial left hepatectomy, possible ablation, possible open surgery.    Total of 15 minutes was spent on the phone with Cyrus today.  An additional 15 minutes was spent in coordination of his care.        "

## 2021-09-07 ENCOUNTER — OFFICE VISIT (OUTPATIENT)
Dept: SURGERY | Facility: CLINIC | Age: 26
End: 2021-09-07
Payer: COMMERCIAL

## 2021-09-07 ENCOUNTER — LAB (OUTPATIENT)
Dept: LAB | Facility: CLINIC | Age: 26
End: 2021-09-07

## 2021-09-07 ENCOUNTER — PRE VISIT (OUTPATIENT)
Dept: SURGERY | Facility: CLINIC | Age: 26
End: 2021-09-07

## 2021-09-07 ENCOUNTER — ANESTHESIA EVENT (OUTPATIENT)
Dept: SURGERY | Facility: CLINIC | Age: 26
End: 2021-09-07
Payer: COMMERCIAL

## 2021-09-07 ENCOUNTER — LAB (OUTPATIENT)
Dept: LAB | Facility: CLINIC | Age: 26
End: 2021-09-07
Attending: SURGERY
Payer: COMMERCIAL

## 2021-09-07 VITALS
SYSTOLIC BLOOD PRESSURE: 112 MMHG | RESPIRATION RATE: 16 BRPM | HEIGHT: 67 IN | WEIGHT: 133.8 LBS | HEART RATE: 94 BPM | OXYGEN SATURATION: 98 % | DIASTOLIC BLOOD PRESSURE: 78 MMHG | BODY MASS INDEX: 21 KG/M2 | TEMPERATURE: 98.2 F

## 2021-09-07 DIAGNOSIS — Z01.818 PRE-OP EVALUATION: ICD-10-CM

## 2021-09-07 DIAGNOSIS — Z11.59 ENCOUNTER FOR SCREENING FOR OTHER VIRAL DISEASES: ICD-10-CM

## 2021-09-07 DIAGNOSIS — Z01.818 PRE-OP EVALUATION: Primary | ICD-10-CM

## 2021-09-07 LAB
ABO/RH(D): NORMAL
ANTIBODY SCREEN: NEGATIVE
ERYTHROCYTE [DISTWIDTH] IN BLOOD BY AUTOMATED COUNT: 14.2 % (ref 10–15)
HCT VFR BLD AUTO: 28.4 % (ref 40–53)
HGB BLD-MCNC: 9.5 G/DL (ref 13.3–17.7)
MCH RBC QN AUTO: 33.5 PG (ref 26.5–33)
MCHC RBC AUTO-ENTMCNC: 33.5 G/DL (ref 31.5–36.5)
MCV RBC AUTO: 100 FL (ref 78–100)
PLATELET # BLD AUTO: 152 10E3/UL (ref 150–450)
RBC # BLD AUTO: 2.84 10E6/UL (ref 4.4–5.9)
SARS-COV-2 RNA RESP QL NAA+PROBE: NEGATIVE
SPECIMEN EXPIRATION DATE: NORMAL
WBC # BLD AUTO: 3.6 10E3/UL (ref 4–11)

## 2021-09-07 PROCEDURE — 86901 BLOOD TYPING SEROLOGIC RH(D): CPT | Mod: 90 | Performed by: PATHOLOGY

## 2021-09-07 PROCEDURE — 85027 COMPLETE CBC AUTOMATED: CPT | Performed by: PATHOLOGY

## 2021-09-07 PROCEDURE — 86900 BLOOD TYPING SEROLOGIC ABO: CPT | Mod: 90 | Performed by: PATHOLOGY

## 2021-09-07 PROCEDURE — 99202 OFFICE O/P NEW SF 15 MIN: CPT | Performed by: PHYSICIAN ASSISTANT

## 2021-09-07 PROCEDURE — U0003 INFECTIOUS AGENT DETECTION BY NUCLEIC ACID (DNA OR RNA); SEVERE ACUTE RESPIRATORY SYNDROME CORONAVIRUS 2 (SARS-COV-2) (CORONAVIRUS DISEASE [COVID-19]), AMPLIFIED PROBE TECHNIQUE, MAKING USE OF HIGH THROUGHPUT TECHNOLOGIES AS DESCRIBED BY CMS-2020-01-R: HCPCS | Mod: 90 | Performed by: PATHOLOGY

## 2021-09-07 PROCEDURE — 86850 RBC ANTIBODY SCREEN: CPT | Mod: 90 | Performed by: PATHOLOGY

## 2021-09-07 PROCEDURE — 36415 COLL VENOUS BLD VENIPUNCTURE: CPT | Performed by: PATHOLOGY

## 2021-09-07 PROCEDURE — U0005 INFEC AGEN DETEC AMPLI PROBE: HCPCS | Mod: 90 | Performed by: PATHOLOGY

## 2021-09-07 ASSESSMENT — PAIN SCALES - GENERAL: PAINLEVEL: NO PAIN (0)

## 2021-09-07 ASSESSMENT — MIFFLIN-ST. JEOR: SCORE: 1535.54

## 2021-09-07 ASSESSMENT — LIFESTYLE VARIABLES: TOBACCO_USE: 0

## 2021-09-07 NOTE — ANESTHESIA PREPROCEDURE EVALUATION
Anesthesia Pre-Procedure Evaluation    Patient: Cyrus Huertas Jr.   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 Jejunectomy;  Surgeon: Ruma Angela MD;  Location: [facility] OR     LYMPHADENECTOMY RETROPERITONEAL Left 2021    Procedure: LYMPHADENECTOMY, RETROPERITONEUM; RESECTION OF RETROPERITONEAL MASS;  Surgeon: Frank Major MD;  Location: [facility] OR     NEPHRECTOMY Left 2021    Procedure: LEFT RADICAL NEPHRECTOMY;  Surgeon: Frank Major MD;  Location: [facility] OR     SIGMOIDOSCOPY FLEXIBLE N/A 2021    Procedure: Sigmoidoscopy flexible;  Surgeon: Ruma Angela MD;  Location: [facility] OR      Allergies   Allergen Reactions     Penicillins Anaphylaxis and Rash     rash     Lactose Unknown     Uncaria Tomentosa (Cats Claw) Rash      Social History     Tobacco Use     Smoking status: Never Smoker     Smokeless tobacco: Never Used   Substance Use Topics     Alcohol use: Not Currently      Wt Readings from Last 1 Encounters:   21 59.7 kg (131 lb 11.2 oz)        Anesthesia Evaluation   Pt has had prior anesthetic. Type: General.    No history of anesthetic complications       ROS/MED HX  ENT/Pulmonary:  - neg pulmonary ROS  (-) tobacco use   Neurologic:  - neg neurologic ROS     Cardiovascular:     (+) -----Previous cardiac testing    Echo: Date: Results:    Stress Test: Date: Results:    ECG Reviewed: Date: 4/30/21 Results:  Sinus tachycardia, non specific t wave abnormality   Cath: Date: Results:   (-) murmur   METS/Exercise Tolerance: >4 METS Comment: Skate, aerobics classes   Hematologic: Comments: patient reports no reactions to RBCs, but reports he did have a reaction to the platelets- hives    (+) anemia, history of blood transfusion, no previous transfusion reaction,     Musculoskeletal:  - neg musculoskeletal ROS     GI/Hepatic: Comment: S/p partial colectomy. Liver mets.      Renal/Genitourinary: Comment: Hx nephrectomy      Endo:  - neg endo ROS     Psychiatric/Substance Use:  - neg psychiatric ROS     Infectious Disease:  - neg infectious disease ROS     Malignancy:   (+) Malignancy, History of Other.Other CA germ cell tumor Active status post Surgery, Chemo and Radiation.    Other:            Physical Exam    Airway        Mallampati: I   TM distance: > 3 FB   Neck ROM: full   Mouth opening: > 3 cm    Respiratory Devices and Support         Dental  no notable dental history         Cardiovascular   cardiovascular exam normal       Rhythm and rate: regular and normal (-) no peripheral edema and no murmur    Pulmonary   pulmonary exam normal        breath sounds clear to auscultation           OUTSIDE LABS:  CBC:   Lab Results   Component Value Date    WBC 3.9 (L) 02/26/2021    WBC 10.4 01/28/2021    HGB 9.3 (L) 02/26/2021    HGB 8.7 (L) 01/28/2021    HCT 31.1 (L) 02/26/2021    HCT 26.8 (L) 01/28/2021     02/26/2021     01/28/2021     BMP:   Lab Results   Component Value Date     02/26/2021     (L) 01/28/2021    POTASSIUM 4.2 02/26/2021    POTASSIUM 4.3 01/28/2021    CHLORIDE 106 02/26/2021    CHLORIDE 100 01/28/2021    CO2 29 02/26/2021    CO2 27 01/28/2021    BUN 15 02/26/2021    BUN 14 01/28/2021    CR 1.36 (H) 02/26/2021    CR 1.34 (H) 01/28/2021    GLC 76 02/26/2021    GLC 90 01/28/2021     COAGS: No  results found for: PTT, INR, FIBR  POC:   Lab Results   Component Value Date     (H) 01/19/2021     HEPATIC:   Lab Results   Component Value Date    ALBUMIN 3.0 (L) 02/26/2021    PROTTOTAL 8.5 02/26/2021    ALT 24 02/26/2021    AST 17 02/26/2021    ALKPHOS 105 02/26/2021    BILITOTAL 0.1 (L) 02/26/2021     OTHER:   Lab Results   Component Value Date    PH 7.37 01/18/2021    LACT 2.2 (H) 01/19/2021    GAYE 9.6 02/26/2021    PHOS 2.8 01/22/2021    MAG 1.9 01/25/2021       Anesthesia Plan    ASA Status:  2   NPO Status:  NPO Appropriate    Anesthesia Type: General.     - Airway: ETT   Induction: Intravenous.   Maintenance: Balanced.   Techniques and Equipment:     - Lines/Monitors: 2nd IV, Arterial Line, BIS     - Blood: T&S     Consents    Anesthesia Plan(s) and associated risks, benefits, and realistic alternatives discussed. Questions answered and patient/representative(s) expressed understanding.     - Discussed with:  Patient         Postoperative Care    Pain management: IV analgesics, Oral pain medications, Multi-modal analgesia, Peripheral nerve block (Single Shot).   PONV prophylaxis: Ondansetron (or other 5HT-3), Dexamethasone or Solumedrol     Comments:              PAC Discussion and Assessment    ASA Classification: 3  Case is suitable for: [place]  Anesthetic techniques and relevant risks discussed: GA                  PAC Resident/NP Anesthesia Assessment: Cyrus Huertas is a 26 year old male scheduled for Laparoscopic hand assisted partial hepatectomy, laparoscopic microwave ablation of liver tumor on 9/9/21 by Dr. Fischer in treatment of germ cell tumor.  PAC referral for risk assessment and optimization for anesthesia with comorbid conditions of hypertension, anemia, CRI:      Pre-operative considerations:   1.  Cardiac:  Functional status- METS >4. Denies cardiac symptoms.  high risk surgery with 0.9% (RCRI #) risk of major adverse cardiac event.      2.  Pulm:  Airway feasible.  JANIA risk:  low  ~non smoker      3.  GI:  Risk of PONV score = 2.  If > 2, anti-emetic intervention recommended.      4.  heme: anemia s/p multiple previous transfusions. hgb 8.3 8/3/21. Will update CBC and T&S today.       5. onc: germ cell tumor s/p left orchiectomy 9/2020, surgical resection of retroperitoneal mass and nephrectomy 1/2021 and chemotherapy.  Now with the above procedure planned       VTE risk: 3%     Patient is optimized and is acceptable candidate for the proposed procedure.  No further diagnostic evaluation is needed.      Final plan per anesthesiologist on day of surgery.      **For further details of assessment, testing, and physical exam please see H and P completed on same date.         OLIVA Cuellar PA-C

## 2021-09-07 NOTE — H&P
Pre-Operative H & P     CC:  Preoperative exam to assess for increased cardiopulmonary risk while undergoing surgery and anesthesia.    Date of Encounter: 9/7/2021  Primary Care Physician:  Vj Corey     Reason for visit:   Encounter Diagnosis   Name Primary?     Pre-op evaluation Yes       HPI  Cyrus Huertas Jr. is a 26 year old male who presents for pre-operative H & P in preparation for Laparoscopic hand assisted partial hepatectomy, laparoscopic microwave ablation of liver tumor with Dr. Fischer on 9/9/21 at [facility]. 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 He is s/p left orchiectomy 9/2020, surgical resection of retroperitoneal mass and nephrectomy 1/2021 and chemotherapy. he was also found to have a liver tumor that has decreased in size from 5cm to 1-1.5 cm after chemotherapy. He was seen by Dr. Fischer for his liver mass. He is now scheduled for the above procedure.      History is obtained from the patient and chart review      Hx of abnormal bleeding or anti-platelet use: denies    Menstrual history: No LMP for male patient.    Prior to Admission Medications  Current Outpatient Medications   Medication Sig Dispense Refill     bisacodyl (DULCOLAX) 5 MG EC tablet          Family History  Family History   Problem Relation Age of Onset     Anesthesia Reaction No family hx of      Cardiovascular No family hx of      Deep Vein Thrombosis (DVT) No family hx of        The complete review of systems is negative other than noted in the HPI or here.     Anesthesia Evaluation   Pt has had prior anesthetic. Type: General.    No history of anesthetic complications       ROS/MED HX  ENT/Pulmonary:  - neg pulmonary ROS  (-) tobacco use   Neurologic:  - neg neurologic ROS     Cardiovascular:     (+) -----Previous cardiac testing   Echo: Date: Results:    Stress  Test: Date: Results:    ECG Reviewed: Date: 4/30/21 Results:  Sinus tachycardia, non specific t wave abnormality   Cath: Date: Results:   (-) murmur   METS/Exercise Tolerance: >4 METS Comment: Skate, aerobics classes   Hematologic: Comments: patient reports no reactions to RBCs, but reports he did have a reaction to the platelets- hives    (+) anemia, history of blood transfusion, no previous transfusion reaction,     Musculoskeletal:  - neg musculoskeletal ROS     GI/Hepatic:  - neg GI/hepatic ROS     Renal/Genitourinary:     (+) renal disease, type: CRI, Pt does not require dialysis,     Endo:  - neg endo ROS     Psychiatric/Substance Use:  - neg psychiatric ROS     Infectious Disease:  - neg infectious disease ROS     Malignancy:   (+) Malignancy, History of Other.Other CA germ cell tumor Active status post Surgery, Chemo and Radiation.    Other:                 OUTSIDE LABS:  CBC:   Lab Results   Component Value Date    WBC 3.9 (L) 02/26/2021    WBC 10.4 01/28/2021    HGB 9.3 (L) 02/26/2021    HGB 8.7 (L) 01/28/2021    HCT 31.1 (L) 02/26/2021    HCT 26.8 (L) 01/28/2021     02/26/2021     01/28/2021     BMP:   Lab Results   Component Value Date     02/26/2021     (L) 01/28/2021    POTASSIUM 4.2 02/26/2021    POTASSIUM 4.3 01/28/2021    CHLORIDE 106 02/26/2021    CHLORIDE 100 01/28/2021    CO2 29 02/26/2021    CO2 27 01/28/2021    BUN 15 02/26/2021    BUN 14 01/28/2021    CR 1.36 (H) 02/26/2021    CR 1.34 (H) 01/28/2021    GLC 76 02/26/2021    GLC 90 01/28/2021     COAGS: No results found for: PTT, INR, FIBR  POC:   Lab Results   Component Value Date     (H) 01/19/2021     HEPATIC:   Lab Results   Component Value Date    ALBUMIN 3.0 (L) 02/26/2021    PROTTOTAL 8.5 02/26/2021    ALT 24 02/26/2021    AST 17 02/26/2021    ALKPHOS 105 02/26/2021    BILITOTAL 0.1 (L) 02/26/2021     OTHER:   Lab Results   Component Value Date    PH 7.37 01/18/2021    LACT 2.2 (H) 01/19/2021    GAYE 9.6  02/26/2021    PHOS 2.8 01/22/2021    MAG 1.9 01/25/2021           There were no vitals taken for this visit.         Physical Exam  Constitutional: Awake, alert, cooperative, no apparent distress, and appears stated age.  Eyes: Pupils equal, round and reactive to light, extra ocular muscles intact, sclera clear, conjunctiva normal.  HENT: Normocephalic, oral pharynx with moist mucus membranes, good dentition.   Respiratory: Clear to auscultation bilaterally, no crackles or wheezing.  Cardiovascular: Regular rate and rhythm, normal S1 and S2, and no murmur noted.  Carotids no bruits. No edema. Palpable pulses to radial arteries.   GI: Normal bowel sounds, soft, non-distended, non-tender  Genitourinary:  defered  Skin: Warm and dry.  No rashes at anticipated surgical site.   Musculoskeletal: Full ROM of neck. There is no redness, warmth, or swelling of the exposed joints. Gross motor strength is normal.    Neurologic: Awake, alert, oriented to name, place and time. Cranial nerves II-XII are grossly intact. Gait is normal.   Neuropsychiatric: Calm, cooperative. Normal affect.     PRIOR LABS/DIAGNOSTIC STUDIES:   All labs and imaging personally reviewed     EKG/ stress test - if available please see in ROS above   8/3/21  WBC 4.00 - 10.00 k/cmm 4.17        RBC 4.60 - 6.00 m/cmm 2.50Low         Hgb 13.1 - 17.5 g/dL 8.3Low         Hematocrit 40.0 - 51.0 % 25.5Low         MCV 80.0 - 100.0 fL 102.0High         MCH 25.0 - 32.0 pg 33.2High         MCHC 31.0 - 36.0 g/dL 32.5        RDW 11.5 - 14.5 % 17.3High         Plt 150 - 400 k/cmm 217        MPV 6.5 - 12.5 fL 10.1        NRBC 0.0 - 0.0 % 0.0        Automated Abs Neutrophil 1.70 - 6.50 k/cmm 2.27    Comment: Preliminary ANC, final result to follow.       Abs Immature Granulocyte 0.00 - 0.09 k/cmm 0.02    Comment: The Immature Granulocyte Absolute count contains metamyelocytes and myelocytes.       Abs Neutrophil 1.70 - 6.50 k/cmm 2.27        Abs Lymphocyte 0.80 - 4.00  k/cmm 1.13        Abs Monocyte 0.20 - 1.00 k/cmm 0.71        Abs Eosinophil 0.00 - 0.60 k/cmm 0.01        Abs Basophil 0.00 - 0.20 k/cmm 0.03      Sodium 135 - 148 mEq/L 141        Potassium 3.5 - 5.3 mEq/L 4.3        Chloride 92 - 108 mEq/L 107        CO2 22 - 30 mEq/L 23        Glucose 70 - 100 mg/dL 92        BUN 6 - 20 mg/dL 8        Creatinine 0.70 - 1.25 mg/dL 1.65High         Calcium 8.6 - 10.0 mg/dL 9.3        Total Protein 6.4 - 8.3 g/dL 7.7        Albumin 3.8 - 5.1 g/dL 4.2        Bili Total 0.1 - 1.2 mg/dL <0.2        Alk Phos 40 - 129 IU/L 136High         ALT (SGPT) <=41 IU/L 13        AST(SGOT) 5 - 40 IU/L 21        AnGap 8 - 16 mEq/L 11        eGFR, High >=60 ml/min/1.73m2 64    Comment: Calculated using CKD-EPI equation       eGFR, Low >=60 ml/min/1.73m2 56Low         The patient's records and results personally reviewed by this provider.     Outside records reviewed from: care everywhere    LAB/DIAGNOSTIC STUDIES TODAY:  Will update CBC and T&S    ASSESSMENT and PLAN  Cyrus Huertas is a 26 year old male scheduled for Laparoscopic hand assisted partial hepatectomy, laparoscopic microwave ablation of liver tumor on 9/9/21 by Dr. Fischer in treatment of germ cell tumor.  PAC referral for risk assessment and optimization for anesthesia with comorbid conditions of hypertension, anemia, CRI:      Pre-operative considerations:   1.  Cardiac:  Functional status- METS >4. Denies cardiac symptoms.  high risk surgery with 0.9% (RCRI #) risk of major adverse cardiac event.      2.  Pulm:  Airway feasible.  JANIA risk: low  ~non smoker      3.  GI:  Risk of PONV score = 2.  If > 2, anti-emetic intervention recommended.      4.  heme: anemia s/p multiple previous transfusions. hgb 8.3 8/3/21. Will update CBC and T&S today.       5. onc: germ cell tumor s/p left orchiectomy 9/2020, surgical resection of retroperitoneal mass and nephrectomy 1/2021 and chemotherapy.  Now with the above procedure planned       VTE risk:  3%     Patient is optimized and is acceptable candidate for the proposed procedure.  No further diagnostic evaluation is needed.      Final plan per anesthesiologist on day of surgery.      Arrival time, NPO, shower and medication instructions provided by nursing staff today.    25 minutes were spent completing chart review, seeing the patient, reviewing labs and test results, and completing documentation       Yulisa Foy PA-C  Preoperative Assessment Center  [facility]  Clinic and Surgery Center  Phone: [phone]  Fax: [phone]

## 2021-09-07 NOTE — PATIENT INSTRUCTIONS
/Preparing for Your Surgery      Name:  Cyrus Huertas Jr.   MRN:  [id number]   :  1995   Today's Date:  2021       Arriving for surgery:  Surgery date:  21  Arrival time:  6:30AM     Restrictions due to COVID 19:       One visitor is allowed in the Pre Op area. When you go into surgery, one visitor is allowed to wait in the Surgery Waiting Room       (provided there is enough space to social distance).   After surgery- Two visitors are allowed at a time if you have a private room and one visitor is allowed for those in a semi-private room.   Every 4 days the visitor(s) can rotate. During the 4 day period, the visitor(s) must be consistent. No visitors under the age of 18 years old.   Visiting Hours: 8 am - 8:30 pm   No ill visitors.   All visitors must wear face mask.    [facility] parking is available for anyone with mobility limitations or disabilities.  ([facility]  24 hours/ 7 days a week; [facility]  7 am- 3:30 pm, Mon- Fri)    Please come to:     [facility] [address]  [address] [place] [address]    When entering the hospital you will be asked COVID screening questions, you will then be directed to Registration.  Registration will direct you to the 3rd floor Surgery waiting room. Please ask if you need an escort or a wheelchair to the Surgery Waiting Room.  Preop number- [phone]     What can I eat or drink?  -  You may eat and drink normally for up to 8 hours before your surgery. (Until Midnight)  -  You may have clear liquids until 2 hours before surgery. (Until 21, 6:30AM)    Examples of clear liquids:  Water  Clear broth  Juices (apple, white grape, white cranberry  and cider) without pulp  Noncarbonated, powder based beverages  (lemonade and Pro-Aid)  Sodas (Sprite, 7-Up, ginger ale and seltzer)  Coffee or tea (without milk or cream)  Gatorade    -  No Alcohol for at least 24 hours before surgery     Which  medicines can I take?    Hold Aspirin for 7 days before surgery.   Hold Multivitamins for 7 days before surgery.  Hold Supplements for 7 days before surgery.  Hold Ibuprofen (Advil, Motrin) for 1 day before surgery--unless otherwise directed by surgeon.  Hold Naproxen (Aleve) for 4 days before surgery.    -  DO NOT take these medications the day of surgery:    Dulcolax      How do I prepare myself?  - Please take 2 showers before surgery using Scrubcare or Hibiclens soap.    Use this soap only from the neck to your toes.     Leave the soap on your skin for one minute--then rinse thoroughly.      You may use your own shampoo and conditioner; no other hair products.   - Please remove all jewelry and body piercings.  - No lotions, deodorants or fragrance.  - No makeup or fingernail polish.   - Bring your ID and insurance card.    -If you have a Deep Brain Stimulator, Spinal Cord Stimulator or any neuro stimulator device---you must bring the remote control to the hospital     - All patients are required to have a Covid-19 test within 4 days of surgery/procedure.      -Patients will be contacted by the [facility] scheduling team within 1 week of surgery to make an appointment.      - Patients may call the Scheduling team at [phone] if they have not been scheduled within 4 days of  surgery.          Questions or Concerns:    - For any questions regarding the day of surgery or your hospital stay, please contact the Pre Admission Nursing Office at [phone].       - If you have health changes between today and your surgery please call your surgeon.       For questions after surgery please call your surgeons office.

## 2021-09-09 ENCOUNTER — ANCILLARY PROCEDURE (OUTPATIENT)
Dept: ULTRASOUND IMAGING | Facility: CLINIC | Age: 26
End: 2021-09-09
Payer: COMMERCIAL

## 2021-09-09 ENCOUNTER — HOSPITAL ENCOUNTER (INPATIENT)
Facility: CLINIC | Age: 26
LOS: 4 days | Discharge: HOME | End: 2021-09-13
Attending: SURGERY | Admitting: SURGERY
Payer: COMMERCIAL

## 2021-09-09 ENCOUNTER — ANESTHESIA (OUTPATIENT)
Dept: SURGERY | Facility: CLINIC | Age: 26
End: 2021-09-09
Payer: COMMERCIAL

## 2021-09-09 DIAGNOSIS — C62.90 MALIGNANT NEOPLASM OF TESTIS METASTATIC TO INTRA-ABDOMINAL LYMPH NODE (H): ICD-10-CM

## 2021-09-09 DIAGNOSIS — G89.18 POSTOPERATIVE PAIN: ICD-10-CM

## 2021-09-09 DIAGNOSIS — C77.2 MALIGNANT NEOPLASM OF TESTIS METASTATIC TO INTRA-ABDOMINAL LYMPH NODE (H): ICD-10-CM

## 2021-09-09 DIAGNOSIS — C80.1 GERM CELL TUMOR (H): Primary | ICD-10-CM

## 2021-09-09 LAB
ALBUMIN SERPL-MCNC: 3.1 G/DL (ref 3.4–5)
ALP SERPL-CCNC: 105 U/L (ref 40–150)
ALT SERPL W P-5'-P-CCNC: 113 U/L (ref 0–70)
ANION GAP SERPL CALCULATED.3IONS-SCNC: 4 MMOL/L (ref 3–14)
AST SERPL W P-5'-P-CCNC: 203 U/L (ref 0–45)
BILIRUB SERPL-MCNC: 0.6 MG/DL (ref 0.2–1.3)
BUN SERPL-MCNC: 16 MG/DL (ref 7–30)
CALCIUM SERPL-MCNC: 8.7 MG/DL (ref 8.5–10.1)
CHLORIDE BLD-SCNC: 111 MMOL/L (ref 94–109)
CO2 SERPL-SCNC: 23 MMOL/L (ref 20–32)
CREAT SERPL-MCNC: 1.68 MG/DL (ref 0.66–1.25)
ERYTHROCYTE [DISTWIDTH] IN BLOOD BY AUTOMATED COUNT: 14.4 % (ref 10–15)
GFR SERPL CREATININE-BSD FRML MDRD: 54 ML/MIN/1.73M2
GLUCOSE BLD-MCNC: 104 MG/DL (ref 70–99)
GLUCOSE BLDC GLUCOMTR-MCNC: 87 MG/DL (ref 70–99)
HCT VFR BLD AUTO: 29.8 % (ref 40–53)
HGB BLD-MCNC: 9.4 G/DL (ref 13.3–17.7)
INR PPP: 1.15 (ref 0.85–1.15)
MCH RBC QN AUTO: 33.3 PG (ref 26.5–33)
MCHC RBC AUTO-ENTMCNC: 31.5 G/DL (ref 31.5–36.5)
MCV RBC AUTO: 106 FL (ref 78–100)
PLATELET # BLD AUTO: 157 10E3/UL (ref 150–450)
POTASSIUM BLD-SCNC: 4.6 MMOL/L (ref 3.4–5.3)
PROT SERPL-MCNC: 6.5 G/DL (ref 6.8–8.8)
RBC # BLD AUTO: 2.82 10E6/UL (ref 4.4–5.9)
SODIUM SERPL-SCNC: 138 MMOL/L (ref 133–144)
WBC # BLD AUTO: 9.8 10E3/UL (ref 4–11)

## 2021-09-09 PROCEDURE — 85027 COMPLETE CBC AUTOMATED: CPT | Performed by: STUDENT IN AN ORGANIZED HEALTH CARE EDUCATION/TRAINING PROGRAM

## 2021-09-09 PROCEDURE — 250N000011 HC RX IP 250 OP 636: Performed by: STUDENT IN AN ORGANIZED HEALTH CARE EDUCATION/TRAINING PROGRAM

## 2021-09-09 PROCEDURE — 250N000011 HC RX IP 250 OP 636: Performed by: ANESTHESIOLOGY

## 2021-09-09 PROCEDURE — 250N000025 HC SEVOFLURANE, PER MIN: Performed by: SURGERY

## 2021-09-09 PROCEDURE — 88313 SPECIAL STAINS GROUP 2: CPT | Mod: 26 | Performed by: PATHOLOGY

## 2021-09-09 PROCEDURE — 250N000009 HC RX 250: Performed by: ANESTHESIOLOGY

## 2021-09-09 PROCEDURE — 999N000015 HC STATISTIC ARTERIAL MONITORING DAILY

## 2021-09-09 PROCEDURE — 258N000003 HC RX IP 258 OP 636: Performed by: ANESTHESIOLOGY

## 2021-09-09 PROCEDURE — C9290 INJ, BUPIVACAINE LIPOSOME: HCPCS | Performed by: ANESTHESIOLOGY

## 2021-09-09 PROCEDURE — 250N000011 HC RX IP 250 OP 636: Performed by: SURGERY

## 2021-09-09 PROCEDURE — 999N000157 HC STATISTIC RCP TIME EA 10 MIN

## 2021-09-09 PROCEDURE — 999N000141 HC STATISTIC PRE-PROCEDURE NURSING ASSESSMENT: Performed by: SURGERY

## 2021-09-09 PROCEDURE — 250N000013 HC RX MED GY IP 250 OP 250 PS 637: Performed by: STUDENT IN AN ORGANIZED HEALTH CARE EDUCATION/TRAINING PROGRAM

## 2021-09-09 PROCEDURE — 85610 PROTHROMBIN TIME: CPT | Performed by: STUDENT IN AN ORGANIZED HEALTH CARE EDUCATION/TRAINING PROGRAM

## 2021-09-09 PROCEDURE — 88313 SPECIAL STAINS GROUP 2: CPT | Mod: TC | Performed by: SURGERY

## 2021-09-09 PROCEDURE — 88329 PATH CONSLTJ DRG SURG: CPT | Performed by: PATHOLOGY

## 2021-09-09 PROCEDURE — 47379 UNLISTED LAPS PX LIVER: CPT | Mod: 22 | Performed by: SURGERY

## 2021-09-09 PROCEDURE — 36415 COLL VENOUS BLD VENIPUNCTURE: CPT | Performed by: STUDENT IN AN ORGANIZED HEALTH CARE EDUCATION/TRAINING PROGRAM

## 2021-09-09 PROCEDURE — 88307 TISSUE EXAM BY PATHOLOGIST: CPT | Mod: 26 | Performed by: PATHOLOGY

## 2021-09-09 PROCEDURE — 258N000003 HC RX IP 258 OP 636: Performed by: STUDENT IN AN ORGANIZED HEALTH CARE EDUCATION/TRAINING PROGRAM

## 2021-09-09 PROCEDURE — 360N000078 HC SURGERY LEVEL 5, PER MIN: Performed by: SURGERY

## 2021-09-09 PROCEDURE — 120N000002 HC R&B MED SURG/OB UMMC

## 2021-09-09 PROCEDURE — 370N000017 HC ANESTHESIA TECHNICAL FEE, PER MIN: Performed by: SURGERY

## 2021-09-09 PROCEDURE — 710N000010 HC RECOVERY PHASE 1, LEVEL 2, PER MIN: Performed by: SURGERY

## 2021-09-09 PROCEDURE — 82040 ASSAY OF SERUM ALBUMIN: CPT | Performed by: STUDENT IN AN ORGANIZED HEALTH CARE EDUCATION/TRAINING PROGRAM

## 2021-09-09 PROCEDURE — 250N000013 HC RX MED GY IP 250 OP 250 PS 637: Performed by: ANESTHESIOLOGY

## 2021-09-09 PROCEDURE — 272N000001 HC OR GENERAL SUPPLY STERILE: Performed by: SURGERY

## 2021-09-09 RX ORDER — ALBUTEROL SULFATE 0.83 MG/ML
2.5 SOLUTION RESPIRATORY (INHALATION) EVERY 4 HOURS PRN
Status: DISCONTINUED | OUTPATIENT
Start: 2021-09-09 | End: 2021-09-09 | Stop reason: HOSPADM

## 2021-09-09 RX ORDER — NALOXONE HYDROCHLORIDE 0.4 MG/ML
0.2 INJECTION, SOLUTION INTRAMUSCULAR; INTRAVENOUS; SUBCUTANEOUS
Status: DISCONTINUED | OUTPATIENT
Start: 2021-09-09 | End: 2021-09-13 | Stop reason: HOSPADM

## 2021-09-09 RX ORDER — BUPIVACAINE HYDROCHLORIDE 2.5 MG/ML
INJECTION, SOLUTION EPIDURAL; INFILTRATION; INTRACAUDAL
Status: COMPLETED | OUTPATIENT
Start: 2021-09-09 | End: 2021-09-09

## 2021-09-09 RX ORDER — SODIUM CHLORIDE, SODIUM LACTATE, POTASSIUM CHLORIDE, CALCIUM CHLORIDE 600; 310; 30; 20 MG/100ML; MG/100ML; MG/100ML; MG/100ML
INJECTION, SOLUTION INTRAVENOUS CONTINUOUS
Status: DISCONTINUED | OUTPATIENT
Start: 2021-09-09 | End: 2021-09-10

## 2021-09-09 RX ORDER — ONDANSETRON 4 MG/1
4 TABLET, ORALLY DISINTEGRATING ORAL EVERY 6 HOURS PRN
Status: DISCONTINUED | OUTPATIENT
Start: 2021-09-09 | End: 2021-09-13 | Stop reason: HOSPADM

## 2021-09-09 RX ORDER — SODIUM CHLORIDE, SODIUM LACTATE, POTASSIUM CHLORIDE, CALCIUM CHLORIDE 600; 310; 30; 20 MG/100ML; MG/100ML; MG/100ML; MG/100ML
INJECTION, SOLUTION INTRAVENOUS CONTINUOUS PRN
Status: DISCONTINUED | OUTPATIENT
Start: 2021-09-09 | End: 2021-09-09

## 2021-09-09 RX ORDER — METHOCARBAMOL 750 MG/1
750 TABLET, FILM COATED ORAL EVERY 6 HOURS PRN
Status: DISCONTINUED | OUTPATIENT
Start: 2021-09-09 | End: 2021-09-13 | Stop reason: HOSPADM

## 2021-09-09 RX ORDER — DEXAMETHASONE SODIUM PHOSPHATE 4 MG/ML
INJECTION, SOLUTION INTRA-ARTICULAR; INTRALESIONAL; INTRAMUSCULAR; INTRAVENOUS; SOFT TISSUE PRN
Status: DISCONTINUED | OUTPATIENT
Start: 2021-09-09 | End: 2021-09-09

## 2021-09-09 RX ORDER — LIDOCAINE 40 MG/G
CREAM TOPICAL
Status: DISCONTINUED | OUTPATIENT
Start: 2021-09-09 | End: 2021-09-13 | Stop reason: HOSPADM

## 2021-09-09 RX ORDER — OXYCODONE HYDROCHLORIDE 5 MG/1
5 TABLET ORAL EVERY 4 HOURS PRN
Status: DISCONTINUED | OUTPATIENT
Start: 2021-09-09 | End: 2021-09-11

## 2021-09-09 RX ORDER — LIDOCAINE HYDROCHLORIDE 20 MG/ML
INJECTION, SOLUTION INFILTRATION; PERINEURAL PRN
Status: DISCONTINUED | OUTPATIENT
Start: 2021-09-09 | End: 2021-09-09

## 2021-09-09 RX ORDER — LABETALOL HYDROCHLORIDE 5 MG/ML
5-10 INJECTION, SOLUTION INTRAVENOUS EVERY 10 MIN PRN
Status: DISCONTINUED | OUTPATIENT
Start: 2021-09-09 | End: 2021-09-09 | Stop reason: HOSPADM

## 2021-09-09 RX ORDER — NALOXONE HYDROCHLORIDE 0.4 MG/ML
0.2 INJECTION, SOLUTION INTRAMUSCULAR; INTRAVENOUS; SUBCUTANEOUS
Status: DISCONTINUED | OUTPATIENT
Start: 2021-09-09 | End: 2021-09-09 | Stop reason: HOSPADM

## 2021-09-09 RX ORDER — POLYETHYLENE GLYCOL 3350 17 G/17G
17 POWDER, FOR SOLUTION ORAL DAILY
Status: DISCONTINUED | OUTPATIENT
Start: 2021-09-10 | End: 2021-09-13 | Stop reason: HOSPADM

## 2021-09-09 RX ORDER — ONDANSETRON 2 MG/ML
4 INJECTION INTRAMUSCULAR; INTRAVENOUS EVERY 6 HOURS PRN
Status: DISCONTINUED | OUTPATIENT
Start: 2021-09-09 | End: 2021-09-13 | Stop reason: HOSPADM

## 2021-09-09 RX ORDER — FLUMAZENIL 0.1 MG/ML
0.2 INJECTION, SOLUTION INTRAVENOUS
Status: DISCONTINUED | OUTPATIENT
Start: 2021-09-09 | End: 2021-09-09 | Stop reason: HOSPADM

## 2021-09-09 RX ORDER — HYDRALAZINE HYDROCHLORIDE 20 MG/ML
2.5-5 INJECTION INTRAMUSCULAR; INTRAVENOUS EVERY 10 MIN PRN
Status: DISCONTINUED | OUTPATIENT
Start: 2021-09-09 | End: 2021-09-09 | Stop reason: HOSPADM

## 2021-09-09 RX ORDER — ONDANSETRON 4 MG/1
4 TABLET, ORALLY DISINTEGRATING ORAL EVERY 30 MIN PRN
Status: DISCONTINUED | OUTPATIENT
Start: 2021-09-09 | End: 2021-09-09 | Stop reason: HOSPADM

## 2021-09-09 RX ORDER — HYDROMORPHONE HCL IN WATER/PF 6 MG/30 ML
0.2 PATIENT CONTROLLED ANALGESIA SYRINGE INTRAVENOUS EVERY 5 MIN PRN
Status: DISCONTINUED | OUTPATIENT
Start: 2021-09-09 | End: 2021-09-09 | Stop reason: HOSPADM

## 2021-09-09 RX ORDER — PROPOFOL 10 MG/ML
INJECTION, EMULSION INTRAVENOUS PRN
Status: DISCONTINUED | OUTPATIENT
Start: 2021-09-09 | End: 2021-09-09

## 2021-09-09 RX ORDER — ONDANSETRON 2 MG/ML
INJECTION INTRAMUSCULAR; INTRAVENOUS PRN
Status: DISCONTINUED | OUTPATIENT
Start: 2021-09-09 | End: 2021-09-09

## 2021-09-09 RX ORDER — NALOXONE HYDROCHLORIDE 0.4 MG/ML
0.4 INJECTION, SOLUTION INTRAMUSCULAR; INTRAVENOUS; SUBCUTANEOUS
Status: DISCONTINUED | OUTPATIENT
Start: 2021-09-09 | End: 2021-09-09 | Stop reason: HOSPADM

## 2021-09-09 RX ORDER — ACETAMINOPHEN 325 MG/1
650 TABLET ORAL EVERY 4 HOURS PRN
Status: DISCONTINUED | OUTPATIENT
Start: 2021-09-12 | End: 2021-09-13 | Stop reason: HOSPADM

## 2021-09-09 RX ORDER — FENTANYL CITRATE 50 UG/ML
25-50 INJECTION, SOLUTION INTRAMUSCULAR; INTRAVENOUS
Status: DISCONTINUED | OUTPATIENT
Start: 2021-09-09 | End: 2021-09-09 | Stop reason: HOSPADM

## 2021-09-09 RX ORDER — GABAPENTIN 100 MG/1
100 CAPSULE ORAL 3 TIMES DAILY
Status: DISCONTINUED | OUTPATIENT
Start: 2021-09-09 | End: 2021-09-13 | Stop reason: HOSPADM

## 2021-09-09 RX ORDER — ACETAMINOPHEN 325 MG/1
975 TABLET ORAL EVERY 8 HOURS
Status: DISCONTINUED | OUTPATIENT
Start: 2021-09-09 | End: 2021-09-12

## 2021-09-09 RX ORDER — NALOXONE HYDROCHLORIDE 0.4 MG/ML
0.4 INJECTION, SOLUTION INTRAMUSCULAR; INTRAVENOUS; SUBCUTANEOUS
Status: DISCONTINUED | OUTPATIENT
Start: 2021-09-09 | End: 2021-09-13 | Stop reason: HOSPADM

## 2021-09-09 RX ORDER — ONDANSETRON 2 MG/ML
4 INJECTION INTRAMUSCULAR; INTRAVENOUS EVERY 30 MIN PRN
Status: DISCONTINUED | OUTPATIENT
Start: 2021-09-09 | End: 2021-09-09 | Stop reason: HOSPADM

## 2021-09-09 RX ORDER — FENTANYL CITRATE 50 UG/ML
25-50 INJECTION, SOLUTION INTRAMUSCULAR; INTRAVENOUS EVERY 5 MIN PRN
Status: CANCELLED | OUTPATIENT
Start: 2021-09-09 | End: 2021-09-09

## 2021-09-09 RX ORDER — MEPERIDINE HYDROCHLORIDE 25 MG/ML
12.5 INJECTION INTRAMUSCULAR; INTRAVENOUS; SUBCUTANEOUS
Status: DISCONTINUED | OUTPATIENT
Start: 2021-09-09 | End: 2021-09-09 | Stop reason: HOSPADM

## 2021-09-09 RX ORDER — LEVOFLOXACIN 5 MG/ML
500 INJECTION, SOLUTION INTRAVENOUS
Status: COMPLETED | OUTPATIENT
Start: 2021-09-09 | End: 2021-09-09

## 2021-09-09 RX ADMIN — ACETAMINOPHEN 975 MG: 325 TABLET, FILM COATED ORAL at 17:13

## 2021-09-09 RX ADMIN — GABAPENTIN 100 MG: 100 CAPSULE ORAL at 21:57

## 2021-09-09 RX ADMIN — MIDAZOLAM 1 MG: 1 INJECTION INTRAMUSCULAR; INTRAVENOUS at 08:25

## 2021-09-09 RX ADMIN — ONDANSETRON 4 MG: 2 INJECTION INTRAMUSCULAR; INTRAVENOUS at 08:44

## 2021-09-09 RX ADMIN — PHENYLEPHRINE HYDROCHLORIDE 200 MCG: 10 INJECTION INTRAVENOUS at 10:38

## 2021-09-09 RX ADMIN — ENOXAPARIN SODIUM 40 MG: 40 INJECTION, SOLUTION INTRAVENOUS; SUBCUTANEOUS at 09:26

## 2021-09-09 RX ADMIN — PHENYLEPHRINE HYDROCHLORIDE 100 MCG: 10 INJECTION INTRAVENOUS at 09:27

## 2021-09-09 RX ADMIN — ROCURONIUM BROMIDE 10 MG: 10 INJECTION INTRAVENOUS at 10:50

## 2021-09-09 RX ADMIN — FENTANYL CITRATE 50 MCG: 50 INJECTION, SOLUTION INTRAMUSCULAR; INTRAVENOUS at 09:17

## 2021-09-09 RX ADMIN — ROCURONIUM BROMIDE 20 MG: 10 INJECTION INTRAVENOUS at 09:52

## 2021-09-09 RX ADMIN — FENTANYL CITRATE 200 MCG: 50 INJECTION, SOLUTION INTRAMUSCULAR; INTRAVENOUS at 08:44

## 2021-09-09 RX ADMIN — PROPOFOL 120 MG: 10 INJECTION, EMULSION INTRAVENOUS at 08:45

## 2021-09-09 RX ADMIN — SODIUM CHLORIDE, POTASSIUM CHLORIDE, SODIUM LACTATE AND CALCIUM CHLORIDE: 600; 310; 30; 20 INJECTION, SOLUTION INTRAVENOUS at 12:29

## 2021-09-09 RX ADMIN — FENTANYL CITRATE 50 MCG: 50 INJECTION, SOLUTION INTRAMUSCULAR; INTRAVENOUS at 08:25

## 2021-09-09 RX ADMIN — Medication: at 13:29

## 2021-09-09 RX ADMIN — MIDAZOLAM 2 MG: 1 INJECTION INTRAMUSCULAR; INTRAVENOUS at 08:44

## 2021-09-09 RX ADMIN — PHENYLEPHRINE HYDROCHLORIDE 100 MCG: 10 INJECTION INTRAVENOUS at 08:57

## 2021-09-09 RX ADMIN — HYDROMORPHONE HYDROCHLORIDE 0.5 MG: 1 INJECTION, SOLUTION INTRAMUSCULAR; INTRAVENOUS; SUBCUTANEOUS at 11:03

## 2021-09-09 RX ADMIN — SODIUM CHLORIDE, POTASSIUM CHLORIDE, SODIUM LACTATE AND CALCIUM CHLORIDE: 600; 310; 30; 20 INJECTION, SOLUTION INTRAVENOUS at 22:17

## 2021-09-09 RX ADMIN — ROCURONIUM BROMIDE 50 MG: 10 INJECTION INTRAVENOUS at 08:45

## 2021-09-09 RX ADMIN — BUPIVACAINE 20 ML: 13.3 INJECTION, SUSPENSION, LIPOSOMAL INFILTRATION at 08:32

## 2021-09-09 RX ADMIN — LIDOCAINE HYDROCHLORIDE 100 MG: 20 INJECTION, SOLUTION INFILTRATION; PERINEURAL at 08:44

## 2021-09-09 RX ADMIN — FENTANYL CITRATE 50 MCG: 50 INJECTION, SOLUTION INTRAMUSCULAR; INTRAVENOUS at 10:01

## 2021-09-09 RX ADMIN — SODIUM CHLORIDE, POTASSIUM CHLORIDE, SODIUM LACTATE AND CALCIUM CHLORIDE: 600; 310; 30; 20 INJECTION, SOLUTION INTRAVENOUS at 08:37

## 2021-09-09 RX ADMIN — PHENYLEPHRINE HYDROCHLORIDE 100 MCG: 10 INJECTION INTRAVENOUS at 10:11

## 2021-09-09 RX ADMIN — DEXAMETHASONE SODIUM PHOSPHATE 6 MG: 4 INJECTION, SOLUTION INTRA-ARTICULAR; INTRALESIONAL; INTRAMUSCULAR; INTRAVENOUS; SOFT TISSUE at 08:45

## 2021-09-09 RX ADMIN — FENTANYL CITRATE 50 MCG: 50 INJECTION, SOLUTION INTRAMUSCULAR; INTRAVENOUS at 10:11

## 2021-09-09 RX ADMIN — METRONIDAZOLE 500 MG: 500 INJECTION, SOLUTION INTRAVENOUS at 08:08

## 2021-09-09 RX ADMIN — BUPIVACAINE HYDROCHLORIDE 30 ML: 2.5 INJECTION, SOLUTION EPIDURAL; INFILTRATION; INTRACAUDAL; PERINEURAL at 08:32

## 2021-09-09 RX ADMIN — LEVOFLOXACIN 500 MG: 5 INJECTION, SOLUTION INTRAVENOUS at 08:58

## 2021-09-09 RX ADMIN — SUGAMMADEX 200 MG: 100 INJECTION, SOLUTION INTRAVENOUS at 11:57

## 2021-09-09 RX ADMIN — PHENYLEPHRINE HYDROCHLORIDE 100 MCG: 10 INJECTION INTRAVENOUS at 10:01

## 2021-09-09 RX ADMIN — OXYCODONE HYDROCHLORIDE 5 MG: 5 TABLET ORAL at 14:47

## 2021-09-09 ASSESSMENT — MIFFLIN-ST. JEOR: SCORE: 1547.63

## 2021-09-09 ASSESSMENT — ACTIVITIES OF DAILY LIVING (ADL): ADLS_ACUITY_SCORE: 14

## 2021-09-09 NOTE — ANESTHESIA CARE TRANSFER NOTE
Patient: Cyrus Huertas Jr.    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    Anesthesia Type:   General     Note:    Oropharynx: oropharynx clear of all foreign objects and spontaneously breathing  Level of Consciousness: drowsy  Oxygen Supplementation: nasal cannula  Level of Supplemental Oxygen (L/min / FiO2): 3  Independent Airway: airway patency satisfactory and stable  Dentition: dentition unchanged  Vital Signs Stable: post-procedure vital signs reviewed and stable  Report to RN Given: handoff report given  Patient transferred to: PACU    Handoff Report: Identifed the Patient, Identified the Reponsible Provider, Reviewed the pertinent medical history, Discussed the surgical course, Reviewed Intra-OP anesthesia mangement and issues during anesthesia, Set expectations for post-procedure period and Allowed opportunity for questions and acknowledgement of understanding      Vitals:  Vitals Value Taken Time   /67 09/09/21 1209   Temp     Pulse 99 09/09/21 1216   Resp 8 09/09/21 1216   SpO2 100 % 09/09/21 1216   Vitals shown include unvalidated device data.    Electronically Signed By: DEXTER Spear CRNA  September 9, 2021  12:18 PM

## 2021-09-09 NOTE — OR NURSING
New Wave Microwave ablation of liver tumors, 2 PRXT20 probes used at 95 gomez for 6 minutes and 30 seconds each total.

## 2021-09-09 NOTE — ANESTHESIA PROCEDURE NOTES
TAP Procedure Note  Pre-Procedure   Staff -        Anesthesiologist:  Naif Cerrato MD       Resident/Fellow: Santos Moore DO       Performed By: resident       Location: pre-op       Procedure Start/Stop Times: 9/9/2021 8:20 AM and 9/9/2021 8:30 AM       Pre-Anesthestic Checklist: patient identified, IV checked, site marked, risks and benefits discussed, informed consent, monitors and equipment checked, pre-op evaluation, at physician/surgeon's request and post-op pain management  Timeout:       Correct Patient: Yes        Correct Procedure: Yes        Correct Site: Yes        Correct Position: Yes        Correct Laterality: Yes        Site Marked: Yes  Procedure Documentation  Procedure: TAP       Diagnosis: POST OPERATIVE PAIN       Laterality: bilateral       Patient Position: supine       Patient Prep/Sterile Barriers: sterile gloves, mask       Skin prep: Chloraprep       Needle Type: short bevel       Needle Gauge: 21.        Needle Length (millimeters): 110        Ultrasound guided       1. Ultrasound was used to identify targeted nerve, plexus, vascular marker, or fascial plane and place a needle adjacent to it in real-time.       2. Ultrasound was used to visualize the spread of anesthetic in close proximity to the above referenced structure.       3. A permanent image is entered into the patient's record.    Assessment/Narrative         The placement was negative for: blood aspirated, painful injection and site bleeding       Paresthesias: No.     Bolus given via needle..        Secured via.        Insertion/Infusion Method: Single Shot       Complications: none       Injection made incrementally with aspirations every 5 mL.    Comments:  B/l subcostal

## 2021-09-09 NOTE — ANESTHESIA PROCEDURE NOTES
Airway       Patient location during procedure: OR       Procedure Start/Stop Times: 9/9/2021 8:49 AM  Staff -        Anesthesiologist:  Hugo Reynoso MD       CRNA: Tresa Aviles APRN CRNA       Performed By: SRNA  Consent for Airway        Urgency: elective  Indications and Patient Condition       Indications for airway management: eduard-procedural       Induction type:intravenous       Mask difficulty assessment: 2 - vent by mask + OA or adjuvant +/- NMBA    Final Airway Details       Final airway type: endotracheal airway       Successful airway: ETT - single and Oral  Endotracheal Airway Details        ETT size (mm): 7.5       Cuffed: yes       Successful intubation technique: direct laryngoscopy       DL Blade Type: MAC 3       Grade View of Cords: 1       Adjucts: stylet       Position: Right       Measured from: gums/teeth       Secured at (cm): 20       Bite block used: None    Post intubation assessment        Placement verified by: capnometry, equal breath sounds and chest rise        Number of attempts at approach: 1       Secured with: silk tape       Ease of procedure: easy       Dentition: Intact and Unchanged

## 2021-09-09 NOTE — BRIEF OP NOTE
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], metastatic to liver  Post-operative diagnosis Same as pre-operative diagnosis    Procedure: Procedure(s):  Exploratory laparoscopy  Laparoscopic lysis adhesions  Intraoperative hepatic ultrasound  Laparoscopic hand assisted partial left hepatectomy      Surgeon: Surgeon(s) and Role:     * Arvin Fischer MD - Primary     * Edilberto Riley MD - Resident - Assisting  Anesthesia: Combined General with Block   Estimated blood loss: 20 mL  Drains: None  Specimens:   ID Type Source Tests Collected by Time Destination   1 : Partial left hepatectomy segments 2 and 3 Tissue Liver SURGICAL PATHOLOGY EXAM Arvin Fischer MD 9/9/2021 10:33 AM      Findings:   laparoscopic left lateral sectionectomy with microwave precoagulation.  Complications: deep partial thickness burn at ablation probe insertion site.  Implants: * No implants in log *

## 2021-09-09 NOTE — ANESTHESIA POSTPROCEDURE EVALUATION
Patient: Cyrus Huertas Jr.    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    Anesthesia Type:  General    Note:  Disposition: Inpatient   Postop Pain Control: Uneventful            Sign Out: Well controlled pain   PONV: No   Neuro/Psych: Uneventful            Sign Out: Acceptable/Baseline neuro status   Airway/Respiratory: Uneventful            Sign Out: Acceptable/Baseline resp. status   CV/Hemodynamics: Uneventful            Sign Out: Acceptable CV status; No obvious hypovolemia; No obvious fluid overload   Other NRE: NONE   DID A NON-ROUTINE EVENT OCCUR? No           Last vitals:  Vitals Value Taken Time   /81 09/09/21 1500   Temp 36.7  C (98.1  F) 09/09/21 1345   Pulse 82 09/09/21 1510   Resp 22 09/09/21 1510   SpO2 100 % 09/09/21 1510   Vitals shown include unvalidated device data.    Electronically Signed By: Sudheer Ariza MD  September 9, 2021  3:11 PM

## 2021-09-10 LAB
ALBUMIN SERPL-MCNC: 3.2 G/DL (ref 3.4–5)
ALP SERPL-CCNC: 88 U/L (ref 40–150)
ALT SERPL W P-5'-P-CCNC: 133 U/L (ref 0–70)
ANION GAP SERPL CALCULATED.3IONS-SCNC: 5 MMOL/L (ref 3–14)
AST SERPL W P-5'-P-CCNC: 190 U/L (ref 0–45)
BASOPHILS # BLD AUTO: 0 10E3/UL (ref 0–0.2)
BASOPHILS NFR BLD AUTO: 0 %
BILIRUB SERPL-MCNC: 0.6 MG/DL (ref 0.2–1.3)
BUN SERPL-MCNC: 18 MG/DL (ref 7–30)
CALCIUM SERPL-MCNC: 8.9 MG/DL (ref 8.5–10.1)
CHLORIDE BLD-SCNC: 105 MMOL/L (ref 94–109)
CO2 SERPL-SCNC: 29 MMOL/L (ref 20–32)
CREAT SERPL-MCNC: 1.65 MG/DL (ref 0.66–1.25)
EOSINOPHIL # BLD AUTO: 0 10E3/UL (ref 0–0.7)
EOSINOPHIL NFR BLD AUTO: 0 %
ERYTHROCYTE [DISTWIDTH] IN BLOOD BY AUTOMATED COUNT: 14.3 % (ref 10–15)
GFR SERPL CREATININE-BSD FRML MDRD: 56 ML/MIN/1.73M2
GLUCOSE BLD-MCNC: 93 MG/DL (ref 70–99)
GLUCOSE BLDC GLUCOMTR-MCNC: 96 MG/DL (ref 70–99)
HCT VFR BLD AUTO: 26.5 % (ref 40–53)
HGB BLD-MCNC: 8.3 G/DL (ref 13.3–17.7)
IMM GRANULOCYTES # BLD: 0 10E3/UL
IMM GRANULOCYTES NFR BLD: 1 %
LYMPHOCYTES # BLD AUTO: 1.2 10E3/UL (ref 0.8–5.3)
LYMPHOCYTES NFR BLD AUTO: 14 %
MCH RBC QN AUTO: 33.2 PG (ref 26.5–33)
MCHC RBC AUTO-ENTMCNC: 31.3 G/DL (ref 31.5–36.5)
MCV RBC AUTO: 106 FL (ref 78–100)
MONOCYTES # BLD AUTO: 0.7 10E3/UL (ref 0–1.3)
MONOCYTES NFR BLD AUTO: 9 %
NEUTROPHILS # BLD AUTO: 6.5 10E3/UL (ref 1.6–8.3)
NEUTROPHILS NFR BLD AUTO: 76 %
NRBC # BLD AUTO: 0 10E3/UL
NRBC BLD AUTO-RTO: 0 /100
PHOSPHATE SERPL-MCNC: 3.3 MG/DL (ref 2.5–4.5)
PLATELET # BLD AUTO: 130 10E3/UL (ref 150–450)
POTASSIUM BLD-SCNC: 4 MMOL/L (ref 3.4–5.3)
PROT SERPL-MCNC: 6.6 G/DL (ref 6.8–8.8)
RBC # BLD AUTO: 2.5 10E6/UL (ref 4.4–5.9)
SODIUM SERPL-SCNC: 139 MMOL/L (ref 133–144)
WBC # BLD AUTO: 8.5 10E3/UL (ref 4–11)

## 2021-09-10 PROCEDURE — 258N000003 HC RX IP 258 OP 636: Performed by: STUDENT IN AN ORGANIZED HEALTH CARE EDUCATION/TRAINING PROGRAM

## 2021-09-10 PROCEDURE — 36415 COLL VENOUS BLD VENIPUNCTURE: CPT | Performed by: STUDENT IN AN ORGANIZED HEALTH CARE EDUCATION/TRAINING PROGRAM

## 2021-09-10 PROCEDURE — 120N000002 HC R&B MED SURG/OB UMMC

## 2021-09-10 PROCEDURE — 99231 SBSQ HOSP IP/OBS SF/LOW 25: CPT | Performed by: PHYSICIAN ASSISTANT

## 2021-09-10 PROCEDURE — 250N000011 HC RX IP 250 OP 636: Performed by: STUDENT IN AN ORGANIZED HEALTH CARE EDUCATION/TRAINING PROGRAM

## 2021-09-10 PROCEDURE — 85025 COMPLETE CBC W/AUTO DIFF WBC: CPT | Performed by: STUDENT IN AN ORGANIZED HEALTH CARE EDUCATION/TRAINING PROGRAM

## 2021-09-10 PROCEDURE — 250N000013 HC RX MED GY IP 250 OP 250 PS 637

## 2021-09-10 PROCEDURE — 258N000003 HC RX IP 258 OP 636: Performed by: PHYSICIAN ASSISTANT

## 2021-09-10 PROCEDURE — 80053 COMPREHEN METABOLIC PANEL: CPT | Performed by: STUDENT IN AN ORGANIZED HEALTH CARE EDUCATION/TRAINING PROGRAM

## 2021-09-10 PROCEDURE — 84100 ASSAY OF PHOSPHORUS: CPT | Performed by: STUDENT IN AN ORGANIZED HEALTH CARE EDUCATION/TRAINING PROGRAM

## 2021-09-10 PROCEDURE — 250N000013 HC RX MED GY IP 250 OP 250 PS 637: Performed by: STUDENT IN AN ORGANIZED HEALTH CARE EDUCATION/TRAINING PROGRAM

## 2021-09-10 RX ORDER — VIT C/VIT D3/E/ZINC/ELDERBERRY 65 MG-3.15
1 TABLET,CHEWABLE ORAL DAILY
COMMUNITY

## 2021-09-10 RX ORDER — DEXTROSE MONOHYDRATE, SODIUM CHLORIDE, AND POTASSIUM CHLORIDE 50; 1.49; 4.5 G/1000ML; G/1000ML; G/1000ML
INJECTION, SOLUTION INTRAVENOUS CONTINUOUS
Status: DISCONTINUED | OUTPATIENT
Start: 2021-09-10 | End: 2021-09-11

## 2021-09-10 RX ORDER — DIPHENHYDRAMINE HCL 25 MG
25 CAPSULE ORAL
Status: COMPLETED | OUTPATIENT
Start: 2021-09-10 | End: 2021-09-10

## 2021-09-10 RX ADMIN — SODIUM CHLORIDE, POTASSIUM CHLORIDE, SODIUM LACTATE AND CALCIUM CHLORIDE: 600; 310; 30; 20 INJECTION, SOLUTION INTRAVENOUS at 06:34

## 2021-09-10 RX ADMIN — ONDANSETRON 4 MG: 2 INJECTION INTRAMUSCULAR; INTRAVENOUS at 11:38

## 2021-09-10 RX ADMIN — GABAPENTIN 100 MG: 100 CAPSULE ORAL at 09:07

## 2021-09-10 RX ADMIN — GABAPENTIN 100 MG: 100 CAPSULE ORAL at 19:34

## 2021-09-10 RX ADMIN — GABAPENTIN 100 MG: 100 CAPSULE ORAL at 13:19

## 2021-09-10 RX ADMIN — ENOXAPARIN SODIUM 40 MG: 40 INJECTION SUBCUTANEOUS at 09:07

## 2021-09-10 RX ADMIN — ACETAMINOPHEN 975 MG: 325 TABLET, FILM COATED ORAL at 01:17

## 2021-09-10 RX ADMIN — ACETAMINOPHEN 975 MG: 325 TABLET, FILM COATED ORAL at 09:06

## 2021-09-10 RX ADMIN — DIPHENHYDRAMINE HYDROCHLORIDE 25 MG: 25 CAPSULE ORAL at 17:23

## 2021-09-10 RX ADMIN — POLYETHYLENE GLYCOL 3350 17 G: 17 POWDER, FOR SOLUTION ORAL at 09:06

## 2021-09-10 RX ADMIN — POTASSIUM CHLORIDE, DEXTROSE MONOHYDRATE AND SODIUM CHLORIDE: 150; 5; 450 INJECTION, SOLUTION INTRAVENOUS at 07:03

## 2021-09-10 RX ADMIN — POTASSIUM CHLORIDE, DEXTROSE MONOHYDRATE AND SODIUM CHLORIDE: 150; 5; 450 INJECTION, SOLUTION INTRAVENOUS at 19:32

## 2021-09-10 RX ADMIN — Medication: at 13:17

## 2021-09-10 RX ADMIN — ACETAMINOPHEN 975 MG: 325 TABLET, FILM COATED ORAL at 17:23

## 2021-09-10 ASSESSMENT — ACTIVITIES OF DAILY LIVING (ADL)
ADLS_ACUITY_SCORE: 14
ADLS_ACUITY_SCORE: 16

## 2021-09-10 ASSESSMENT — MIFFLIN-ST. JEOR: SCORE: 1536.45

## 2021-09-10 NOTE — PLAN OF CARE
Reason for Admission: POD #1 Lap hepaticotomy, germ cell tumor, neoplasm of  testis, mets to abdomen.  Procedures: n/a  IV Access: 2 L PIV  Incisions/Drains: Lap x3, transverse abdomen incision  Temp: 98.3  F (36.8  C) Temp src: Temporal BP: 111/70 Pulse: 79   Resp: 16 SpO2: 100 % O2 Device: None (Room air)    Oxygen: RA, capno on  Diet: NPO  Activity: SBA/Independent  Pain Management: PCA dilaudid pump  GI/: +BS, +gas, -BM. Townsend removed at 1352.  Rogelio: A&0 x4  Team: Surg onc    Shift Summary  Pt walked x2 on shift, had emesis after 1st walk and diet changed to NPO, Zofran given with relief. Lovenox teaching given. Pt had 4/10 pain on PCA pump, scheduled gabapentin and Tylenol also given. 2 lab sites and transverse incision on abdomen open to air and approximated. Sejal Hartley, RN on 9/10/2021 at 3:54 PM

## 2021-09-10 NOTE — PLAN OF CARE
Alert and oriented x4.  Calm, pleasant and coopertive.  VSS on RA with capno. Started on clear liquids this am.  Townsend intact and patent, clear yellow urine.  I&O's q4. Abdominal pain managed with Dilaudid PCA pump and scheduled Tylenol. 2 lap sites and transverse abdominal incision CDI with derma bond. 2 LPIV's, left hand infusing D5 in 1/2NS w/ KCl/PCA, other SL. States he is passing gas, no BM.  Denies nausea.

## 2021-09-10 NOTE — PROGRESS NOTES
Surgical Oncology Progress Note    Interval History:  No acute events overnight. Pain controlled. Denies nausea.     Physical Exam:   Temp:  [97.2  F (36.2  C)-98.6  F (37  C)] 98  F (36.7  C)  Pulse:  [] 70  Resp:  [8-16] 12  BP: (100-130)/(46-91) 100/61  SpO2:  [93 %-100 %] 96 %  General: Alert, oriented, appears comfortable, NAD.  Respiratory: breathing non labored  Abdomen: Abdomen is soft, non-tender, non-distended. Incision is clean, dry and intact.     Data:   All laboratory and imaging data in the past 24 hours reviewed  I/O last 3 completed shifts:  In: 2362 [P.O.:50; I.V.:2312]  Out: 2505 [Urine:2485; Blood:20]  Recent Labs   Lab Test 09/10/21  0735 09/09/21  1236 09/07/21  0845   WBC 8.5 9.8 3.6*   HGB 8.3* 9.4* 9.5*   * 157 152   INR  --  1.15  --       Recent Labs   Lab Test 09/10/21  0735 09/10/21  0650 09/09/21  1236 02/26/21  1216     --  138 140   POTASSIUM 4.0  --  4.6 4.2   CHLORIDE 105  --  111* 106   CO2 29  --  23 29   BUN 18  --  16 15   CR 1.65*  --  1.68* 1.36*   ANIONGAP 5  --  4 5   GAYE 8.9  --  8.7 9.6   GLC 93 96 104* 76      Recent Labs   Lab Test 09/10/21  0735   PROTTOTAL 6.6*   ALBUMIN 3.2*   BILITOTAL 0.6   ALKPHOS 88   *   *     Assessment and Plan:     Cyrus Huertas is a 26 year old male with a 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 He was found to have a liver mass. Now POD 1 s/p partial hepatectomy.     - Available for pain PCA, acetaminophen, gabapentin, oxycodone   - Clear liquid diet, decrease IVF, miralax   - Remove ibarra once ambulating  - Lovenox for DVT prophylaxis, teaching needed.    Seen with Chief resident who will discuss with Staff.     DANY PelayoC   Surgical Oncology

## 2021-09-10 NOTE — PHARMACY-ADMISSION MEDICATION HISTORY
Admission Medication History Completed by Pharmacy    See [facility] Admission Navigator for allergy information, preferred outpatient pharmacy, prior to admission medications and immunization status.     Medication History Sources:     patient    Changes made to PTA medication list (reason):    Added: elderberry, multivitamin    Deleted: None    Changed: None    Additional Information:    None    Prior to Admission medications    Medication Sig Last Dose Taking? Auth Provider   bisacodyl (DULCOLAX) 5 MG EC tablet daily as needed  Past Month at Unknown time Yes Reported, Patient   Misc Natural Products (AIRBORNE ELDERBERRY) CHEW Take 1 chew tab by mouth daily Past Week at Unknown time Yes Unknown, Entered By History   Multiple Vitamins-Minerals (MENS MULTI VITAMIN & MINERAL PO) Take 1 tablet by mouth daily Past Week at Unknown time Yes Unknown, Entered By History       Date completed: 09/10/21    Medication history completed by: Tresa Villela, ReglaD

## 2021-09-10 NOTE — PLAN OF CARE
"/82 (BP Location: Right arm)   Pulse 96   Temp 98  F (36.7  C) (Temporal)   Resp 16   Ht 1.702 m (5' 7\")   Wt 60.8 kg (134 lb)   SpO2 100%   BMI 20.99 kg/m      Neuro: A&Ox4. Itching managed with benadryl   GI/: Denies nausea; pt reports passing gas; No BM this shift.  Diet/appetite: Advanced diet to clears.  Activity: Up with SBA ambulating hallways.   Pain is managed with Dilaudid PCA, and scheduled tylenol.   Skin/drains: 2 Lap sites and transverse abdominal incision approximated with liquid bandage; FAVIOLA.   Lines: 2 PIVs infusing IVF/PCA. Continue POC.     "

## 2021-09-10 NOTE — PROGRESS NOTES
Admitted/transferred from:   2 RN full   skin assessment completed by Devi Dougherty RN and Erica Cavazos.  Skin assessment findin lap incisions, 2 PIVs, transverse abdominal incision.   Interventions/actions: No intervention required.      Will continue to monitor.

## 2021-09-11 LAB
ALBUMIN SERPL-MCNC: 3.1 G/DL (ref 3.4–5)
ALP SERPL-CCNC: 90 U/L (ref 40–150)
ALT SERPL W P-5'-P-CCNC: 128 U/L (ref 0–70)
ANION GAP SERPL CALCULATED.3IONS-SCNC: 6 MMOL/L (ref 3–14)
AST SERPL W P-5'-P-CCNC: 143 U/L (ref 0–45)
BASOPHILS # BLD AUTO: 0 10E3/UL (ref 0–0.2)
BASOPHILS NFR BLD AUTO: 1 %
BILIRUB SERPL-MCNC: 0.7 MG/DL (ref 0.2–1.3)
BUN SERPL-MCNC: 19 MG/DL (ref 7–30)
CALCIUM SERPL-MCNC: 9 MG/DL (ref 8.5–10.1)
CHLORIDE BLD-SCNC: 103 MMOL/L (ref 94–109)
CO2 SERPL-SCNC: 30 MMOL/L (ref 20–32)
CREAT SERPL-MCNC: 1.7 MG/DL (ref 0.66–1.25)
EOSINOPHIL # BLD AUTO: 0.1 10E3/UL (ref 0–0.7)
EOSINOPHIL NFR BLD AUTO: 1 %
ERYTHROCYTE [DISTWIDTH] IN BLOOD BY AUTOMATED COUNT: 14 % (ref 10–15)
GFR SERPL CREATININE-BSD FRML MDRD: 54 ML/MIN/1.73M2
GLUCOSE BLD-MCNC: 100 MG/DL (ref 70–99)
HCT VFR BLD AUTO: 24.1 % (ref 40–53)
HGB BLD-MCNC: 7.9 G/DL (ref 13.3–17.7)
IMM GRANULOCYTES # BLD: 0 10E3/UL
IMM GRANULOCYTES NFR BLD: 0 %
LYMPHOCYTES # BLD AUTO: 1.3 10E3/UL (ref 0.8–5.3)
LYMPHOCYTES NFR BLD AUTO: 20 %
MCH RBC QN AUTO: 32.9 PG (ref 26.5–33)
MCHC RBC AUTO-ENTMCNC: 32.8 G/DL (ref 31.5–36.5)
MCV RBC AUTO: 100 FL (ref 78–100)
MONOCYTES # BLD AUTO: 0.8 10E3/UL (ref 0–1.3)
MONOCYTES NFR BLD AUTO: 12 %
NEUTROPHILS # BLD AUTO: 4.1 10E3/UL (ref 1.6–8.3)
NEUTROPHILS NFR BLD AUTO: 66 %
NRBC # BLD AUTO: 0 10E3/UL
NRBC BLD AUTO-RTO: 0 /100
PHOSPHATE SERPL-MCNC: 3.2 MG/DL (ref 2.5–4.5)
PLATELET # BLD AUTO: 117 10E3/UL (ref 150–450)
POTASSIUM BLD-SCNC: 3.8 MMOL/L (ref 3.4–5.3)
PROT SERPL-MCNC: 6.6 G/DL (ref 6.8–8.8)
RBC # BLD AUTO: 2.4 10E6/UL (ref 4.4–5.9)
SODIUM SERPL-SCNC: 139 MMOL/L (ref 133–144)
WBC # BLD AUTO: 6.3 10E3/UL (ref 4–11)

## 2021-09-11 PROCEDURE — 84100 ASSAY OF PHOSPHORUS: CPT | Performed by: STUDENT IN AN ORGANIZED HEALTH CARE EDUCATION/TRAINING PROGRAM

## 2021-09-11 PROCEDURE — 250N000011 HC RX IP 250 OP 636: Performed by: STUDENT IN AN ORGANIZED HEALTH CARE EDUCATION/TRAINING PROGRAM

## 2021-09-11 PROCEDURE — 36415 COLL VENOUS BLD VENIPUNCTURE: CPT | Performed by: STUDENT IN AN ORGANIZED HEALTH CARE EDUCATION/TRAINING PROGRAM

## 2021-09-11 PROCEDURE — 258N000003 HC RX IP 258 OP 636: Performed by: PHYSICIAN ASSISTANT

## 2021-09-11 PROCEDURE — 82040 ASSAY OF SERUM ALBUMIN: CPT | Performed by: STUDENT IN AN ORGANIZED HEALTH CARE EDUCATION/TRAINING PROGRAM

## 2021-09-11 PROCEDURE — 85025 COMPLETE CBC W/AUTO DIFF WBC: CPT | Performed by: STUDENT IN AN ORGANIZED HEALTH CARE EDUCATION/TRAINING PROGRAM

## 2021-09-11 PROCEDURE — 250N000013 HC RX MED GY IP 250 OP 250 PS 637: Performed by: STUDENT IN AN ORGANIZED HEALTH CARE EDUCATION/TRAINING PROGRAM

## 2021-09-11 PROCEDURE — 120N000002 HC R&B MED SURG/OB UMMC

## 2021-09-11 RX ORDER — HEPARIN SODIUM (PORCINE) LOCK FLUSH IV SOLN 100 UNIT/ML 100 UNIT/ML
5-10 SOLUTION INTRAVENOUS
Status: DISCONTINUED | OUTPATIENT
Start: 2021-09-11 | End: 2021-09-13 | Stop reason: HOSPADM

## 2021-09-11 RX ORDER — OXYCODONE HYDROCHLORIDE 5 MG/1
5-10 TABLET ORAL EVERY 4 HOURS PRN
Status: DISCONTINUED | OUTPATIENT
Start: 2021-09-11 | End: 2021-09-12

## 2021-09-11 RX ORDER — HEPARIN SODIUM,PORCINE 10 UNIT/ML
5-10 VIAL (ML) INTRAVENOUS EVERY 24 HOURS
Status: DISCONTINUED | OUTPATIENT
Start: 2021-09-11 | End: 2021-09-13 | Stop reason: HOSPADM

## 2021-09-11 RX ORDER — HYDROMORPHONE HYDROCHLORIDE 1 MG/ML
.3-.5 INJECTION, SOLUTION INTRAMUSCULAR; INTRAVENOUS; SUBCUTANEOUS EVERY 4 HOURS PRN
Status: DISCONTINUED | OUTPATIENT
Start: 2021-09-11 | End: 2021-09-13 | Stop reason: HOSPADM

## 2021-09-11 RX ORDER — OXYCODONE HYDROCHLORIDE 5 MG/1
5-10 TABLET ORAL EVERY 6 HOURS PRN
Qty: 20 TABLET | Refills: 0 | Status: SHIPPED | OUTPATIENT
Start: 2021-09-11 | End: 2021-09-13

## 2021-09-11 RX ORDER — HEPARIN SODIUM,PORCINE 10 UNIT/ML
5-10 VIAL (ML) INTRAVENOUS
Status: DISCONTINUED | OUTPATIENT
Start: 2021-09-11 | End: 2021-09-13 | Stop reason: HOSPADM

## 2021-09-11 RX ADMIN — POTASSIUM CHLORIDE, DEXTROSE MONOHYDRATE AND SODIUM CHLORIDE: 150; 5; 450 INJECTION, SOLUTION INTRAVENOUS at 10:35

## 2021-09-11 RX ADMIN — POLYETHYLENE GLYCOL 3350 17 G: 17 POWDER, FOR SOLUTION ORAL at 07:56

## 2021-09-11 RX ADMIN — METHOCARBAMOL 750 MG: 750 TABLET ORAL at 11:40

## 2021-09-11 RX ADMIN — ACETAMINOPHEN 975 MG: 325 TABLET, FILM COATED ORAL at 01:29

## 2021-09-11 RX ADMIN — OXYCODONE HYDROCHLORIDE 5 MG: 5 TABLET ORAL at 11:40

## 2021-09-11 RX ADMIN — GABAPENTIN 100 MG: 100 CAPSULE ORAL at 20:35

## 2021-09-11 RX ADMIN — ENOXAPARIN SODIUM 40 MG: 40 INJECTION SUBCUTANEOUS at 07:51

## 2021-09-11 RX ADMIN — METHOCARBAMOL 750 MG: 750 TABLET ORAL at 21:13

## 2021-09-11 RX ADMIN — GABAPENTIN 100 MG: 100 CAPSULE ORAL at 07:51

## 2021-09-11 RX ADMIN — Medication 5 ML: at 11:41

## 2021-09-11 RX ADMIN — ACETAMINOPHEN 975 MG: 325 TABLET, FILM COATED ORAL at 11:40

## 2021-09-11 RX ADMIN — OXYCODONE HYDROCHLORIDE 10 MG: 5 TABLET ORAL at 17:09

## 2021-09-11 RX ADMIN — Medication 5 ML: at 23:55

## 2021-09-11 RX ADMIN — HYDROMORPHONE HYDROCHLORIDE 0.3 MG: 1 INJECTION, SOLUTION INTRAMUSCULAR; INTRAVENOUS; SUBCUTANEOUS at 18:42

## 2021-09-11 RX ADMIN — OXYCODONE HYDROCHLORIDE 10 MG: 5 TABLET ORAL at 21:12

## 2021-09-11 RX ADMIN — Medication 5 ML: at 18:29

## 2021-09-11 RX ADMIN — GABAPENTIN 100 MG: 100 CAPSULE ORAL at 14:37

## 2021-09-11 RX ADMIN — ACETAMINOPHEN 975 MG: 325 TABLET, FILM COATED ORAL at 18:26

## 2021-09-11 ASSESSMENT — ACTIVITIES OF DAILY LIVING (ADL)
ADLS_ACUITY_SCORE: 16

## 2021-09-11 NOTE — PROGRESS NOTES
Surgical Oncology Progress Note    Interval History:  Emesis yesterday after a large amount of PO but no acute events overnight. Pain controlled this morning.  He is tolerating his liquid diet and denies any N/V.  Patient is ambulating well and feeling hungry.  Incision site c/d/i.    Physical Exam:   Temp:  [98  F (36.7  C)-98.8  F (37.1  C)] 98.8  F (37.1  C)  Pulse:  [] 115  Resp:  [16] 16  BP: (111-126)/(67-82) 118/67  SpO2:  [95 %-100 %] 95 %     General: Alert, oriented, appears comfortable, NAD.  Respiratory: breathing non labored  Abdomen: Abdomen is soft, non-tender, non-distended. Incision is clean, dry and intact. Small, subcentimeter superficial partial thickness burn to midline     Data:   All laboratory and imaging data in the past 24 hours reviewed  I/O last 3 completed shifts:  In: 2028 [P.O.:720; I.V.:1308]  Out: 1625 [Urine:1625]  Recent Labs   Lab Test 09/10/21  0735 09/09/21  1236 09/07/21  0845   WBC 8.5 9.8 3.6*   HGB 8.3* 9.4* 9.5*   * 157 152   INR  --  1.15  --       Recent Labs   Lab Test 09/10/21  0735 09/10/21  0650 09/09/21  1236 02/26/21  1216     --  138 140   POTASSIUM 4.0  --  4.6 4.2   CHLORIDE 105  --  111* 106   CO2 29  --  23 29   BUN 18  --  16 15   CR 1.65*  --  1.68* 1.36*   ANIONGAP 5  --  4 5   GAYE 8.9  --  8.7 9.6   GLC 93 96 104* 76        Recent Labs   Lab Test 09/10/21  0735   PROTTOTAL 6.6*   ALBUMIN 3.2*   BILITOTAL 0.6   ALKPHOS 88   *   *       Assessment and Plan:     Cyrus Huertas is a 26 year old male with a 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 He was found to have a liver mass. Now POD 2 s/p partial hepatectomy.      - Advance to Regular Diet as tolerated  - Discontinue PCA Dilaudid and start on oral Oxycodone and IV Dilaudid as needed  - Lovenox for DVT prophylaxis, teaching needed.  - Aquaphor and non-stick  dressing to burn on midline  - Possible discharge pending bowel movement and tolerating diet.    Seen with resident who will discuss with Staff.     MARISSA GUILLEN   MS 4   Surgical Oncology     Completed by    Ramila Kurtz MD   General Surgery PGY3  [phone]

## 2021-09-11 NOTE — PLAN OF CARE
Patient is A&O x 4. HR in 100s, otherwise VS WDL. Pain managed with Dilaudid PCA and scheduled Tylenol. Denies nausea. Clear liquid diet. Adequate urine output, urinal at bedside. No BM overnight, passing flatus, + BS. L hand PIV infusing. L FA PIV SL. Lap sites x 2 and transverse abdominal incision intact, Primapore over mid abdomen incision otherwise FAVIOLA.

## 2021-09-11 NOTE — PROGRESS NOTES
"[id number] Cyrus Huertas Jr. Accessed his port, please order heparin flushes! (Ora RN [id number])  Response: heparin flushes and VAD order set initiated.     Right chest port accessed and drawing back blood. Heparin locked after IV push medications and after lab draw. PCA discontinued; IV fluids discontinued. Transitioning to PO pain medications including oxycodone and robaxin. IV dilaudid PRN for severe pain.     Passing gas. Advanced from clear liquids to regular diet today. Abdominal incisions intact, all FAVIOLA with exception of one that is covered (see orders for dressing cares).     Patient moves independently, calls appropriately for assistance. Ambulated halls x 1 on writer's shift.     Intermittently tachycardic, not within notifying parameters. /68 (BP Location: Right arm)   Pulse 104   Temp 98.7  F (37.1  C) (Temporal)   Resp 12   Ht 1.702 m (5' 7\")   Wt 60.8 kg (134 lb)   SpO2 100%   BMI 20.99 kg/m  .   "

## 2021-09-12 LAB
ALBUMIN SERPL-MCNC: 2.9 G/DL (ref 3.4–5)
ALP SERPL-CCNC: 100 U/L (ref 40–150)
ALT SERPL W P-5'-P-CCNC: 97 U/L (ref 0–70)
ANION GAP SERPL CALCULATED.3IONS-SCNC: 5 MMOL/L (ref 3–14)
AST SERPL W P-5'-P-CCNC: 83 U/L (ref 0–45)
BASOPHILS # BLD AUTO: 0 10E3/UL (ref 0–0.2)
BASOPHILS NFR BLD AUTO: 0 %
BILIRUB SERPL-MCNC: 0.6 MG/DL (ref 0.2–1.3)
BUN SERPL-MCNC: 19 MG/DL (ref 7–30)
CALCIUM SERPL-MCNC: 9.3 MG/DL (ref 8.5–10.1)
CHLORIDE BLD-SCNC: 103 MMOL/L (ref 94–109)
CO2 SERPL-SCNC: 30 MMOL/L (ref 20–32)
CREAT SERPL-MCNC: 1.67 MG/DL (ref 0.66–1.25)
EOSINOPHIL # BLD AUTO: 0.1 10E3/UL (ref 0–0.7)
EOSINOPHIL NFR BLD AUTO: 1 %
ERYTHROCYTE [DISTWIDTH] IN BLOOD BY AUTOMATED COUNT: 13.8 % (ref 10–15)
GFR SERPL CREATININE-BSD FRML MDRD: 55 ML/MIN/1.73M2
GLUCOSE BLD-MCNC: 83 MG/DL (ref 70–99)
HCT VFR BLD AUTO: 22.6 % (ref 40–53)
HGB BLD-MCNC: 7.4 G/DL (ref 13.3–17.7)
IMM GRANULOCYTES # BLD: 0 10E3/UL
IMM GRANULOCYTES NFR BLD: 0 %
LYMPHOCYTES # BLD AUTO: 0.9 10E3/UL (ref 0.8–5.3)
LYMPHOCYTES NFR BLD AUTO: 17 %
MCH RBC QN AUTO: 32.7 PG (ref 26.5–33)
MCHC RBC AUTO-ENTMCNC: 32.7 G/DL (ref 31.5–36.5)
MCV RBC AUTO: 100 FL (ref 78–100)
MONOCYTES # BLD AUTO: 0.5 10E3/UL (ref 0–1.3)
MONOCYTES NFR BLD AUTO: 8 %
NEUTROPHILS # BLD AUTO: 4.1 10E3/UL (ref 1.6–8.3)
NEUTROPHILS NFR BLD AUTO: 74 %
NRBC # BLD AUTO: 0 10E3/UL
NRBC BLD AUTO-RTO: 0 /100
PHOSPHATE SERPL-MCNC: 2 MG/DL (ref 2.5–4.5)
PLATELET # BLD AUTO: 114 10E3/UL (ref 150–450)
POTASSIUM BLD-SCNC: 3.7 MMOL/L (ref 3.4–5.3)
PROT SERPL-MCNC: 6.4 G/DL (ref 6.8–8.8)
RBC # BLD AUTO: 2.26 10E6/UL (ref 4.4–5.9)
SODIUM SERPL-SCNC: 138 MMOL/L (ref 133–144)
WBC # BLD AUTO: 5.5 10E3/UL (ref 4–11)

## 2021-09-12 PROCEDURE — 36591 DRAW BLOOD OFF VENOUS DEVICE: CPT | Performed by: STUDENT IN AN ORGANIZED HEALTH CARE EDUCATION/TRAINING PROGRAM

## 2021-09-12 PROCEDURE — 85004 AUTOMATED DIFF WBC COUNT: CPT | Performed by: STUDENT IN AN ORGANIZED HEALTH CARE EDUCATION/TRAINING PROGRAM

## 2021-09-12 PROCEDURE — 82040 ASSAY OF SERUM ALBUMIN: CPT | Performed by: STUDENT IN AN ORGANIZED HEALTH CARE EDUCATION/TRAINING PROGRAM

## 2021-09-12 PROCEDURE — 250N000013 HC RX MED GY IP 250 OP 250 PS 637: Performed by: STUDENT IN AN ORGANIZED HEALTH CARE EDUCATION/TRAINING PROGRAM

## 2021-09-12 PROCEDURE — 82247 BILIRUBIN TOTAL: CPT | Performed by: STUDENT IN AN ORGANIZED HEALTH CARE EDUCATION/TRAINING PROGRAM

## 2021-09-12 PROCEDURE — 250N000009 HC RX 250: Performed by: STUDENT IN AN ORGANIZED HEALTH CARE EDUCATION/TRAINING PROGRAM

## 2021-09-12 PROCEDURE — 250N000011 HC RX IP 250 OP 636: Performed by: STUDENT IN AN ORGANIZED HEALTH CARE EDUCATION/TRAINING PROGRAM

## 2021-09-12 PROCEDURE — 84100 ASSAY OF PHOSPHORUS: CPT | Performed by: STUDENT IN AN ORGANIZED HEALTH CARE EDUCATION/TRAINING PROGRAM

## 2021-09-12 PROCEDURE — 120N000002 HC R&B MED SURG/OB UMMC

## 2021-09-12 RX ORDER — POLYETHYLENE GLYCOL 3350 17 G/17G
17 POWDER, FOR SOLUTION ORAL DAILY
Qty: 510 G | Refills: 0 | Status: SHIPPED | OUTPATIENT
Start: 2021-09-12 | End: 2021-09-26

## 2021-09-12 RX ORDER — HYDROMORPHONE HYDROCHLORIDE 2 MG/1
2 TABLET ORAL EVERY 4 HOURS PRN
Status: DISCONTINUED | OUTPATIENT
Start: 2021-09-12 | End: 2021-09-13 | Stop reason: HOSPADM

## 2021-09-12 RX ORDER — METHOCARBAMOL 500 MG/1
500 TABLET, FILM COATED ORAL EVERY 6 HOURS PRN
Qty: 28 TABLET | Refills: 0 | Status: SHIPPED | OUTPATIENT
Start: 2021-09-12 | End: 2021-09-19

## 2021-09-12 RX ORDER — MINERAL OIL/HYDROPHIL PETROLAT
OINTMENT (GRAM) TOPICAL 2 TIMES DAILY
Qty: 198 G | Refills: 0 | Status: SHIPPED | OUTPATIENT
Start: 2021-09-12

## 2021-09-12 RX ORDER — GINSENG 100 MG
CAPSULE ORAL 2 TIMES DAILY
Status: DISCONTINUED | OUTPATIENT
Start: 2021-09-12 | End: 2021-09-13 | Stop reason: HOSPADM

## 2021-09-12 RX ADMIN — BACITRACIN ZINC: 500 OINTMENT TOPICAL at 10:18

## 2021-09-12 RX ADMIN — MAGNESIUM HYDROXIDE 30 ML: 400 SUSPENSION ORAL at 08:46

## 2021-09-12 RX ADMIN — GABAPENTIN 100 MG: 100 CAPSULE ORAL at 19:20

## 2021-09-12 RX ADMIN — BACITRACIN ZINC: 500 OINTMENT TOPICAL at 19:20

## 2021-09-12 RX ADMIN — ACETAMINOPHEN 975 MG: 325 TABLET, FILM COATED ORAL at 03:29

## 2021-09-12 RX ADMIN — GABAPENTIN 100 MG: 100 CAPSULE ORAL at 08:47

## 2021-09-12 RX ADMIN — Medication 5 ML: at 09:41

## 2021-09-12 RX ADMIN — OXYCODONE HYDROCHLORIDE 10 MG: 5 TABLET ORAL at 14:32

## 2021-09-12 RX ADMIN — POLYETHYLENE GLYCOL 3350 17 G: 17 POWDER, FOR SOLUTION ORAL at 08:46

## 2021-09-12 RX ADMIN — OXYCODONE HYDROCHLORIDE 10 MG: 5 TABLET ORAL at 19:23

## 2021-09-12 RX ADMIN — Medication 5 ML: at 14:28

## 2021-09-12 RX ADMIN — ENOXAPARIN SODIUM 40 MG: 40 INJECTION SUBCUTANEOUS at 08:46

## 2021-09-12 RX ADMIN — METHOCARBAMOL 750 MG: 750 TABLET ORAL at 03:29

## 2021-09-12 RX ADMIN — GABAPENTIN 100 MG: 100 CAPSULE ORAL at 14:28

## 2021-09-12 ASSESSMENT — ACTIVITIES OF DAILY LIVING (ADL)
ADLS_ACUITY_SCORE: 16

## 2021-09-12 NOTE — PLAN OF CARE
"/87 (BP Location: Right arm)   Pulse 110   Temp 98.5  F (36.9  C) (Temporal)   Resp 16   Ht 1.702 m (5' 7\")   Wt 60.8 kg (134 lb)   SpO2 100%   BMI 20.99 kg/m      Neuro: A&Ox4.   GI/: Denies nausea; pt reports passing gas; No BM this shift.  Diet/appetite: Tolerating regular diet. Pt had large projectile emesis.  Activity: Up independently and ambulating hallways.   Pain is managed with IV Dilaudid, Oxycodone, gabapentin, and scheduled tylenol.   Skin/drains: 2 Lap sites and transverse abdominal incision approximated with liquid bandage; FAVIOLA. middle site is covered with primopore.  Lines: 2 PIVs saline locked; port heparin locked. Awaiting ROBF. Possibly may discharge tomorrow to home. Continue POC.     "

## 2021-09-12 NOTE — PLAN OF CARE
Patient is A&O x 4. Tachy, otherwise VS WDL. Pain managed with scheduled Acetaminophen, prn Robaxin x 1. Patient requests food prior to taking meds, otherwise he becomes nauseous. Regular diet. No BM overnight, +BS, passing flatus. Up adlib to BR.  Transverse abd incision and lap sites x 2 intact and WDL, central portion of incision is covered with primapore. R FA PIV SL. Right chest port heparin locked. Possible discharge today.

## 2021-09-12 NOTE — PLAN OF CARE
"/69 (BP Location: Left arm)   Pulse 113   Temp 97.8  F (36.6  C) (Temporal)   Resp 16   Ht 1.702 m (5' 7\")   Wt 60.8 kg (134 lb)   SpO2 100%   BMI 20.99 kg/m      Neuro: A&Ox4.   GI/: Denies nausea; pt reports passing gas; No BM this shift.  Diet/appetite: Tolerating regular diet.  Activity: Up independently and ambulating hallways.   Pain is managed with Oxycodone, gabapentin, and scheduled tylenol.   Skin/drains: 3 Lap sites and transverse abdominal incision approximated with liquid bandage; FAVIOLA. middle site is covered with primopore.  Lines: Left PIVs saline locked; port heparin locked. Awaiting ROBF. Possibly may discharge tomorrow to home. Continue POC.  "

## 2021-09-12 NOTE — PROVIDER NOTIFICATION
Paged team about pt is requesting PO Dilaudid instead of Oxycodone.  Pt does not like the oxycodone. Can you switch IV dilaudid to PO Dilaudid.     Provider called back and recommended to continue with current pain regimen and if there will be any changes it will be addressed tomorrow morning.

## 2021-09-12 NOTE — PROGRESS NOTES
Surgical Oncology Progress Note    Interval History:  Emesis last night after tylenol, patient thinks he may have an allergy to tylenol and does not take at baseline. Tolerated some PO yesterday, feeling well otherwise.     Physical Exam:   Temp:  [97.5  F (36.4  C)-98.7  F (37.1  C)] 98.1  F (36.7  C)  Pulse:  [101-117] 101  Resp:  [12-16] 16  BP: (110-136)/(61-87) 110/61  SpO2:  [98 %-100 %] 98 %     General: Alert, oriented, appears comfortable, NAD.  Respiratory: breathing non labored  Abdomen: Abdomen is soft, non-tender, non-distended. Incision is clean, dry and intact. Small, subcentimeter superficial partial thickness burn to midline without erythema    Data:   All laboratory and imaging data in the past 24 hours reviewed  I/O last 3 completed shifts:  In: 774 [P.O.:760; I.V.:14]  Out: -   Recent Labs   Lab Test 09/11/21  0847 09/10/21  0735 09/09/21  1236   WBC 6.3 8.5 9.8   HGB 7.9* 8.3* 9.4*   * 130* 157   INR  --   --  1.15      Recent Labs   Lab Test 09/11/21  0847 09/10/21  0735 09/10/21  0650 09/09/21  1236    139  --  138   POTASSIUM 3.8 4.0  --  4.6   CHLORIDE 103 105  --  111*   CO2 30 29  --  23   BUN 19 18  --  16   CR 1.70* 1.65*  --  1.68*   ANIONGAP 6 5  --  4   GAYE 9.0 8.9  --  8.7   * 93 96 104*        Recent Labs   Lab Test 09/10/21  0735   PROTTOTAL 6.6*   ALBUMIN 3.2*   BILITOTAL 0.6   ALKPHOS 88   *   *       Assessment and Plan:     Cyrus Huertas is a 26 year old male with a 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 He was found to have a liver mass. Now s/p partial hepatectomy with Dr. Fischer 9/9     - Regular   - MoM to stimulate BM   - discontinue tylenol, prn pain control with oxycodone  - Lovenox for DVT prophylaxis, teaching needed.  - Aquaphor and non-stick dressing to burn on midline  - Possible discharge pending bowel movement and  tolerating diet.    Seen withwith Staff.     Ramila Kurtz MD   General Surgery PGY3  [phone]

## 2021-09-12 NOTE — PROVIDER NOTIFICATION
Notified team about pt had large brown projectile emesis. Pt denies nausea, VSS except tachycardic- will continue monitoring.     Provider called back and recommended to monitor.

## 2021-09-12 NOTE — PROGRESS NOTES
Paged about patient having large emesis. Went and assessed patient.     He had just returned from a walk and was not feeling nauseous. Described his emesis as mostly water and that it came about suddenly. Diet was advanced today however reports his intake today was largely only liquids. BP 130s/80s, HR 110s.    Advised patient to back off on his fluid intake if he becomes nauseous. PRN zofran available, patient does not want at this time. Will continue to monitor.     - - - - - - - - - - - - - - - - - -  Tristen Morrison MD  [facility] General Surgery PGY-1  09/11/2021    See [facility] for on-call pager information.

## 2021-09-12 NOTE — PLAN OF CARE
"/65 (BP Location: Left arm)   Pulse 111   Temp 99.9  F (37.7  C) (Temporal)   Resp 16   Ht 1.702 m (5' 7\")   Wt 60.8 kg (134 lb)   SpO2 100%   BMI 20.99 kg/m      Neuro: A&Ox4.   GI/: Denies nausea; pt reports passing gas; No BM yet.  Diet/appetite: Tolerating regular diet.  Activity: Up independently and ambulating hallways.   Pain is managed with Oxycodone, IV Dilaudid, and gabapentin. Pt reported that he is allergic to Tylenol.   Skin/drains: 3 Lap sites and transverse abdominal incision approximated with liquid bandage; FAVIOLA. middle site is covered with primopore; dressing changed and Bacitracin ointment was applied.  Lines: Port heparin locked. Awaiting ROBF. Possibly may discharge tomorrow to home. Continue with POC.  "

## 2021-09-13 VITALS
WEIGHT: 134 LBS | TEMPERATURE: 98.3 F | RESPIRATION RATE: 16 BRPM | HEART RATE: 95 BPM | DIASTOLIC BLOOD PRESSURE: 66 MMHG | SYSTOLIC BLOOD PRESSURE: 113 MMHG | HEIGHT: 67 IN | BODY MASS INDEX: 21.03 KG/M2 | OXYGEN SATURATION: 96 %

## 2021-09-13 PROBLEM — K56.0 PARALYTIC ILEUS (H): Status: ACTIVE | Noted: 2021-09-13

## 2021-09-13 LAB
ALBUMIN SERPL-MCNC: 3 G/DL (ref 3.4–5)
ALP SERPL-CCNC: 115 U/L (ref 40–150)
ALT SERPL W P-5'-P-CCNC: 78 U/L (ref 0–70)
ANION GAP SERPL CALCULATED.3IONS-SCNC: 6 MMOL/L (ref 3–14)
AST SERPL W P-5'-P-CCNC: 59 U/L (ref 0–45)
BASOPHILS # BLD AUTO: 0 10E3/UL (ref 0–0.2)
BASOPHILS NFR BLD AUTO: 0 %
BILIRUB SERPL-MCNC: 0.5 MG/DL (ref 0.2–1.3)
BUN SERPL-MCNC: 18 MG/DL (ref 7–30)
CALCIUM SERPL-MCNC: 9.3 MG/DL (ref 8.5–10.1)
CHLORIDE BLD-SCNC: 102 MMOL/L (ref 94–109)
CO2 SERPL-SCNC: 28 MMOL/L (ref 20–32)
CREAT SERPL-MCNC: 1.66 MG/DL (ref 0.66–1.25)
EOSINOPHIL # BLD AUTO: 0.1 10E3/UL (ref 0–0.7)
EOSINOPHIL NFR BLD AUTO: 2 %
ERYTHROCYTE [DISTWIDTH] IN BLOOD BY AUTOMATED COUNT: 13.6 % (ref 10–15)
GFR SERPL CREATININE-BSD FRML MDRD: 55 ML/MIN/1.73M2
GLUCOSE BLD-MCNC: 78 MG/DL (ref 70–99)
HCT VFR BLD AUTO: 22.3 % (ref 40–53)
HGB BLD-MCNC: 7.4 G/DL (ref 13.3–17.7)
IMM GRANULOCYTES # BLD: 0 10E3/UL
IMM GRANULOCYTES NFR BLD: 0 %
LYMPHOCYTES # BLD AUTO: 1 10E3/UL (ref 0.8–5.3)
LYMPHOCYTES NFR BLD AUTO: 18 %
MCH RBC QN AUTO: 33 PG (ref 26.5–33)
MCHC RBC AUTO-ENTMCNC: 33.2 G/DL (ref 31.5–36.5)
MCV RBC AUTO: 100 FL (ref 78–100)
MONOCYTES # BLD AUTO: 0.6 10E3/UL (ref 0–1.3)
MONOCYTES NFR BLD AUTO: 10 %
NEUTROPHILS # BLD AUTO: 4 10E3/UL (ref 1.6–8.3)
NEUTROPHILS NFR BLD AUTO: 70 %
NRBC # BLD AUTO: 0 10E3/UL
NRBC BLD AUTO-RTO: 0 /100
PHOSPHATE SERPL-MCNC: 2.8 MG/DL (ref 2.5–4.5)
PLATELET # BLD AUTO: 113 10E3/UL (ref 150–450)
POTASSIUM BLD-SCNC: 3.9 MMOL/L (ref 3.4–5.3)
PROT SERPL-MCNC: 6.8 G/DL (ref 6.8–8.8)
RBC # BLD AUTO: 2.24 10E6/UL (ref 4.4–5.9)
SODIUM SERPL-SCNC: 136 MMOL/L (ref 133–144)
WBC # BLD AUTO: 5.7 10E3/UL (ref 4–11)

## 2021-09-13 PROCEDURE — 250N000013 HC RX MED GY IP 250 OP 250 PS 637: Performed by: STUDENT IN AN ORGANIZED HEALTH CARE EDUCATION/TRAINING PROGRAM

## 2021-09-13 PROCEDURE — 0FN24ZZ RELEASE LEFT LOBE LIVER, PERCUTANEOUS ENDOSCOPIC APPROACH: ICD-10-PCS | Performed by: SURGERY

## 2021-09-13 PROCEDURE — 0DNW4ZZ RELEASE PERITONEUM, PERCUTANEOUS ENDOSCOPIC APPROACH: ICD-10-PCS | Performed by: SURGERY

## 2021-09-13 PROCEDURE — 80053 COMPREHEN METABOLIC PANEL: CPT | Performed by: STUDENT IN AN ORGANIZED HEALTH CARE EDUCATION/TRAINING PROGRAM

## 2021-09-13 PROCEDURE — 85025 COMPLETE CBC W/AUTO DIFF WBC: CPT | Performed by: STUDENT IN AN ORGANIZED HEALTH CARE EDUCATION/TRAINING PROGRAM

## 2021-09-13 PROCEDURE — 250N000011 HC RX IP 250 OP 636: Performed by: STUDENT IN AN ORGANIZED HEALTH CARE EDUCATION/TRAINING PROGRAM

## 2021-09-13 PROCEDURE — 0F500ZZ DESTRUCTION OF LIVER, OPEN APPROACH: ICD-10-PCS | Performed by: SURGERY

## 2021-09-13 PROCEDURE — 84100 ASSAY OF PHOSPHORUS: CPT | Performed by: STUDENT IN AN ORGANIZED HEALTH CARE EDUCATION/TRAINING PROGRAM

## 2021-09-13 PROCEDURE — 36591 DRAW BLOOD OFF VENOUS DEVICE: CPT | Performed by: STUDENT IN AN ORGANIZED HEALTH CARE EDUCATION/TRAINING PROGRAM

## 2021-09-13 PROCEDURE — 99231 SBSQ HOSP IP/OBS SF/LOW 25: CPT | Performed by: PHYSICIAN ASSISTANT

## 2021-09-13 PROCEDURE — 0FB00ZZ EXCISION OF LIVER, OPEN APPROACH: ICD-10-PCS | Performed by: SURGERY

## 2021-09-13 RX ORDER — HYDROMORPHONE HYDROCHLORIDE 2 MG/1
2 TABLET ORAL EVERY 4 HOURS PRN
Qty: 30 TABLET | Refills: 0 | Status: SHIPPED | OUTPATIENT
Start: 2021-09-13

## 2021-09-13 RX ADMIN — HYDROMORPHONE HYDROCHLORIDE 2 MG: 2 TABLET ORAL at 02:43

## 2021-09-13 RX ADMIN — ENOXAPARIN SODIUM 40 MG: 40 INJECTION SUBCUTANEOUS at 09:32

## 2021-09-13 RX ADMIN — Medication 5 ML: at 11:22

## 2021-09-13 RX ADMIN — HYDROMORPHONE HYDROCHLORIDE 2 MG: 2 TABLET ORAL at 11:35

## 2021-09-13 RX ADMIN — MAGNESIUM HYDROXIDE 30 ML: 400 SUSPENSION ORAL at 09:32

## 2021-09-13 RX ADMIN — GABAPENTIN 100 MG: 100 CAPSULE ORAL at 09:32

## 2021-09-13 RX ADMIN — BACITRACIN ZINC: 500 OINTMENT TOPICAL at 09:33

## 2021-09-13 RX ADMIN — POLYETHYLENE GLYCOL 3350 17 G: 17 POWDER, FOR SOLUTION ORAL at 09:32

## 2021-09-13 RX ADMIN — METHOCARBAMOL 750 MG: 750 TABLET ORAL at 03:50

## 2021-09-13 RX ADMIN — Medication 5 ML: at 09:07

## 2021-09-13 ASSESSMENT — ACTIVITIES OF DAILY LIVING (ADL)
ADLS_ACUITY_SCORE: 14
ADLS_ACUITY_SCORE: 14
ADLS_ACUITY_SCORE: 16
ADLS_ACUITY_SCORE: 14

## 2021-09-13 NOTE — PLAN OF CARE
Assumed care of Patient from [id number]. AVSS on RA. Lovenox teaching completed with patient at bedside. Patient reports good pain control with Dilaudid PO. Tolerating regular diet. Nausea denied throughout shift. Patient reports passing flatus and having a last BM prior to admission. Transverse abdominal incision/dressing c/d/I and changed with Bacitracin. Pt voiding spontaneously with adequate output. Up and jacy. Discharge orders reviewed with patient, all questions answered and patient discharged home with paper work.

## 2021-09-13 NOTE — PROGRESS NOTES
Surgical Oncology Progress Note    Interval History:  No acute events overnight. Feeling well today. Pain controlled. Denies nausea and tolerating regular diet. Passing flatus but no bowel movement yet.     Physical Exam:   Temp:  [97.8  F (36.6  C)-99.9  F (37.7  C)] 98.3  F (36.8  C)  Pulse:  [] 95  Resp:  [16] 16  BP: (110-122)/(65-69) 113/66  SpO2:  [96 %-100 %] 96 %  General: Alert, oriented, appears comfortable, NAD.  Respiratory: breathing non labored  Abdomen: Abdomen is soft, non-tender, non-distended. Incision is clean, dry and intact. Superficial skin wound left of the incision is stable without erythema.     Data:   All laboratory and imaging data in the past 24 hours reviewed  I/O last 3 completed shifts:  In: 1320 [P.O.:1320]  Out: -   Recent Labs   Lab Test 09/12/21  0946 09/11/21  0847 09/10/21  0735 09/09/21  1236   WBC 5.5 6.3 8.5 9.8   HGB 7.4* 7.9* 8.3* 9.4*   * 117* 130* 157   INR  --   --   --  1.15      Recent Labs   Lab Test 09/12/21  0946 09/11/21  0847 09/10/21  0735    139 139   POTASSIUM 3.7 3.8 4.0   CHLORIDE 103 103 105   CO2 30 30 29   BUN 19 19 18   CR 1.67* 1.70* 1.65*   ANIONGAP 5 6 5   GAYE 9.3 9.0 8.9   GLC 83 100* 93      Recent Labs   Lab Test 09/12/21  0946   PROTTOTAL 6.4*   ALBUMIN 2.9*   BILITOTAL 0.6   ALKPHOS 100   AST 83*   ALT 97*     Assessment and Plan:     Cyrus Huertas is a 26 year old male with a 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 He was found to have a liver mass. Now POD 4 s/p partial hepatectomy with Dr. Fischer 9/9.      - Available for pain is dilaudid PO prn and dilaudid IV prn, gabapentin  - Regular diet, miralax   - Aquaphor and non-stick dressing abdominal skin wound  - Lovenox for DVT prophylaxis, teaching needed.  - Possible discharge to home today.     Seen with Chief resident who will discuss with Staff.     Julienne PAEZ  OLIVA Mendoza   Surgical Oncology

## 2021-09-13 NOTE — DISCHARGE SUMMARY
[facility] Discharge Summary    Cyrus Huertas Jr. MRN# [id number]   Age: 26 year old YOB: 1995     Date of Admission:  9/9/2021  Date of Discharge::  9/13/2021  Admitting Physician:  Arvin Fischer MD  Discharge Physician:  Arvin Fischer MD     PCP:  Vj Corey    Disposition: Patient discharged to home in stable condition.    Admission Diagnosis:  Liver mass  Germ cell tumor of the testicle  Retroperitoneal mass    Discharge Diagnosis:  Liver mass  Ileus  Germ cell tumor of the testicle  Retroperitoneal mass    Discharge medications  Current Discharge Medication List      START taking these medications    Details   enoxaparin ANTICOAGULANT (LOVENOX) 40 MG/0.4ML syringe Inject 0.4 mLs (40 mg) Subcutaneous every 24 hours for 24 days  Qty: 9.6 mL, Refills: 0    Associated Diagnoses: Germ cell tumor (H)      HYDROmorphone (DILAUDID) 2 MG tablet Take 1 tablet (2 mg) by mouth every 4 hours as needed for moderate to severe pain  Qty: 30 tablet, Refills: 0    Associated Diagnoses: Postoperative pain      methocarbamol (ROBAXIN) 500 MG tablet Take 1 tablet (500 mg) by mouth every 6 hours as needed for muscle spasms  Qty: 28 tablet, Refills: 0    Associated Diagnoses: Germ cell tumor (H)      mineral oil-hydrophilic petrolatum (AQUAPHOR) external ointment Apply topically 2 times daily Apply to burn on abdomen 2 times daily and cover with a non-adherent dressing until fully healed.  Qty: 198 g, Refills: 0    Associated Diagnoses: Germ cell tumor (H)      polyethylene glycol (MIRALAX) 17 GM/Dose powder Take 17 g by mouth daily for 14 days  Qty: 510 g, Refills: 0    Associated Diagnoses: Germ cell tumor (H)         CONTINUE these medications which have NOT CHANGED    Details   bisacodyl (DULCOLAX) 5 MG EC tablet daily as needed       Misc Natural Products (AIRBORNE ELDERBERRY) CHEW Take 1 chew tab by mouth daily      Multiple Vitamins-Minerals (MENS MULTI VITAMIN &  "MINERAL PO) Take 1 tablet by mouth daily           Follow up, Special Instructions:  After Care     Future Labs/Procedures    Activity     Comments:    Your activity upon discharge: activity as tolerated    Diet     Comments:    Follow this diet upon discharge: Regular    Discharge Instructions     Comments:    If your travel plans upon discharge include prolonged periods of sitting (a lengthy car or plane ride), it is highly beneficial to get up and walk at least once per hour to help prevent swelling and blood clots.     You may shower after operation, let warm soapy water run over incision and pat dry. Do not submerge, soak, or scrub incision.    Take incentive spirometer home for continued frequent use    You will be discharged with narcotic pain medications. Please take them only as needed and do not operate a car or heavy machinery for 24 hours after taking them.  Narcotic pain medications like oxycodone are constipating. Please ensure that you are drinking adequate amounts of fluids and taking stool softeners while you are taking narcotics. Take Miralax as needed for constipation.     Do not drive until you can with stand pressing the brake pedal quickly and fully without pain and you are not distracted by pain.     Call for fever greater than 101.5, chills, red skin around incision, drainage from incision, increased swelling from the incision, bleeding from the incision that is not controlled with light pressure, inability to tolerate diet,new nausea/vomiting or other new/worsening symptoms.   You may also call the surgical oncology nurse care coordinator from 8:00am-4:30pm NICK Silver at [phone]. For after hours questions or concerns you can contact the on-call surgical oncology resident (nights and weekends [phone] and ask \"I would like to page the Surgical Oncology Resident on call.\"). In emergencies, call [phone]    Wound Care:  Apply Aquaphor 2 times daily and as needed to burn on abdomen. " Cover with a non-adherent dressing until fully healed.     Follow Up:  Follow up in clinic with Dr. Fischer 9/20. You should be called to make an appointment within 3 business days. If you are not contacted, call [phone] to make an appointment.        Procedures:  9/9 Dr. Fischer  Laparoscopic hand assisted partial hepatectomy  laparoscopic microwave ablation of liver tumor, intraoperative ultrasound  Laparoscopic lysis adhesions    Consultations:  None    Brief HPI:  Cyrus Huertas is a 26 year old male with a 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 He was found to have a liver mass.     Hospital Course:  The patient was admitted and underwent the above procedure. The patient tolerated the procedure well. The patient recovered well with no post-operative complications. He has post operative nausea and vomiting. His diet was advanced as bowel function returned and as tolerated. Prior to discharge pain was controlled with oral pain medication and the patient was able to ambulate and void without difficulty. The patient received appropriate education post operatively [facility] teaching. On POD #4 the patient was discharged to home.    Surgical pathology  Pending     Julienne Mendoza PA-C

## 2021-09-13 NOTE — PLAN OF CARE
Patient is A&O x 4. Tachy, otherwise VS WDL. Pain managed with prn po Dilaudid x 1 and prn Robaxin x 1. States he is unable to take Acetaminophen due to it making him feel nauseous and have emesis. Patient requests food prior to taking meds, otherwise he becomes nauseous. Regular diet. No BM overnight, +BS, passing flatus. Up adlib to BR.  Transverse abd incision and lap sites x 2 intact and WDL, central portion of incision is covered with primapore. Right chest port heparin locked. Possible discharge today.

## 2021-09-14 ENCOUNTER — PATIENT OUTREACH (OUTPATIENT)
Dept: CARE COORDINATION | Facility: CLINIC | Age: 26
End: 2021-09-14

## 2021-09-14 DIAGNOSIS — Z71.89 OTHER SPECIFIED COUNSELING: ICD-10-CM

## 2021-09-14 LAB
PATH REPORT.COMMENTS IMP SPEC: NORMAL
PATH REPORT.FINAL DX SPEC: NORMAL
PATH REPORT.GROSS SPEC: NORMAL
PATH REPORT.MICROSCOPIC SPEC OTHER STN: NORMAL
PATH REPORT.MICROSCOPIC SPEC OTHER STN: NORMAL
PATH REPORT.RELEVANT HX SPEC: NORMAL
PHOTO IMAGE: NORMAL

## 2021-09-14 NOTE — PROGRESS NOTES
Clinic Care Coordination Contact  [facility]: Post-Discharge Note  SITUATION                                                      Admission:    Admission Date: 09/09/21   Reason for Admission: Liver resection  Discharge:   Discharge Date: 09/13/21  Discharge Diagnosis: Liver resection    BACKGROUND                                                      Cyrus Huertas is a 26 year old male with a 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 He was found to have a liver mass.        ASSESSMENT      Enrollment  Primary Care Care Coordination Status: Not a Candidate    Discharge Assessment  How are you doing now that you are home?: doing okay, little sore  How are your symptoms? (Red Flag symptoms escalate to triage hotline per guidelines): Improved  Do you feel your condition is stable enough to be safe at home until your provider visit?: Yes  Does the patient have their discharge instructions? : Yes  Does the patient have questions regarding their discharge instructions? : No  Were you started on any new medications or were there changes to any of your previous medications? : Yes  Does the patient have all of their medications?: Yes  Do you have questions regarding any of your medications? : No  Do you have all of your needed medical supplies or equipment (DME)?  (i.e. oxygen tank, CPAP, cane, etc.): Yes  Discharge follow-up appointment scheduled within 14 calendar days? : Yes  Discharge Follow Up Appointment Date: 09/20/21  Discharge Follow Up Appointment Scheduled with?: Specialty Care Provider    Post-op ([facility] Only)  If the patient had a surgery or procedure, do they have any questions for a nurse?: No             PLAN                                                      Outpatient Plan: Follow up with Dr. Fischer 9/20.      Future Appointments   Date Time Provider Department [place]   9/20/2021  1:30 PM  Arvin Fischer MD [facility]         For any urgent concerns, please contact our 24 hour nurse triage line: [phone])         RAMA Lundberg  [phone]  [facility]

## 2021-09-15 NOTE — OP NOTE
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    PROCEDURES PERFORMED:     1.  Exploratory laparoscopy.  2.  Extensive lysis of adhesions lasting over an hour.  3.  Intraoperative hepatic ultrasound.  4.  Hand-assisted laparoscopic partial left hepatectomy.    ANESTHESIA:  General.    ESTIMATED BLOOD LOSS:  20 mL.    SPECIMENS:  Partial hepatectomy including segments 2 and 3.    DESCRIPTION OF PROCEDURE:  Mr. Huertas was put to sleep and positioned by Anesthesia.  He was prepped and draped sterilely and a pause for the cause was performed and was accurate.  Because of the patient's extensive previous surgical history, we entered the abdomen using a small open incision in the periumbilical position.  We entered the abdomen without significant difficulty.  We were able to get a trocar in that position.  Beyond that, however, we were met with extensive adhesions.  We did find a free spot in the left lateral abdomen, which allowed us to place an additional 5 mm trocar so that we could start taking adhesions down using the Harmonic scalpel.  As we progressed, we were able to place additional trocars.  Ultimately, we spent well over 1 hour taking down adhesions.  In particular, the left lobe of the liver was densely adherent to the diaphragm and the stomach was densely adherent to the undersurface of the liver as well.  These were all taken down using a combination of Bovie electrocautery or Harmonic scalpel.  Ultimately, we were able to free up the lateral aspect of the liver and performed an intraoperative ultrasound.  We were able to identify the patient's previously seen tumor, which seemed slightly larger than seen and was more diffuse with indistinct margins based on ultrasound.  It extended to the medial aspect of  segment 2 of the liver fairly close to the confluence of the left hepatic vein and the vena cava.  We did take down the falciform ligament to the level of the vena cava to make sure the left side of the liver was fully mobilized.  At this point, in an effort to complete a safe resection, particularly because of the proximity to the vena cava, I decided to place a hand port.  With that in mind, we made a small right subcostal incision to allow for the hand-port placement.  We then used intraoperative ultrasound once again to plan our transection line, which essentially went along the falciform ligament.  We then used microwave ablation to perform pretransection coagulation along our planned transection line.  We then divided the superficial liver parenchyma using the Harmonic scalpel.  The vascular pedicles were divided using a white load stapling device.  The left lateral segment was then removed from the abdomen through our hand-port site.  Pathologic evaluation revealed that the tumor was at least a centimeter away from the margin, and therefore no frozen sections were performed.  We divided the left hepatic vein at its confluence with the vena cava.  We inspected for hemostasis.  Essentially, there was no bleeding or bile leakage seen at any time.  We did use the argon beam coagulator just to touch up some raw surface of the liver.  We also resuscitated the patient and performed a +30 Valsalva, which did not prompt any bleeding.  Satisfied with that, we desufflated the abdomen.  The 5 mm ports were removed.  The small right subcostal incision was closed in 2 layers using 0 looped PDS.  Subcutaneous tissues were irrigated and the skin incision was closed using 3-0 Vicryl, followed by 4-0 Monocryl, followed by Dermabond.  The additional port sites were closed using 4-0 Monocryl, followed by Dermabond.  The patient tolerated the procedure well, was extubated in the operating room, transferred to recovery room in  stable condition.      I was present throughout the entire case.      I do believe this case would qualify for a complexity 22 modifier because of the extensive previous surgical history leading to dense intra-abdominal adhesions, which added well over an hour to the case.     Of note, at the completion of the case, a small skin burn measuring slightly less than 1 cm was identified adjacent to our microwave ablation insertion sites.  Upon awakening, I did discuss this with the patient and a bacitracin dressing was applied to that area without any further management necessary.    Arvin Fischer MD        D: 09/15/2021   T: 09/15/2021   MT: [id number]    Name:     LIZZIE DAVE  MRN:      [id number]        Account:        [id number]   :      1995           Procedure Date: 2021     Document: [id number]

## 2021-09-20 ENCOUNTER — OFFICE VISIT (OUTPATIENT)
Dept: SURGERY | Facility: CLINIC | Age: 26
End: 2021-09-20
Attending: SURGERY
Payer: COMMERCIAL

## 2021-09-20 VITALS
RESPIRATION RATE: 16 BRPM | DIASTOLIC BLOOD PRESSURE: 68 MMHG | HEART RATE: 92 BPM | BODY MASS INDEX: 19.45 KG/M2 | WEIGHT: 123.9 LBS | TEMPERATURE: 97.8 F | OXYGEN SATURATION: 100 % | HEIGHT: 67 IN | SYSTOLIC BLOOD PRESSURE: 112 MMHG

## 2021-09-20 DIAGNOSIS — C80.1 GERM CELL TUMOR (H): Primary | ICD-10-CM

## 2021-09-20 PROCEDURE — 99024 POSTOP FOLLOW-UP VISIT: CPT | Performed by: SURGERY

## 2021-09-20 PROCEDURE — G0463 HOSPITAL OUTPT CLINIC VISIT: HCPCS

## 2021-09-20 ASSESSMENT — PAIN SCALES - GENERAL: PAINLEVEL: NO PAIN (0)

## 2021-09-20 ASSESSMENT — MIFFLIN-ST. JEOR: SCORE: 1490.64

## 2021-09-20 NOTE — LETTER
9/20/2021         RE: Cyrus Huertas .  [address] [facility] [address]        Dear Colleague,    Thank you for referring your patient, Cyrus Huertas Jr., to the [facility]. Please see a copy of my visit note below.    Looks great  No issues  Path reviewed  Margins negative  Incisions look great    Follow-up with me PRN      Again, thank you for allowing me to participate in the care of your patient.        Sincerely,        Arvin Fischer MD

## 2021-09-20 NOTE — NURSING NOTE
"Oncology Rooming Note    September 20, 2021 1:24 PM   Cyrus Huertas Jr. is a 26 year old male who presents for:    Chief Complaint   Patient presents with     Oncology Clinic Visit     Germ cell tumor fu DOS 9/9/21     Initial Vitals: /68   Pulse 92   Temp 97.8  F (36.6  C)   Resp 16   Ht 1.702 m (5' 7\")   Wt 56.2 kg (123 lb 14.4 oz)   SpO2 100%   BMI 19.41 kg/m   Estimated body mass index is 19.41 kg/m  as calculated from the following:    Height as of this encounter: 1.702 m (5' 7\").    Weight as of this encounter: 56.2 kg (123 lb 14.4 oz). Body surface area is 1.63 meters squared.  No Pain (0) Comment: Data Unavailable   No LMP for male patient.  Allergies reviewed: Yes  Medications reviewed: Yes    Medications: Medication refills not needed today.  Pharmacy name entered into EPIC:    [facility] - [place], [place] - [address] [id number] - [place], [place] - [address] [facility] [address] [place] [address] [facility] STORE #[id number] - [place] - [address] [place] [address] & [facility] - [place], [place] - [address] [place] [address] [facility]ACY [facility] - [place], [place] - [address] [facility]    Clinical concerns: No new concerns        Stevie Rubio MA            "

## 2021-09-22 NOTE — PROGRESS NOTES
Looks great  No issues  Path reviewed  Margins negative  Incisions look great    Follow-up with me PRN

## 2021-09-25 ENCOUNTER — HEALTH MAINTENANCE LETTER (OUTPATIENT)
Age: 26
End: 2021-09-25

## 2022-02-09 DIAGNOSIS — Z00.6 EXAMINATION OF PARTICIPANT OR CONTROL IN CLINICAL RESEARCH: Primary | ICD-10-CM

## 2022-05-01 ENCOUNTER — HEALTH MAINTENANCE LETTER (OUTPATIENT)
Age: 27
End: 2022-05-01

## 2022-12-26 ENCOUNTER — HEALTH MAINTENANCE LETTER (OUTPATIENT)
Age: 27
End: 2022-12-26

## 2023-06-02 ENCOUNTER — HEALTH MAINTENANCE LETTER (OUTPATIENT)
Age: 28
End: 2023-06-02

## 2023-06-05 DIAGNOSIS — Z00.6 EXAMINATION OF PARTICIPANT OR CONTROL IN CLINICAL RESEARCH: Primary | ICD-10-CM

## 2023-11-26 ENCOUNTER — HEALTH MAINTENANCE LETTER (OUTPATIENT)
Age: 28
End: 2023-11-26

## 2024-03-22 NOTE — PLAN OF CARE
"POD 0  VSS on RA. /68 (BP Location: Right arm)   Pulse 67   Temp 98.6  F (37  C) (Temporal)   Resp 11   Ht 1.702 m (5' 7\")   Wt 61.9 kg (136 lb 7.4 oz)   SpO2 96%   BMI 21.37 kg/m      Neuro: A&Ox4  GI/: Denies nausea; pt reports passing gas; no BM this shift.  Diet/appetite: NPO except Ice chips/meds.  Activity: Has not ambulated yet.   Pain: managed with PCA, and tylenol  Skin/drains: 2 Lap sites, Transverse abdominal incision closed with liquid bandage; FAVIOLA.   Lines: 2 PIVs infusing IVF/PCA. Continue POC.    " Statement Selected

## (undated) DEVICE — SU VICRYL 0 CT-1 36" J346H

## (undated) DEVICE — LINEN TOWEL PACK X6 WHITE 5487

## (undated) DEVICE — JELLY LUBRICATING SURGILUBE 2OZ TUBE

## (undated) DEVICE — SUTURE BOOTS 051003PBX

## (undated) DEVICE — SU SILK 0 TIE 6X30" A306H

## (undated) DEVICE — ENDO TROCAR FIRST ENTRY KII FIOS Z-THRD 12X100MM CTF73

## (undated) DEVICE — Device

## (undated) DEVICE — SUCTION IRR STRYKERFLOW II W/TIP 250-070-520

## (undated) DEVICE — SU SILK 3-0 TIE 12X30" A304H

## (undated) DEVICE — CLIP APPLIER ENDO 5MM M/L LIGAMAX EL5ML

## (undated) DEVICE — SURGICEL ABSORBABLE HEMOSTAT SNOW 4"X4" 2083

## (undated) DEVICE — ESU ENDO SILS HOOK 5MM ARTICULATING MONO SILSHOOK36

## (undated) DEVICE — SU ETHILON 3-0 PS-1 18" 1663H

## (undated) DEVICE — NDL COUNTER 20CT 31142493

## (undated) DEVICE — SU PDS II 4-0 SH 27" Z315H

## (undated) DEVICE — LINEN TOWEL PACK X5 5464

## (undated) DEVICE — PREP CHLORAPREP 26ML TINTED ORANGE  260815

## (undated) DEVICE — LINEN TOWEL PACK X30 5481

## (undated) DEVICE — SU PDS II 0 TP-1 60" Z991G

## (undated) DEVICE — SUCTION TIP YANKAUER STR K87

## (undated) DEVICE — PACK GOWN 3/PK DISP XL SBA32GPFCB

## (undated) DEVICE — ENDO SCOPE WARMER SEAL  C3101

## (undated) DEVICE — CLIP HORIZON LG ORANGE 004200

## (undated) DEVICE — SU SILK 2-0 SH 30" K833H

## (undated) DEVICE — ESU HARMONIC ACE LAPAROSCOPIC SHEARS 5MMX36CM CVD HAR36

## (undated) DEVICE — ADH SKIN CLOSURE PREMIERPRO EXOFIN 1.0ML 3470

## (undated) DEVICE — SU VICRYL 3-0 SH 27" J316H

## (undated) DEVICE — SU VICRYL 3-0 SH CR 8X18" J774

## (undated) DEVICE — STPL POWERED ECHELON 45MM PSEE45A

## (undated) DEVICE — SU SILK 3-0 SH 30" K832H

## (undated) DEVICE — HYDROGEN PEROXIDE 3% 16OZ D0012

## (undated) DEVICE — ESU GROUND PAD THERMOGUARD PLUS ABC PEDS 7-382

## (undated) DEVICE — WIPES FOLEY CARE SURESTEP PROVON DFC100

## (undated) DEVICE — SU PROLENE 6-0 RB-2DA 30" 8711H

## (undated) DEVICE — SU SILK 4-0 TIE 12X30" A303H

## (undated) DEVICE — WIPE PREMOIST CLEANSING WASHCLOTHS 7988

## (undated) DEVICE — BASIN SET SINGLE STERILE 13752-624

## (undated) DEVICE — DRSG ADAPTIC 3X16"  6114

## (undated) DEVICE — GLOVE PROTEXIS W/NEU-THERA 7.5  2D73TE75

## (undated) DEVICE — SUCTION MANIFOLD NEPTUNE 2 SYS 4 PORT 0702-020-000

## (undated) DEVICE — DRSG ABDOMINAL 07 1/2X8" 7197D

## (undated) DEVICE — KIT CONNECTOR FOR OLYMPUS ENDOSCOPES DEFENDO 100310

## (undated) DEVICE — SU PROLENE 4-0 RB-1DA 36" 8557H

## (undated) DEVICE — SU VICRYL 0 UR-6 27" J603H

## (undated) DEVICE — VESSEL LOOPS BLUE SUPERMAXI 011022PBX

## (undated) DEVICE — ENDO TROCAR BLUNT TIP KII BALLOON 12X100MM C0R47

## (undated) DEVICE — SUCTION TIP YANKAUER W/O VENT K86

## (undated) DEVICE — GLOVE PROTEXIS MICRO 7.5  2D73PM75

## (undated) DEVICE — BLADE CLIPPER SGL USE 9680

## (undated) DEVICE — SU VICRYL 3-0 SH 27" UND J416H

## (undated) DEVICE — SOL WATER IRRIG 1000ML BOTTLE 2F7114

## (undated) DEVICE — SURGICEL HEMOSTAT 4X8" 1952

## (undated) DEVICE — ESU GROUND PAD ADULT W/CORD E7507

## (undated) DEVICE — PROBE PRESSURE NEUWAVE CERTUS 140 15GAX20CM PR20XT

## (undated) DEVICE — GOWN XLG DISP 9545

## (undated) DEVICE — ENDO SYSTEM WATER BOTTLE & TUBING W/CO2 FILTER 00711549

## (undated) DEVICE — CLIP HORIZON MED BLUE 002200

## (undated) DEVICE — GOWN IMPERVIOUS BREATHABLE SMART LG 89015

## (undated) DEVICE — STPL LINEAR 90 X 3.5MM TA9035S

## (undated) DEVICE — DRSG PRIMAPORE 03 1/8X6" 66000318

## (undated) DEVICE — SU UMBILICAL TAPE .125X30" U11T

## (undated) DEVICE — ESU ENDO SCISSORS 5MM CVD 5DCS

## (undated) DEVICE — STPL RELOAD 100 X 3.8MM GIA10038L

## (undated) DEVICE — DEVICE SUTURE GRASPER TROCAR CLOSURE 14GA PMITCSG

## (undated) DEVICE — ENDO TROCAR FIRST ENTRY KII FIOS Z-THRD 05X100MM CTF03

## (undated) DEVICE — SU MONOCRYL 4-0 PS-2 27" UND Y426H

## (undated) DEVICE — SU SILK 2-0 TIE 12X30" A305H

## (undated) DEVICE — PREP SKIN SCRUB TRAY 4461A

## (undated) DEVICE — SYR BULB IRRIG 50ML LATEX FREE 0035280

## (undated) DEVICE — DEVICE SUTURE PASSER 14GA WECK EFX EFXSP2

## (undated) DEVICE — SU SILK 1 TIE 6X30" A307H

## (undated) DEVICE — TUBING SUCTION 10'X3/16" N510

## (undated) DEVICE — SU PROLENE 5-0 RB-2DA 30" 8710H

## (undated) DEVICE — SOL NACL 0.9% IRRIG 1000ML BOTTLE 2F7124

## (undated) DEVICE — KIT PATIENT POSITIONING PIGAZZI LATEX FREE 40580

## (undated) DEVICE — ENDO TROCAR SLEEVE KII Z-THREADED 05X100MM CTS02

## (undated) DEVICE — SPONGE LAP 18X18" X8435

## (undated) DEVICE — ENDO GELPORT 100/120MM C8XX2

## (undated) DEVICE — STPL 100 X 3.8MM GIA10038S

## (undated) DEVICE — PREP POVIDONE IODINE SOLUTION 10% 4OZ

## (undated) DEVICE — CATH TRAY FOLEY SURESTEP 16FR W/URNE MTR STLK LATEX A303316A

## (undated) DEVICE — ESU HARMONIC ACE LAPAROSCOPIC SHEARS 5MMX23CM HAR23

## (undated) DEVICE — ENDO CAP AND TUBING STERILE FOR ENDOGATOR  100130

## (undated) DEVICE — SU PROLENE 5-0 RB-1DA 36"  8556H

## (undated) DEVICE — SUCTION SLEEVE NEPTUNE 2 125MM 0703-005-125

## (undated) DEVICE — DRAPE IOBAN INCISE 23X17" 6650EZ

## (undated) DEVICE — GLOVE PROTEXIS BLUE W/NEU-THERA 8.0  2D73EB80

## (undated) DEVICE — DRAPE POUCH INSTRUMENT 1018

## (undated) DEVICE — VESSEL LOOPS YELLOW MAXI 31145694

## (undated) DEVICE — DRAPE U SPLIT 74X120" 29440

## (undated) DEVICE — DRSG MEDIPORE 3 1/2X13 3/4" 3573

## (undated) DEVICE — SPONGE KITTNER 30-101

## (undated) DEVICE — APPLICATORS COTTON TIP 6"X2 STERILE LF C15053-006

## (undated) DEVICE — SU SILK 3-0 SH CR 8X18" C013D

## (undated) DEVICE — KIT ENDO FIRST STEP DISINFECTANT 200ML W/POUCH EP-4

## (undated) DEVICE — DRAPE LEGGINGS CLEAR 8430

## (undated) DEVICE — NDL INSUFFLATION 13GA 150MM C2202

## (undated) DEVICE — ESU PROBE LAPAROSCOPIC ABC HANDSWITCHING 5X36MM 160636

## (undated) DEVICE — SYSTEM CLEARIFY VISUALIZATION 21-345

## (undated) DEVICE — TUBING INSUFFLATION W/FILTER CPC TO LUER 620-030-301

## (undated) DEVICE — ESU LIGASURE IMPACT OPEN SEALER/DVDR CVD LG JAW LF4418

## (undated) DEVICE — ANTIFOG SOLUTION W/FOAM PAD 31142527

## (undated) RX ORDER — CLINDAMYCIN PHOSPHATE 900 MG/50ML
INJECTION, SOLUTION INTRAVENOUS
Status: DISPENSED
Start: 2021-01-18

## (undated) RX ORDER — HYDROMORPHONE HYDROCHLORIDE 1 MG/ML
INJECTION, SOLUTION INTRAMUSCULAR; INTRAVENOUS; SUBCUTANEOUS
Status: DISPENSED
Start: 2021-01-18

## (undated) RX ORDER — ALBUMIN, HUMAN INJ 5% 5 %
SOLUTION INTRAVENOUS
Status: DISPENSED
Start: 2021-01-18

## (undated) RX ORDER — FENTANYL CITRATE 50 UG/ML
INJECTION, SOLUTION INTRAMUSCULAR; INTRAVENOUS
Status: DISPENSED
Start: 2021-01-18

## (undated) RX ORDER — DEXAMETHASONE SODIUM PHOSPHATE 4 MG/ML
INJECTION, SOLUTION INTRA-ARTICULAR; INTRALESIONAL; INTRAMUSCULAR; INTRAVENOUS; SOFT TISSUE
Status: DISPENSED
Start: 2021-09-09

## (undated) RX ORDER — GLYCOPYRROLATE 0.2 MG/ML
INJECTION, SOLUTION INTRAMUSCULAR; INTRAVENOUS
Status: DISPENSED
Start: 2021-09-09

## (undated) RX ORDER — SODIUM CHLORIDE, SODIUM LACTATE, POTASSIUM CHLORIDE, CALCIUM CHLORIDE 600; 310; 30; 20 MG/100ML; MG/100ML; MG/100ML; MG/100ML
INJECTION, SOLUTION INTRAVENOUS
Status: DISPENSED
Start: 2021-01-18

## (undated) RX ORDER — HYDROMORPHONE HYDROCHLORIDE 1 MG/ML
INJECTION, SOLUTION INTRAMUSCULAR; INTRAVENOUS; SUBCUTANEOUS
Status: DISPENSED
Start: 2021-09-09

## (undated) RX ORDER — FENTANYL CITRATE-0.9 % NACL/PF 10 MCG/ML
PLASTIC BAG, INJECTION (ML) INTRAVENOUS
Status: DISPENSED
Start: 2021-01-18

## (undated) RX ORDER — ESMOLOL HYDROCHLORIDE 10 MG/ML
INJECTION INTRAVENOUS
Status: DISPENSED
Start: 2021-01-18

## (undated) RX ORDER — CEFAZOLIN SODIUM 1 G/3ML
INJECTION, POWDER, FOR SOLUTION INTRAMUSCULAR; INTRAVENOUS
Status: DISPENSED
Start: 2021-09-09

## (undated) RX ORDER — FENTANYL CITRATE-0.9 % NACL/PF 10 MCG/ML
PLASTIC BAG, INJECTION (ML) INTRAVENOUS
Status: DISPENSED
Start: 2021-09-09

## (undated) RX ORDER — OXYCODONE HYDROCHLORIDE 5 MG/1
TABLET ORAL
Status: DISPENSED
Start: 2021-09-09

## (undated) RX ORDER — LIDOCAINE HYDROCHLORIDE 20 MG/ML
INJECTION, SOLUTION EPIDURAL; INFILTRATION; INTRACAUDAL; PERINEURAL
Status: DISPENSED
Start: 2021-01-18

## (undated) RX ORDER — PROPOFOL 10 MG/ML
INJECTION, EMULSION INTRAVENOUS
Status: DISPENSED
Start: 2021-09-09

## (undated) RX ORDER — HEPARIN SODIUM 5000 [USP'U]/.5ML
INJECTION, SOLUTION INTRAVENOUS; SUBCUTANEOUS
Status: DISPENSED
Start: 2021-01-18

## (undated) RX ORDER — CALCIUM CHLORIDE 100 MG/ML
INJECTION INTRAVENOUS; INTRAVENTRICULAR
Status: DISPENSED
Start: 2021-01-18

## (undated) RX ORDER — HEPARIN SODIUM 1000 [USP'U]/ML
INJECTION, SOLUTION INTRAVENOUS; SUBCUTANEOUS
Status: DISPENSED
Start: 2021-01-18

## (undated) RX ORDER — EPHEDRINE SULFATE 50 MG/ML
INJECTION, SOLUTION INTRAMUSCULAR; INTRAVENOUS; SUBCUTANEOUS
Status: DISPENSED
Start: 2021-09-09

## (undated) RX ORDER — ONDANSETRON 2 MG/ML
INJECTION INTRAMUSCULAR; INTRAVENOUS
Status: DISPENSED
Start: 2021-09-09

## (undated) RX ORDER — SODIUM CHLORIDE 9 MG/ML
INJECTION, SOLUTION INTRAVENOUS
Status: DISPENSED
Start: 2021-01-18

## (undated) RX ORDER — FENTANYL CITRATE 50 UG/ML
INJECTION, SOLUTION INTRAMUSCULAR; INTRAVENOUS
Status: DISPENSED
Start: 2021-09-09

## (undated) RX ORDER — ONDANSETRON 2 MG/ML
INJECTION INTRAMUSCULAR; INTRAVENOUS
Status: DISPENSED
Start: 2021-01-18

## (undated) RX ORDER — EPHEDRINE SULFATE 50 MG/ML
INJECTION, SOLUTION INTRAMUSCULAR; INTRAVENOUS; SUBCUTANEOUS
Status: DISPENSED
Start: 2021-01-18

## (undated) RX ORDER — PROPOFOL 10 MG/ML
INJECTION, EMULSION INTRAVENOUS
Status: DISPENSED
Start: 2021-01-18

## (undated) RX ORDER — LEVOFLOXACIN 5 MG/ML
INJECTION, SOLUTION INTRAVENOUS
Status: DISPENSED
Start: 2021-09-09

## (undated) RX ORDER — SODIUM CHLORIDE, SODIUM LACTATE, POTASSIUM CHLORIDE, CALCIUM CHLORIDE 600; 310; 30; 20 MG/100ML; MG/100ML; MG/100ML; MG/100ML
INJECTION, SOLUTION INTRAVENOUS
Status: DISPENSED
Start: 2021-09-09

## (undated) RX ORDER — LIDOCAINE HYDROCHLORIDE 20 MG/ML
INJECTION, SOLUTION EPIDURAL; INFILTRATION; INTRACAUDAL; PERINEURAL
Status: DISPENSED
Start: 2021-09-09